# Patient Record
Sex: FEMALE | Race: WHITE | Employment: OTHER | URBAN - METROPOLITAN AREA
[De-identification: names, ages, dates, MRNs, and addresses within clinical notes are randomized per-mention and may not be internally consistent; named-entity substitution may affect disease eponyms.]

---

## 2020-01-01 ENCOUNTER — APPOINTMENT (OUTPATIENT)
Dept: GENERAL RADIOLOGY | Age: 70
DRG: 291 | End: 2020-01-01
Attending: INTERNAL MEDICINE
Payer: MEDICARE

## 2020-01-01 ENCOUNTER — PATIENT OUTREACH (OUTPATIENT)
Dept: CASE MANAGEMENT | Age: 70
End: 2020-01-01

## 2020-01-01 ENCOUNTER — APPOINTMENT (OUTPATIENT)
Dept: CT IMAGING | Age: 70
DRG: 291 | End: 2020-01-01
Attending: INTERNAL MEDICINE
Payer: MEDICARE

## 2020-01-01 ENCOUNTER — APPOINTMENT (OUTPATIENT)
Dept: GENERAL RADIOLOGY | Age: 70
DRG: 190 | End: 2020-01-01
Attending: STUDENT IN AN ORGANIZED HEALTH CARE EDUCATION/TRAINING PROGRAM
Payer: MEDICARE

## 2020-01-01 ENCOUNTER — HOSPITAL ENCOUNTER (INPATIENT)
Age: 70
LOS: 3 days | Discharge: HOME | DRG: 190 | End: 2020-05-07
Attending: STUDENT IN AN ORGANIZED HEALTH CARE EDUCATION/TRAINING PROGRAM | Admitting: INTERNAL MEDICINE
Payer: MEDICARE

## 2020-01-01 ENCOUNTER — APPOINTMENT (OUTPATIENT)
Dept: GENERAL RADIOLOGY | Age: 70
DRG: 291 | End: 2020-01-01
Attending: EMERGENCY MEDICINE
Payer: MEDICARE

## 2020-01-01 ENCOUNTER — APPOINTMENT (OUTPATIENT)
Dept: GENERAL RADIOLOGY | Age: 70
DRG: 189 | End: 2020-01-01
Attending: EMERGENCY MEDICINE
Payer: MEDICARE

## 2020-01-01 ENCOUNTER — HOSPITAL ENCOUNTER (INPATIENT)
Age: 70
LOS: 7 days | DRG: 291 | End: 2020-08-26
Attending: EMERGENCY MEDICINE | Admitting: INTERNAL MEDICINE
Payer: MEDICARE

## 2020-01-01 ENCOUNTER — APPOINTMENT (OUTPATIENT)
Dept: CT IMAGING | Age: 70
DRG: 189 | End: 2020-01-01
Attending: EMERGENCY MEDICINE
Payer: MEDICARE

## 2020-01-01 ENCOUNTER — HOSPITAL ENCOUNTER (OUTPATIENT)
Dept: CT IMAGING | Age: 70
Discharge: HOME | DRG: 291 | End: 2020-08-22
Attending: INTERNAL MEDICINE
Payer: MEDICARE

## 2020-01-01 ENCOUNTER — HOSPITAL ENCOUNTER (OUTPATIENT)
Dept: CT IMAGING | Age: 70
Discharge: HOME | End: 2020-06-19
Attending: NURSE PRACTITIONER

## 2020-01-01 ENCOUNTER — HOSPITAL ENCOUNTER (INPATIENT)
Age: 70
LOS: 2 days | Discharge: HOME | DRG: 189 | End: 2020-06-23
Attending: EMERGENCY MEDICINE | Admitting: INTERNAL MEDICINE
Payer: MEDICARE

## 2020-01-01 VITALS
OXYGEN SATURATION: 95 % | RESPIRATION RATE: 19 BRPM | DIASTOLIC BLOOD PRESSURE: 71 MMHG | BODY MASS INDEX: 47.83 KG/M2 | HEIGHT: 62 IN | WEIGHT: 259.92 LBS | TEMPERATURE: 98.2 F | HEART RATE: 85 BPM | SYSTOLIC BLOOD PRESSURE: 121 MMHG

## 2020-01-01 VITALS
OXYGEN SATURATION: 92 % | HEIGHT: 62 IN | RESPIRATION RATE: 22 BRPM | HEART RATE: 105 BPM | WEIGHT: 273 LBS | DIASTOLIC BLOOD PRESSURE: 81 MMHG | SYSTOLIC BLOOD PRESSURE: 107 MMHG | BODY MASS INDEX: 50.24 KG/M2 | TEMPERATURE: 97.9 F

## 2020-01-01 VITALS
HEIGHT: 62 IN | TEMPERATURE: 98 F | OXYGEN SATURATION: 71 % | DIASTOLIC BLOOD PRESSURE: 92 MMHG | SYSTOLIC BLOOD PRESSURE: 195 MMHG | BODY MASS INDEX: 49.13 KG/M2 | WEIGHT: 266.98 LBS

## 2020-01-01 DIAGNOSIS — I16.0 HYPERTENSIVE URGENCY: ICD-10-CM

## 2020-01-01 DIAGNOSIS — E66.2 OBESITY HYPOVENTILATION SYNDROME (HCC): Chronic | ICD-10-CM

## 2020-01-01 DIAGNOSIS — J96.21 ACUTE ON CHRONIC RESPIRATORY FAILURE WITH HYPOXIA (HCC): Primary | ICD-10-CM

## 2020-01-01 DIAGNOSIS — J47.9 BRONCHIECTASIS WITHOUT COMPLICATION (HCC): ICD-10-CM

## 2020-01-01 DIAGNOSIS — R09.02 HYPOXIA: ICD-10-CM

## 2020-01-01 DIAGNOSIS — R60.1 ANASARCA: ICD-10-CM

## 2020-01-01 DIAGNOSIS — R41.82 ALTERED MENTAL STATUS, UNSPECIFIED ALTERED MENTAL STATUS TYPE: ICD-10-CM

## 2020-01-01 DIAGNOSIS — J96.02 ACUTE RESPIRATORY FAILURE WITH HYPOXIA AND HYPERCARBIA (HCC): ICD-10-CM

## 2020-01-01 DIAGNOSIS — J90 PLEURAL EFFUSION: ICD-10-CM

## 2020-01-01 DIAGNOSIS — R06.00 DYSPNEA, UNSPECIFIED TYPE: ICD-10-CM

## 2020-01-01 DIAGNOSIS — F41.9 ANXIETY: ICD-10-CM

## 2020-01-01 DIAGNOSIS — E87.70 EDEMA DUE TO HYPERVOLEMIA: ICD-10-CM

## 2020-01-01 DIAGNOSIS — J47.9 BRONCHIECTASIS WITHOUT COMPLICATION (HCC): Chronic | ICD-10-CM

## 2020-01-01 DIAGNOSIS — R29.818 SUSPECTED SLEEP APNEA: Chronic | ICD-10-CM

## 2020-01-01 DIAGNOSIS — I50.31 ACUTE DIASTOLIC CHF (CONGESTIVE HEART FAILURE) (HCC): ICD-10-CM

## 2020-01-01 DIAGNOSIS — J47.1 BRONCHIECTASIS WITH ACUTE EXACERBATION (HCC): ICD-10-CM

## 2020-01-01 DIAGNOSIS — J81.0 ACUTE PULMONARY EDEMA (HCC): ICD-10-CM

## 2020-01-01 DIAGNOSIS — J96.21 ACUTE ON CHRONIC RESPIRATORY FAILURE WITH HYPOXIA AND HYPERCAPNIA (HCC): ICD-10-CM

## 2020-01-01 DIAGNOSIS — J96.01 ACUTE HYPOXEMIC RESPIRATORY FAILURE (HCC): ICD-10-CM

## 2020-01-01 DIAGNOSIS — I48.19 PERSISTENT ATRIAL FIBRILLATION (HCC): ICD-10-CM

## 2020-01-01 DIAGNOSIS — I50.9 CONGESTIVE HEART FAILURE, UNSPECIFIED HF CHRONICITY, UNSPECIFIED HEART FAILURE TYPE (HCC): ICD-10-CM

## 2020-01-01 DIAGNOSIS — R29.818 SUSPECTED SLEEP APNEA: ICD-10-CM

## 2020-01-01 DIAGNOSIS — R53.81 DEBILITY: ICD-10-CM

## 2020-01-01 DIAGNOSIS — J96.11 CHRONIC RESPIRATORY FAILURE WITH HYPOXIA (HCC): Chronic | ICD-10-CM

## 2020-01-01 DIAGNOSIS — J96.22 ACUTE ON CHRONIC RESPIRATORY FAILURE WITH HYPOXIA AND HYPERCAPNIA (HCC): ICD-10-CM

## 2020-01-01 DIAGNOSIS — I27.20 MODERATE TO SEVERE PULMONARY HYPERTENSION (HCC): ICD-10-CM

## 2020-01-01 DIAGNOSIS — I10 ESSENTIAL HYPERTENSION: Chronic | ICD-10-CM

## 2020-01-01 DIAGNOSIS — J96.01 ACUTE RESPIRATORY FAILURE WITH HYPOXIA AND HYPERCARBIA (HCC): ICD-10-CM

## 2020-01-01 DIAGNOSIS — I48.19 OTHER PERSISTENT ATRIAL FIBRILLATION (HCC): ICD-10-CM

## 2020-01-01 DIAGNOSIS — I48.91 ATRIAL FIBRILLATION, UNSPECIFIED TYPE (HCC): Chronic | ICD-10-CM

## 2020-01-01 DIAGNOSIS — I48.91 ATRIAL FIBRILLATION, UNSPECIFIED TYPE (HCC): Primary | ICD-10-CM

## 2020-01-01 LAB
ALBUMIN SERPL-MCNC: 2.2 G/DL (ref 3.2–4.6)
ALBUMIN SERPL-MCNC: 2.8 G/DL (ref 3.2–4.6)
ALBUMIN SERPL-MCNC: 2.9 G/DL (ref 3.2–4.6)
ALBUMIN SERPL-MCNC: 3 G/DL (ref 3.2–4.6)
ALBUMIN SERPL-MCNC: 3.2 G/DL (ref 3.2–4.6)
ALBUMIN/GLOB SERPL: 0.6 {RATIO} (ref 1.2–3.5)
ALBUMIN/GLOB SERPL: 0.8 {RATIO} (ref 1.2–3.5)
ALBUMIN/GLOB SERPL: 0.9 {RATIO} (ref 1.2–3.5)
ALP SERPL-CCNC: 63 U/L (ref 50–136)
ALP SERPL-CCNC: 64 U/L (ref 50–136)
ALP SERPL-CCNC: 70 U/L (ref 50–136)
ALP SERPL-CCNC: 72 U/L (ref 50–136)
ALP SERPL-CCNC: 79 U/L (ref 50–136)
ALT SERPL-CCNC: 12 U/L (ref 12–65)
ALT SERPL-CCNC: 13 U/L (ref 12–65)
ALT SERPL-CCNC: 17 U/L (ref 12–65)
ALT SERPL-CCNC: 20 U/L (ref 12–65)
ALT SERPL-CCNC: 27 U/L (ref 12–65)
ANA SER QL: POSITIVE
ANION GAP SERPL CALC-SCNC: 1 MMOL/L (ref 7–16)
ANION GAP SERPL CALC-SCNC: 3 MMOL/L (ref 7–16)
ANION GAP SERPL CALC-SCNC: 4 MMOL/L (ref 7–16)
ANION GAP SERPL CALC-SCNC: 4 MMOL/L (ref 7–16)
ANION GAP SERPL CALC-SCNC: 5 MMOL/L (ref 7–16)
ANION GAP SERPL CALC-SCNC: 6 MMOL/L (ref 7–16)
ANION GAP SERPL CALC-SCNC: 7 MMOL/L (ref 7–16)
ANION GAP SERPL CALC-SCNC: 8 MMOL/L (ref 7–16)
ANION GAP SERPL CALC-SCNC: 8 MMOL/L (ref 7–16)
APPEARANCE UR: CLEAR
APTT PPP: 39.6 SEC (ref 24.3–35.4)
ARTERIAL PATENCY WRIST A: YES
AST SERPL-CCNC: 16 U/L (ref 15–37)
AST SERPL-CCNC: 21 U/L (ref 15–37)
AST SERPL-CCNC: 23 U/L (ref 15–37)
AST SERPL-CCNC: 40 U/L (ref 15–37)
AST SERPL-CCNC: 50 U/L (ref 15–37)
ATRIAL RATE: 144 BPM
ATRIAL RATE: 250 BPM
ATRIAL RATE: 357 BPM
ATRIAL RATE: 84 BPM
BACTERIA SPEC CULT: ABNORMAL
BACTERIA SPEC CULT: ABNORMAL
BACTERIA SPEC CULT: NORMAL
BACTERIA URNS QL MICRO: 0 /HPF
BASE EXCESS BLD CALC-SCNC: 2 MMOL/L
BASE EXCESS BLD CALC-SCNC: 21 MMOL/L
BASE EXCESS BLDV CALC-SCNC: 14 MMOL/L
BASOPHILS # BLD: 0 K/UL (ref 0–0.2)
BASOPHILS NFR BLD: 0 % (ref 0–2)
BDY SITE: ABNORMAL
BILIRUB DIRECT SERPL-MCNC: 0.6 MG/DL
BILIRUB SERPL-MCNC: 1.2 MG/DL (ref 0.2–1.1)
BILIRUB SERPL-MCNC: 1.2 MG/DL (ref 0.2–1.1)
BILIRUB SERPL-MCNC: 1.4 MG/DL (ref 0.2–1.1)
BILIRUB SERPL-MCNC: 1.6 MG/DL (ref 0.2–1.1)
BILIRUB SERPL-MCNC: 2 MG/DL (ref 0.2–1.1)
BILIRUB UR QL: NEGATIVE
BNP SERPL-MCNC: 2307 PG/ML (ref 5–125)
BNP SERPL-MCNC: 5211 PG/ML (ref 5–125)
BUN SERPL-MCNC: 11 MG/DL (ref 8–23)
BUN SERPL-MCNC: 12 MG/DL (ref 8–23)
BUN SERPL-MCNC: 13 MG/DL (ref 8–23)
BUN SERPL-MCNC: 13 MG/DL (ref 8–23)
BUN SERPL-MCNC: 14 MG/DL (ref 8–23)
BUN SERPL-MCNC: 14 MG/DL (ref 8–23)
BUN SERPL-MCNC: 15 MG/DL (ref 8–23)
BUN SERPL-MCNC: 17 MG/DL (ref 8–23)
BUN SERPL-MCNC: 18 MG/DL (ref 8–23)
BUN SERPL-MCNC: 19 MG/DL (ref 8–23)
BUN SERPL-MCNC: 20 MG/DL (ref 8–23)
BUN SERPL-MCNC: 22 MG/DL (ref 8–23)
CALCIUM SERPL-MCNC: 7.9 MG/DL (ref 8.3–10.4)
CALCIUM SERPL-MCNC: 8 MG/DL (ref 8.3–10.4)
CALCIUM SERPL-MCNC: 8.1 MG/DL (ref 8.3–10.4)
CALCIUM SERPL-MCNC: 8.1 MG/DL (ref 8.3–10.4)
CALCIUM SERPL-MCNC: 8.2 MG/DL (ref 8.3–10.4)
CALCIUM SERPL-MCNC: 8.2 MG/DL (ref 8.3–10.4)
CALCIUM SERPL-MCNC: 8.3 MG/DL (ref 8.3–10.4)
CALCIUM SERPL-MCNC: 8.4 MG/DL (ref 8.3–10.4)
CALCIUM SERPL-MCNC: 8.5 MG/DL (ref 8.3–10.4)
CALCIUM SERPL-MCNC: 8.5 MG/DL (ref 8.3–10.4)
CALCIUM SERPL-MCNC: 8.7 MG/DL (ref 8.3–10.4)
CALCULATED R AXIS, ECG10: 23 DEGREES
CALCULATED R AXIS, ECG10: 28 DEGREES
CALCULATED R AXIS, ECG10: 46 DEGREES
CALCULATED R AXIS, ECG10: 50 DEGREES
CALCULATED T AXIS, ECG11: -24 DEGREES
CALCULATED T AXIS, ECG11: 24 DEGREES
CALCULATED T AXIS, ECG11: 46 DEGREES
CALCULATED T AXIS, ECG11: 63 DEGREES
CASTS URNS QL MICRO: ABNORMAL /LPF
CENTROMERE B AB SER-ACNC: <0.2 AI (ref 0–0.9)
CHLORIDE SERPL-SCNC: 100 MMOL/L (ref 98–107)
CHLORIDE SERPL-SCNC: 100 MMOL/L (ref 98–107)
CHLORIDE SERPL-SCNC: 102 MMOL/L (ref 98–107)
CHLORIDE SERPL-SCNC: 102 MMOL/L (ref 98–107)
CHLORIDE SERPL-SCNC: 104 MMOL/L (ref 98–107)
CHLORIDE SERPL-SCNC: 105 MMOL/L (ref 98–107)
CHLORIDE SERPL-SCNC: 108 MMOL/L (ref 98–107)
CHLORIDE SERPL-SCNC: 90 MMOL/L (ref 98–107)
CHLORIDE SERPL-SCNC: 95 MMOL/L (ref 98–107)
CHLORIDE SERPL-SCNC: 97 MMOL/L (ref 98–107)
CHLORIDE SERPL-SCNC: 97 MMOL/L (ref 98–107)
CHLORIDE SERPL-SCNC: 98 MMOL/L (ref 98–107)
CHLORIDE SERPL-SCNC: 99 MMOL/L (ref 98–107)
CHROMATIN AB SERPL-ACNC: <0.2 AI (ref 0–0.9)
CO2 BLD-SCNC: 31 MMOL/L
CO2 BLD-SCNC: 44 MMOL/L
CO2 BLD-SCNC: 46 MMOL/L
CO2 SERPL-SCNC: 31 MMOL/L (ref 21–32)
CO2 SERPL-SCNC: 32 MMOL/L (ref 21–32)
CO2 SERPL-SCNC: 33 MMOL/L (ref 21–32)
CO2 SERPL-SCNC: 34 MMOL/L (ref 21–32)
CO2 SERPL-SCNC: 34 MMOL/L (ref 21–32)
CO2 SERPL-SCNC: 35 MMOL/L (ref 21–32)
CO2 SERPL-SCNC: 35 MMOL/L (ref 21–32)
CO2 SERPL-SCNC: 38 MMOL/L (ref 21–32)
CO2 SERPL-SCNC: 39 MMOL/L (ref 21–32)
CO2 SERPL-SCNC: 40 MMOL/L (ref 21–32)
CO2 SERPL-SCNC: 40 MMOL/L (ref 21–32)
CO2 SERPL-SCNC: 41 MMOL/L (ref 21–32)
CO2 SERPL-SCNC: 42 MMOL/L (ref 21–32)
CO2 SERPL-SCNC: 44 MMOL/L (ref 21–32)
COLLECT TIME,HTIME: 1100
COLLECT TIME,HTIME: 1710
COLLECT TIME,HTIME: 826
COLOR UR: ABNORMAL
CREAT SERPL-MCNC: 0.71 MG/DL (ref 0.6–1)
CREAT SERPL-MCNC: 0.75 MG/DL (ref 0.6–1)
CREAT SERPL-MCNC: 0.78 MG/DL (ref 0.6–1)
CREAT SERPL-MCNC: 0.8 MG/DL (ref 0.6–1)
CREAT SERPL-MCNC: 0.84 MG/DL (ref 0.6–1)
CREAT SERPL-MCNC: 0.84 MG/DL (ref 0.6–1)
CREAT SERPL-MCNC: 0.85 MG/DL (ref 0.6–1)
CREAT SERPL-MCNC: 0.89 MG/DL (ref 0.6–1)
CREAT SERPL-MCNC: 0.89 MG/DL (ref 0.6–1)
CREAT SERPL-MCNC: 0.9 MG/DL (ref 0.6–1)
CREAT SERPL-MCNC: 0.9 MG/DL (ref 0.6–1)
CREAT SERPL-MCNC: 0.91 MG/DL (ref 0.6–1)
CREAT SERPL-MCNC: 0.91 MG/DL (ref 0.6–1)
CREAT SERPL-MCNC: 1.08 MG/DL (ref 0.6–1)
CREAT SERPL-MCNC: 1.12 MG/DL (ref 0.6–1)
CREAT SERPL-MCNC: 1.28 MG/DL (ref 0.6–1)
CRP SERPL-MCNC: 2.8 MG/DL (ref 0–0.9)
D DIMER PPP FEU-MCNC: 1.05 UG/ML(FEU)
D DIMER PPP FEU-MCNC: 1.75 UG/ML(FEU)
DIAGNOSIS, 93000: NORMAL
DIFFERENTIAL METHOD BLD: ABNORMAL
DSDNA AB SER-ACNC: <1 IU/ML (ref 0–9)
EMERGENT DISEASE PANEL, EDPR: NOT DETECTED
ENA JO1 AB SER-ACNC: <0.2 AI (ref 0–0.9)
ENA RNP AB SER-ACNC: <0.2 AI (ref 0–0.9)
ENA SCL70 AB SER-ACNC: <0.2 AI (ref 0–0.9)
ENA SM AB SER-ACNC: <0.2 AI (ref 0–0.9)
ENA SS-A AB SER-ACNC: <0.2 AI (ref 0–0.9)
ENA SS-B AB SER-ACNC: 1.4 AI (ref 0–0.9)
EOSINOPHIL # BLD: 0.1 K/UL (ref 0–0.8)
EOSINOPHIL # BLD: 0.1 K/UL (ref 0–0.8)
EOSINOPHIL # BLD: 0.2 K/UL (ref 0–0.8)
EOSINOPHIL NFR BLD: 1 % (ref 0.5–7.8)
EOSINOPHIL NFR BLD: 1 % (ref 0.5–7.8)
EOSINOPHIL NFR BLD: 2 % (ref 0.5–7.8)
EOSINOPHIL NFR BLD: 2 % (ref 0.5–7.8)
EOSINOPHIL NFR BLD: 3 % (ref 0.5–7.8)
EPI CELLS #/AREA URNS HPF: ABNORMAL /HPF
ERYTHROCYTE [DISTWIDTH] IN BLOOD BY AUTOMATED COUNT: 17.4 % (ref 11.9–14.6)
ERYTHROCYTE [DISTWIDTH] IN BLOOD BY AUTOMATED COUNT: 17.5 % (ref 11.9–14.6)
ERYTHROCYTE [DISTWIDTH] IN BLOOD BY AUTOMATED COUNT: 18 % (ref 11.9–14.6)
ERYTHROCYTE [DISTWIDTH] IN BLOOD BY AUTOMATED COUNT: 18.3 % (ref 11.9–14.6)
ERYTHROCYTE [DISTWIDTH] IN BLOOD BY AUTOMATED COUNT: 18.7 % (ref 11.9–14.6)
ERYTHROCYTE [DISTWIDTH] IN BLOOD BY AUTOMATED COUNT: 19 % (ref 11.9–14.6)
ERYTHROCYTE [DISTWIDTH] IN BLOOD BY AUTOMATED COUNT: 19.1 % (ref 11.9–14.6)
ERYTHROCYTE [DISTWIDTH] IN BLOOD BY AUTOMATED COUNT: 19.2 % (ref 11.9–14.6)
ERYTHROCYTE [DISTWIDTH] IN BLOOD BY AUTOMATED COUNT: 19.2 % (ref 11.9–14.6)
ERYTHROCYTE [DISTWIDTH] IN BLOOD BY AUTOMATED COUNT: 19.4 % (ref 11.9–14.6)
ERYTHROCYTE [DISTWIDTH] IN BLOOD BY AUTOMATED COUNT: 19.4 % (ref 11.9–14.6)
EXHALED MINUTE VOLUME, VE: 15 L/MIN
FERRITIN SERPL-MCNC: 77 NG/ML (ref 8–388)
FLOW RATE ISTAT,IFRATE: 6 L/MIN
GAS FLOW.O2 O2 DELIVERY SYS: ABNORMAL L/MIN
GAS FLOW.O2 SETTING OXYMISER: 12 BPM
GLOBULIN SER CALC-MCNC: 3.4 G/DL (ref 2.3–3.5)
GLOBULIN SER CALC-MCNC: 3.5 G/DL (ref 2.3–3.5)
GLOBULIN SER CALC-MCNC: 3.7 G/DL (ref 2.3–3.5)
GLUCOSE BLD STRIP.AUTO-MCNC: 110 MG/DL (ref 65–100)
GLUCOSE SERPL-MCNC: 100 MG/DL (ref 65–100)
GLUCOSE SERPL-MCNC: 104 MG/DL (ref 65–100)
GLUCOSE SERPL-MCNC: 108 MG/DL (ref 65–100)
GLUCOSE SERPL-MCNC: 115 MG/DL (ref 65–100)
GLUCOSE SERPL-MCNC: 123 MG/DL (ref 65–100)
GLUCOSE SERPL-MCNC: 135 MG/DL (ref 65–100)
GLUCOSE SERPL-MCNC: 78 MG/DL (ref 65–100)
GLUCOSE SERPL-MCNC: 81 MG/DL (ref 65–100)
GLUCOSE SERPL-MCNC: 90 MG/DL (ref 65–100)
GLUCOSE SERPL-MCNC: 93 MG/DL (ref 65–100)
GLUCOSE SERPL-MCNC: 93 MG/DL (ref 65–100)
GLUCOSE SERPL-MCNC: 94 MG/DL (ref 65–100)
GLUCOSE SERPL-MCNC: 98 MG/DL (ref 65–100)
GLUCOSE UR STRIP.AUTO-MCNC: NEGATIVE MG/DL
GRAM STN SPEC: ABNORMAL
GRAM STN SPEC: NORMAL
HCO3 BLD-SCNC: 29.2 MMOL/L (ref 22–26)
HCO3 BLD-SCNC: 44.9 MMOL/L (ref 22–26)
HCO3 BLDV-SCNC: 42 MMOL/L (ref 23–28)
HCT VFR BLD AUTO: 26.2 % (ref 35.8–46.3)
HCT VFR BLD AUTO: 26.4 % (ref 35.8–46.3)
HCT VFR BLD AUTO: 28.2 % (ref 35.8–46.3)
HCT VFR BLD AUTO: 29.4 % (ref 35.8–46.3)
HCT VFR BLD AUTO: 29.8 % (ref 35.8–46.3)
HCT VFR BLD AUTO: 31.7 % (ref 35.8–46.3)
HCT VFR BLD AUTO: 33.5 % (ref 35.8–46.3)
HCT VFR BLD AUTO: 35.7 % (ref 35.8–46.3)
HCT VFR BLD AUTO: 37.2 % (ref 35.8–46.3)
HCT VFR BLD AUTO: 37.5 % (ref 35.8–46.3)
HCT VFR BLD AUTO: 37.8 % (ref 35.8–46.3)
HCT VFR BLD AUTO: 37.8 % (ref 35.8–46.3)
HCT VFR BLD AUTO: 39.2 % (ref 35.8–46.3)
HCV AB S/CO SERPL IA: 0.1 S/CO RATIO (ref 0–0.9)
HCV AB SERPL QL IA: NORMAL
HGB BLD-MCNC: 10.6 G/DL (ref 11.7–15.4)
HGB BLD-MCNC: 10.9 G/DL (ref 11.7–15.4)
HGB BLD-MCNC: 10.9 G/DL (ref 11.7–15.4)
HGB BLD-MCNC: 11.2 G/DL (ref 11.7–15.4)
HGB BLD-MCNC: 11.5 G/DL (ref 11.7–15.4)
HGB BLD-MCNC: 11.8 G/DL (ref 11.7–15.4)
HGB BLD-MCNC: 7.2 G/DL (ref 11.7–15.4)
HGB BLD-MCNC: 7.5 G/DL (ref 11.7–15.4)
HGB BLD-MCNC: 8.3 G/DL (ref 11.7–15.4)
HGB BLD-MCNC: 8.3 G/DL (ref 11.7–15.4)
HGB BLD-MCNC: 8.5 G/DL (ref 11.7–15.4)
HGB BLD-MCNC: 8.6 G/DL (ref 11.7–15.4)
HGB BLD-MCNC: 9.2 G/DL (ref 11.7–15.4)
HGB BLD-MCNC: 9.4 G/DL (ref 11.7–15.4)
HGB UR QL STRIP: NEGATIVE
IMM GRANULOCYTES # BLD AUTO: 0 K/UL (ref 0–0.5)
IMM GRANULOCYTES NFR BLD AUTO: 0 % (ref 0–5)
IMM GRANULOCYTES NFR BLD AUTO: 1 % (ref 0–5)
IMM GRANULOCYTES NFR BLD AUTO: 1 % (ref 0–5)
INR PPP: 1.4
INSPIRATION.DURATION SETTING TIME VENT: 0.9 SEC
KETONES UR QL STRIP.AUTO: NEGATIVE MG/DL
LACTATE SERPL-SCNC: 0.7 MMOL/L (ref 0.4–2)
LACTATE SERPL-SCNC: 1.5 MMOL/L (ref 0.4–2)
LACTATE SERPL-SCNC: 2.3 MMOL/L (ref 0.4–2)
LDH SERPL L TO P-CCNC: 195 U/L (ref 110–210)
LEUKOCYTE ESTERASE UR QL STRIP.AUTO: NEGATIVE
LIPASE SERPL-CCNC: 163 U/L (ref 73–393)
LYMPHOCYTES # BLD: 0.4 K/UL (ref 0.5–4.6)
LYMPHOCYTES # BLD: 0.4 K/UL (ref 0.5–4.6)
LYMPHOCYTES # BLD: 0.5 K/UL (ref 0.5–4.6)
LYMPHOCYTES # BLD: 0.6 K/UL (ref 0.5–4.6)
LYMPHOCYTES # BLD: 0.6 K/UL (ref 0.5–4.6)
LYMPHOCYTES # BLD: 0.7 K/UL (ref 0.5–4.6)
LYMPHOCYTES # BLD: 0.9 K/UL (ref 0.5–4.6)
LYMPHOCYTES # BLD: 0.9 K/UL (ref 0.5–4.6)
LYMPHOCYTES # BLD: 1.4 K/UL (ref 0.5–4.6)
LYMPHOCYTES NFR BLD: 10 % (ref 13–44)
LYMPHOCYTES NFR BLD: 11 % (ref 13–44)
LYMPHOCYTES NFR BLD: 12 % (ref 13–44)
LYMPHOCYTES NFR BLD: 16 % (ref 13–44)
LYMPHOCYTES NFR BLD: 5 % (ref 13–44)
LYMPHOCYTES NFR BLD: 6 % (ref 13–44)
LYMPHOCYTES NFR BLD: 7 % (ref 13–44)
LYMPHOCYTES NFR BLD: 8 % (ref 13–44)
LYMPHOCYTES NFR BLD: 9 % (ref 13–44)
MAGNESIUM SERPL-MCNC: 2.1 MG/DL (ref 1.8–2.4)
MAGNESIUM SERPL-MCNC: 2.1 MG/DL (ref 1.8–2.4)
MAGNESIUM SERPL-MCNC: 2.2 MG/DL (ref 1.8–2.4)
MAGNESIUM SERPL-MCNC: 2.3 MG/DL (ref 1.8–2.4)
MAGNESIUM SERPL-MCNC: 2.3 MG/DL (ref 1.8–2.4)
MAGNESIUM SERPL-MCNC: 2.4 MG/DL (ref 1.8–2.4)
MCH RBC QN AUTO: 23.5 PG (ref 26.1–32.9)
MCH RBC QN AUTO: 23.6 PG (ref 26.1–32.9)
MCH RBC QN AUTO: 23.7 PG (ref 26.1–32.9)
MCH RBC QN AUTO: 23.9 PG (ref 26.1–32.9)
MCH RBC QN AUTO: 24 PG (ref 26.1–32.9)
MCH RBC QN AUTO: 24.1 PG (ref 26.1–32.9)
MCH RBC QN AUTO: 24.3 PG (ref 26.1–32.9)
MCH RBC QN AUTO: 24.3 PG (ref 26.1–32.9)
MCH RBC QN AUTO: 24.4 PG (ref 26.1–32.9)
MCH RBC QN AUTO: 24.5 PG (ref 26.1–32.9)
MCH RBC QN AUTO: 24.8 PG (ref 26.1–32.9)
MCH RBC QN AUTO: 25.8 PG (ref 26.1–32.9)
MCH RBC QN AUTO: 26 PG (ref 26.1–32.9)
MCHC RBC AUTO-ENTMCNC: 27.3 G/DL (ref 31.4–35)
MCHC RBC AUTO-ENTMCNC: 28.1 G/DL (ref 31.4–35)
MCHC RBC AUTO-ENTMCNC: 28.2 G/DL (ref 31.4–35)
MCHC RBC AUTO-ENTMCNC: 28.6 G/DL (ref 31.4–35)
MCHC RBC AUTO-ENTMCNC: 28.8 G/DL (ref 31.4–35)
MCHC RBC AUTO-ENTMCNC: 28.9 G/DL (ref 31.4–35)
MCHC RBC AUTO-ENTMCNC: 29 G/DL (ref 31.4–35)
MCHC RBC AUTO-ENTMCNC: 29.3 G/DL (ref 31.4–35)
MCHC RBC AUTO-ENTMCNC: 29.4 G/DL (ref 31.4–35)
MCHC RBC AUTO-ENTMCNC: 29.6 G/DL (ref 31.4–35)
MCHC RBC AUTO-ENTMCNC: 29.7 G/DL (ref 31.4–35)
MCHC RBC AUTO-ENTMCNC: 30.1 G/DL (ref 31.4–35)
MCHC RBC AUTO-ENTMCNC: 30.7 G/DL (ref 31.4–35)
MCV RBC AUTO: 82 FL (ref 79.6–97.8)
MCV RBC AUTO: 82.4 FL (ref 79.6–97.8)
MCV RBC AUTO: 82.5 FL (ref 79.6–97.8)
MCV RBC AUTO: 83.2 FL (ref 79.6–97.8)
MCV RBC AUTO: 83.2 FL (ref 79.6–97.8)
MCV RBC AUTO: 83.4 FL (ref 79.6–97.8)
MCV RBC AUTO: 83.6 FL (ref 79.6–97.8)
MCV RBC AUTO: 84 FL (ref 79.6–97.8)
MCV RBC AUTO: 84.3 FL (ref 79.6–97.8)
MCV RBC AUTO: 85.1 FL (ref 79.6–97.8)
MCV RBC AUTO: 85.2 FL (ref 79.6–97.8)
MCV RBC AUTO: 86 FL (ref 79.6–97.8)
MCV RBC AUTO: 86.3 FL (ref 79.6–97.8)
MONOCYTES # BLD: 0.6 K/UL (ref 0.1–1.3)
MONOCYTES # BLD: 0.7 K/UL (ref 0.1–1.3)
MONOCYTES # BLD: 0.8 K/UL (ref 0.1–1.3)
MONOCYTES # BLD: 0.8 K/UL (ref 0.1–1.3)
MONOCYTES NFR BLD: 10 % (ref 4–12)
MONOCYTES NFR BLD: 11 % (ref 4–12)
MONOCYTES NFR BLD: 7 % (ref 4–12)
MONOCYTES NFR BLD: 7 % (ref 4–12)
MONOCYTES NFR BLD: 8 % (ref 4–12)
MONOCYTES NFR BLD: 8 % (ref 4–12)
MONOCYTES NFR BLD: 9 % (ref 4–12)
NEUTS SEG # BLD: 5 K/UL (ref 1.7–8.2)
NEUTS SEG # BLD: 5.1 K/UL (ref 1.7–8.2)
NEUTS SEG # BLD: 5.6 K/UL (ref 1.7–8.2)
NEUTS SEG # BLD: 5.8 K/UL (ref 1.7–8.2)
NEUTS SEG # BLD: 6.3 K/UL (ref 1.7–8.2)
NEUTS SEG # BLD: 6.4 K/UL (ref 1.7–8.2)
NEUTS SEG # BLD: 6.7 K/UL (ref 1.7–8.2)
NEUTS SEG # BLD: 6.8 K/UL (ref 1.7–8.2)
NEUTS SEG # BLD: 7 K/UL (ref 1.7–8.2)
NEUTS SEG NFR BLD: 74 % (ref 43–78)
NEUTS SEG NFR BLD: 75 % (ref 43–78)
NEUTS SEG NFR BLD: 76 % (ref 43–78)
NEUTS SEG NFR BLD: 76 % (ref 43–78)
NEUTS SEG NFR BLD: 79 % (ref 43–78)
NEUTS SEG NFR BLD: 81 % (ref 43–78)
NEUTS SEG NFR BLD: 81 % (ref 43–78)
NEUTS SEG NFR BLD: 82 % (ref 43–78)
NEUTS SEG NFR BLD: 84 % (ref 43–78)
NITRITE UR QL STRIP.AUTO: NEGATIVE
NRBC # BLD: 0 K/UL (ref 0–0.2)
O2/TOTAL GAS SETTING VFR VENT: 60 %
O2/TOTAL GAS SETTING VFR VENT: 85 %
PCO2 BLD: 45.3 MMHG (ref 35–45)
PCO2 BLD: 54.2 MMHG (ref 35–45)
PCO2 BLDV: 74.9 MMHG (ref 41–51)
PEEP RESPIRATORY: 10 CMH2O
PEEP RESPIRATORY: 14 CMH2O
PH BLD: 7.34 [PH] (ref 7.35–7.45)
PH BLD: 7.61 [PH] (ref 7.35–7.45)
PH BLDV: 7.36 [PH] (ref 7.32–7.42)
PH UR STRIP: 5.5 [PH] (ref 5–9)
PHOSPHATE SERPL-MCNC: 3.8 MG/DL (ref 2.3–3.7)
PIP ISTAT,IPIP: 26
PLATELET # BLD AUTO: 176 K/UL (ref 150–450)
PLATELET # BLD AUTO: 183 K/UL (ref 150–450)
PLATELET # BLD AUTO: 184 K/UL (ref 150–450)
PLATELET # BLD AUTO: 186 K/UL (ref 150–450)
PLATELET # BLD AUTO: 188 K/UL (ref 150–450)
PLATELET # BLD AUTO: 191 K/UL (ref 150–450)
PLATELET # BLD AUTO: 192 K/UL (ref 150–450)
PLATELET # BLD AUTO: 200 K/UL (ref 150–450)
PLATELET # BLD AUTO: 201 K/UL (ref 150–450)
PLATELET # BLD AUTO: 207 K/UL (ref 150–450)
PLATELET # BLD AUTO: 218 K/UL (ref 150–450)
PLATELET # BLD AUTO: 228 K/UL (ref 150–450)
PLATELET # BLD AUTO: 232 K/UL (ref 150–450)
PMV BLD AUTO: 10.1 FL (ref 9.4–12.3)
PMV BLD AUTO: 10.1 FL (ref 9.4–12.3)
PMV BLD AUTO: 10.3 FL (ref 9.4–12.3)
PMV BLD AUTO: 10.4 FL (ref 9.4–12.3)
PMV BLD AUTO: 10.4 FL (ref 9.4–12.3)
PMV BLD AUTO: 10.5 FL (ref 9.4–12.3)
PMV BLD AUTO: 10.5 FL (ref 9.4–12.3)
PMV BLD AUTO: 10.7 FL (ref 9.4–12.3)
PMV BLD AUTO: 10.8 FL (ref 9.4–12.3)
PMV BLD AUTO: 10.9 FL (ref 9.4–12.3)
PMV BLD AUTO: 11 FL (ref 9.4–12.3)
PMV BLD AUTO: 9.8 FL (ref 9.4–12.3)
PMV BLD AUTO: 9.9 FL (ref 9.4–12.3)
PO2 BLD: 70 MMHG (ref 75–100)
PO2 BLD: 74 MMHG (ref 75–100)
PO2 BLDV: 21 MMHG
POTASSIUM SERPL-SCNC: 2.9 MMOL/L (ref 3.5–5.1)
POTASSIUM SERPL-SCNC: 3.4 MMOL/L (ref 3.5–5.1)
POTASSIUM SERPL-SCNC: 3.7 MMOL/L (ref 3.5–5.1)
POTASSIUM SERPL-SCNC: 3.7 MMOL/L (ref 3.5–5.1)
POTASSIUM SERPL-SCNC: 3.8 MMOL/L (ref 3.5–5.1)
POTASSIUM SERPL-SCNC: 3.8 MMOL/L (ref 3.5–5.1)
POTASSIUM SERPL-SCNC: 3.9 MMOL/L (ref 3.5–5.1)
POTASSIUM SERPL-SCNC: 3.9 MMOL/L (ref 3.5–5.1)
POTASSIUM SERPL-SCNC: 4.1 MMOL/L (ref 3.5–5.1)
POTASSIUM SERPL-SCNC: 4.1 MMOL/L (ref 3.5–5.1)
POTASSIUM SERPL-SCNC: 4.2 MMOL/L (ref 3.5–5.1)
POTASSIUM SERPL-SCNC: 4.3 MMOL/L (ref 3.5–5.1)
POTASSIUM SERPL-SCNC: 4.3 MMOL/L (ref 3.5–5.1)
PROCALCITONIN SERPL-MCNC: 0.05 NG/ML
PROCALCITONIN SERPL-MCNC: 0.06 NG/ML
PROCALCITONIN SERPL-MCNC: 0.06 NG/ML
PROT SERPL-MCNC: 5.6 G/DL (ref 6.3–8.2)
PROT SERPL-MCNC: 6.3 G/DL (ref 6.3–8.2)
PROT SERPL-MCNC: 6.4 G/DL (ref 6.3–8.2)
PROT SERPL-MCNC: 6.5 G/DL (ref 6.3–8.2)
PROT SERPL-MCNC: 6.7 G/DL (ref 6.3–8.2)
PROT UR STRIP-MCNC: 30 MG/DL
PROTHROMBIN TIME: 17.9 SEC (ref 12–14.7)
Q-T INTERVAL, ECG07: 286 MS
Q-T INTERVAL, ECG07: 326 MS
Q-T INTERVAL, ECG07: 334 MS
Q-T INTERVAL, ECG07: 342 MS
QRS DURATION, ECG06: 72 MS
QRS DURATION, ECG06: 74 MS
QRS DURATION, ECG06: 76 MS
QRS DURATION, ECG06: 78 MS
QTC CALCULATION (BEZET), ECG08: 402 MS
QTC CALCULATION (BEZET), ECG08: 406 MS
QTC CALCULATION (BEZET), ECG08: 425 MS
QTC CALCULATION (BEZET), ECG08: 449 MS
RBC # BLD AUTO: 3.07 M/UL (ref 4.05–5.2)
RBC # BLD AUTO: 3.18 M/UL (ref 4.05–5.2)
RBC # BLD AUTO: 3.42 M/UL (ref 4.05–5.2)
RBC # BLD AUTO: 3.5 M/UL (ref 4.05–5.2)
RBC # BLD AUTO: 3.58 M/UL (ref 4.05–5.2)
RBC # BLD AUTO: 3.81 M/UL (ref 4.05–5.2)
RBC # BLD AUTO: 3.93 M/UL (ref 4.05–5.2)
RBC # BLD AUTO: 4.27 M/UL (ref 4.05–5.2)
RBC # BLD AUTO: 4.44 M/UL (ref 4.05–5.2)
RBC # BLD AUTO: 4.45 M/UL (ref 4.05–5.2)
RBC # BLD AUTO: 4.46 M/UL (ref 4.05–5.2)
RBC # BLD AUTO: 4.54 M/UL (ref 4.05–5.2)
RBC # BLD AUTO: 4.61 M/UL (ref 4.05–5.2)
RBC #/AREA URNS HPF: ABNORMAL /HPF
SAO2 % BLD: 93 % (ref 95–98)
SAO2 % BLD: 96 % (ref 95–98)
SAO2 % BLDV: 29 % (ref 65–88)
SEE BELOW, 164869: ABNORMAL
SERVICE CMNT-IMP: ABNORMAL
SERVICE CMNT-IMP: NORMAL
SODIUM SERPL-SCNC: 139 MMOL/L (ref 136–145)
SODIUM SERPL-SCNC: 140 MMOL/L (ref 136–145)
SODIUM SERPL-SCNC: 141 MMOL/L (ref 136–145)
SODIUM SERPL-SCNC: 141 MMOL/L (ref 136–145)
SODIUM SERPL-SCNC: 142 MMOL/L (ref 136–145)
SODIUM SERPL-SCNC: 143 MMOL/L (ref 136–145)
SODIUM SERPL-SCNC: 144 MMOL/L (ref 136–145)
SODIUM SERPL-SCNC: 146 MMOL/L (ref 136–145)
SP GR UR REFRACTOMETRY: 1.04 (ref 1–1.02)
SPECIMEN TYPE: ABNORMAL
TRIGL SERPL-MCNC: 62 MG/DL (ref 35–150)
TROPONIN I SERPL-MCNC: <0.02 NG/ML (ref 0.02–0.05)
TROPONIN I SERPL-MCNC: <0.02 NG/ML (ref 0.02–0.05)
TROPONIN-HIGH SENSITIVITY: 6.7 PG/ML (ref 0–14)
UROBILINOGEN UR QL STRIP.AUTO: 1 EU/DL (ref 0.2–1)
VENTRICULAR RATE, ECG03: 114 BPM
VENTRICULAR RATE, ECG03: 119 BPM
VENTRICULAR RATE, ECG03: 89 BPM
VENTRICULAR RATE, ECG03: 93 BPM
VT SETTING VENT: 480 ML
WBC # BLD AUTO: 10.4 K/UL (ref 4.3–11.1)
WBC # BLD AUTO: 6.6 K/UL (ref 4.3–11.1)
WBC # BLD AUTO: 6.7 K/UL (ref 4.3–11.1)
WBC # BLD AUTO: 7.2 K/UL (ref 4.3–11.1)
WBC # BLD AUTO: 7.5 K/UL (ref 4.3–11.1)
WBC # BLD AUTO: 7.8 K/UL (ref 4.3–11.1)
WBC # BLD AUTO: 7.8 K/UL (ref 4.3–11.1)
WBC # BLD AUTO: 8.1 K/UL (ref 4.3–11.1)
WBC # BLD AUTO: 8.3 K/UL (ref 4.3–11.1)
WBC # BLD AUTO: 8.5 K/UL (ref 4.3–11.1)
WBC # BLD AUTO: 8.7 K/UL (ref 4.3–11.1)
WBC # BLD AUTO: 9.1 K/UL (ref 4.3–11.1)
WBC # BLD AUTO: 9.4 K/UL (ref 4.3–11.1)
WBC URNS QL MICRO: ABNORMAL /HPF

## 2020-01-01 PROCEDURE — 84145 PROCALCITONIN (PCT): CPT

## 2020-01-01 PROCEDURE — 74011250637 HC RX REV CODE- 250/637: Performed by: INTERNAL MEDICINE

## 2020-01-01 PROCEDURE — 74011250636 HC RX REV CODE- 250/636: Performed by: INTERNAL MEDICINE

## 2020-01-01 PROCEDURE — 65270000029 HC RM PRIVATE

## 2020-01-01 PROCEDURE — 36600 WITHDRAWAL OF ARTERIAL BLOOD: CPT

## 2020-01-01 PROCEDURE — 83735 ASSAY OF MAGNESIUM: CPT

## 2020-01-01 PROCEDURE — 51798 US URINE CAPACITY MEASURE: CPT

## 2020-01-01 PROCEDURE — 74011000250 HC RX REV CODE- 250: Performed by: NURSE PRACTITIONER

## 2020-01-01 PROCEDURE — 93005 ELECTROCARDIOGRAM TRACING: CPT | Performed by: INTERNAL MEDICINE

## 2020-01-01 PROCEDURE — 94640 AIRWAY INHALATION TREATMENT: CPT

## 2020-01-01 PROCEDURE — 83615 LACTATE (LD) (LDH) ENZYME: CPT

## 2020-01-01 PROCEDURE — 77030021668 HC NEB PREFIL KT VYRM -A

## 2020-01-01 PROCEDURE — C1751 CATH, INF, PER/CENT/MIDLINE: HCPCS

## 2020-01-01 PROCEDURE — 74011250637 HC RX REV CODE- 250/637: Performed by: HOSPITALIST

## 2020-01-01 PROCEDURE — 74011250637 HC RX REV CODE- 250/637: Performed by: FAMILY MEDICINE

## 2020-01-01 PROCEDURE — 87641 MR-STAPH DNA AMP PROBE: CPT

## 2020-01-01 PROCEDURE — P9047 ALBUMIN (HUMAN), 25%, 50ML: HCPCS | Performed by: FAMILY MEDICINE

## 2020-01-01 PROCEDURE — 74011000636 HC RX REV CODE- 636: Performed by: INTERNAL MEDICINE

## 2020-01-01 PROCEDURE — 74011000250 HC RX REV CODE- 250: Performed by: INTERNAL MEDICINE

## 2020-01-01 PROCEDURE — 02HV33Z INSERTION OF INFUSION DEVICE INTO SUPERIOR VENA CAVA, PERCUTANEOUS APPROACH: ICD-10-PCS | Performed by: INTERNAL MEDICINE

## 2020-01-01 PROCEDURE — 85027 COMPLETE CBC AUTOMATED: CPT

## 2020-01-01 PROCEDURE — 74011250636 HC RX REV CODE- 250/636: Performed by: STUDENT IN AN ORGANIZED HEALTH CARE EDUCATION/TRAINING PROGRAM

## 2020-01-01 PROCEDURE — 74011636320 HC RX REV CODE- 636/320: Performed by: INTERNAL MEDICINE

## 2020-01-01 PROCEDURE — 86038 ANTINUCLEAR ANTIBODIES: CPT

## 2020-01-01 PROCEDURE — 94762 N-INVAS EAR/PLS OXIMTRY CONT: CPT

## 2020-01-01 PROCEDURE — 97530 THERAPEUTIC ACTIVITIES: CPT

## 2020-01-01 PROCEDURE — 94760 N-INVAS EAR/PLS OXIMETRY 1: CPT

## 2020-01-01 PROCEDURE — 74011000258 HC RX REV CODE- 258: Performed by: INTERNAL MEDICINE

## 2020-01-01 PROCEDURE — 77010033711 HC HIGH FLOW OXYGEN

## 2020-01-01 PROCEDURE — 36415 COLL VENOUS BLD VENIPUNCTURE: CPT

## 2020-01-01 PROCEDURE — 74011250637 HC RX REV CODE- 250/637: Performed by: NURSE PRACTITIONER

## 2020-01-01 PROCEDURE — 86140 C-REACTIVE PROTEIN: CPT

## 2020-01-01 PROCEDURE — 65610000006 HC RM INTENSIVE CARE

## 2020-01-01 PROCEDURE — 93005 ELECTROCARDIOGRAM TRACING: CPT | Performed by: EMERGENCY MEDICINE

## 2020-01-01 PROCEDURE — 97166 OT EVAL MOD COMPLEX 45 MIN: CPT

## 2020-01-01 PROCEDURE — 80048 BASIC METABOLIC PNL TOTAL CA: CPT

## 2020-01-01 PROCEDURE — 77010033678 HC OXYGEN DAILY

## 2020-01-01 PROCEDURE — 71045 X-RAY EXAM CHEST 1 VIEW: CPT

## 2020-01-01 PROCEDURE — 86225 DNA ANTIBODY NATIVE: CPT

## 2020-01-01 PROCEDURE — 99232 SBSQ HOSP IP/OBS MODERATE 35: CPT | Performed by: INTERNAL MEDICINE

## 2020-01-01 PROCEDURE — 87205 SMEAR GRAM STAIN: CPT

## 2020-01-01 PROCEDURE — 65660000000 HC RM CCU STEPDOWN

## 2020-01-01 PROCEDURE — 99222 1ST HOSP IP/OBS MODERATE 55: CPT | Performed by: INTERNAL MEDICINE

## 2020-01-01 PROCEDURE — 74011250636 HC RX REV CODE- 250/636: Performed by: EMERGENCY MEDICINE

## 2020-01-01 PROCEDURE — 96365 THER/PROPH/DIAG IV INF INIT: CPT

## 2020-01-01 PROCEDURE — 83880 ASSAY OF NATRIURETIC PEPTIDE: CPT

## 2020-01-01 PROCEDURE — 96376 TX/PRO/DX INJ SAME DRUG ADON: CPT

## 2020-01-01 PROCEDURE — 80053 COMPREHEN METABOLIC PANEL: CPT

## 2020-01-01 PROCEDURE — 97161 PT EVAL LOW COMPLEX 20 MIN: CPT

## 2020-01-01 PROCEDURE — C8929 TTE W OR WO FOL WCON,DOPPLER: HCPCS

## 2020-01-01 PROCEDURE — 83605 ASSAY OF LACTIC ACID: CPT

## 2020-01-01 PROCEDURE — 84484 ASSAY OF TROPONIN QUANT: CPT

## 2020-01-01 PROCEDURE — 94761 N-INVAS EAR/PLS OXIMETRY MLT: CPT

## 2020-01-01 PROCEDURE — 74011250636 HC RX REV CODE- 250/636: Performed by: FAMILY MEDICINE

## 2020-01-01 PROCEDURE — 96374 THER/PROPH/DIAG INJ IV PUSH: CPT

## 2020-01-01 PROCEDURE — 71260 CT THORAX DX C+: CPT

## 2020-01-01 PROCEDURE — 99285 EMERGENCY DEPT VISIT HI MDM: CPT

## 2020-01-01 PROCEDURE — 36592 COLLECT BLOOD FROM PICC: CPT

## 2020-01-01 PROCEDURE — 99223 1ST HOSP IP/OBS HIGH 75: CPT | Performed by: INTERNAL MEDICINE

## 2020-01-01 PROCEDURE — 81001 URINALYSIS AUTO W/SCOPE: CPT

## 2020-01-01 PROCEDURE — 96375 TX/PRO/DX INJ NEW DRUG ADDON: CPT

## 2020-01-01 PROCEDURE — 85025 COMPLETE CBC W/AUTO DIFF WBC: CPT

## 2020-01-01 PROCEDURE — 94660 CPAP INITIATION&MGMT: CPT

## 2020-01-01 PROCEDURE — 36573 INSJ PICC RS&I 5 YR+: CPT | Performed by: INTERNAL MEDICINE

## 2020-01-01 PROCEDURE — 99218 HC RM OBSERVATION: CPT

## 2020-01-01 PROCEDURE — 70450 CT HEAD/BRAIN W/O DYE: CPT

## 2020-01-01 PROCEDURE — 74011250636 HC RX REV CODE- 250/636: Performed by: NURSE PRACTITIONER

## 2020-01-01 PROCEDURE — 77030018846 HC SOL IRR STRL H20 ICUM -A

## 2020-01-01 PROCEDURE — 82962 GLUCOSE BLOOD TEST: CPT

## 2020-01-01 PROCEDURE — 93005 ELECTROCARDIOGRAM TRACING: CPT | Performed by: STUDENT IN AN ORGANIZED HEALTH CARE EDUCATION/TRAINING PROGRAM

## 2020-01-01 PROCEDURE — 77030040830 HC CATH URETH FOL MDII -A

## 2020-01-01 PROCEDURE — 82803 BLOOD GASES ANY COMBINATION: CPT

## 2020-01-01 PROCEDURE — 97116 GAIT TRAINING THERAPY: CPT

## 2020-01-01 PROCEDURE — 84478 ASSAY OF TRIGLYCERIDES: CPT

## 2020-01-01 PROCEDURE — 5A09357 ASSISTANCE WITH RESPIRATORY VENTILATION, LESS THAN 24 CONSECUTIVE HOURS, CONTINUOUS POSITIVE AIRWAY PRESSURE: ICD-10-PCS | Performed by: INTERNAL MEDICINE

## 2020-01-01 PROCEDURE — 85018 HEMOGLOBIN: CPT

## 2020-01-01 PROCEDURE — 74011000258 HC RX REV CODE- 258: Performed by: NURSE PRACTITIONER

## 2020-01-01 PROCEDURE — 99291 CRITICAL CARE FIRST HOUR: CPT | Performed by: INTERNAL MEDICINE

## 2020-01-01 PROCEDURE — 97162 PT EVAL MOD COMPLEX 30 MIN: CPT

## 2020-01-01 PROCEDURE — 87077 CULTURE AEROBIC IDENTIFY: CPT

## 2020-01-01 PROCEDURE — 99231 SBSQ HOSP IP/OBS SF/LOW 25: CPT | Performed by: INTERNAL MEDICINE

## 2020-01-01 PROCEDURE — 80076 HEPATIC FUNCTION PANEL: CPT

## 2020-01-01 PROCEDURE — 74011000250 HC RX REV CODE- 250: Performed by: FAMILY MEDICINE

## 2020-01-01 PROCEDURE — 83690 ASSAY OF LIPASE: CPT

## 2020-01-01 PROCEDURE — 97535 SELF CARE MNGMENT TRAINING: CPT

## 2020-01-01 PROCEDURE — 82728 ASSAY OF FERRITIN: CPT

## 2020-01-01 PROCEDURE — 84100 ASSAY OF PHOSPHORUS: CPT

## 2020-01-01 PROCEDURE — 74011000250 HC RX REV CODE- 250: Performed by: EMERGENCY MEDICINE

## 2020-01-01 PROCEDURE — 99233 SBSQ HOSP IP/OBS HIGH 50: CPT | Performed by: INTERNAL MEDICINE

## 2020-01-01 PROCEDURE — 87040 BLOOD CULTURE FOR BACTERIA: CPT

## 2020-01-01 PROCEDURE — 97165 OT EVAL LOW COMPLEX 30 MIN: CPT

## 2020-01-01 PROCEDURE — 77030038269 HC DRN EXT URIN PURWCK BARD -A

## 2020-01-01 PROCEDURE — 74177 CT ABD & PELVIS W/CONTRAST: CPT

## 2020-01-01 PROCEDURE — 85379 FIBRIN DEGRADATION QUANT: CPT

## 2020-01-01 PROCEDURE — 85610 PROTHROMBIN TIME: CPT

## 2020-01-01 PROCEDURE — 86803 HEPATITIS C AB TEST: CPT

## 2020-01-01 PROCEDURE — 85730 THROMBOPLASTIN TIME PARTIAL: CPT

## 2020-01-01 PROCEDURE — 99223 1ST HOSP IP/OBS HIGH 75: CPT | Performed by: NURSE PRACTITIONER

## 2020-01-01 PROCEDURE — 76450000000

## 2020-01-01 PROCEDURE — 71046 X-RAY EXAM CHEST 2 VIEWS: CPT

## 2020-01-01 PROCEDURE — 97110 THERAPEUTIC EXERCISES: CPT

## 2020-01-01 RX ORDER — LEVOFLOXACIN 750 MG/1
750 TABLET ORAL EVERY 24 HOURS
Qty: 4 TAB | Refills: 0 | Status: SHIPPED | OUTPATIENT
Start: 2020-01-01 | End: 2020-01-01

## 2020-01-01 RX ORDER — LORAZEPAM 2 MG/ML
1 INJECTION INTRAMUSCULAR
Status: DISCONTINUED | OUTPATIENT
Start: 2020-01-01 | End: 2020-01-01

## 2020-01-01 RX ORDER — IPRATROPIUM BROMIDE AND ALBUTEROL SULFATE 2.5; .5 MG/3ML; MG/3ML
3 SOLUTION RESPIRATORY (INHALATION)
Status: DISCONTINUED | OUTPATIENT
Start: 2020-01-01 | End: 2020-01-01 | Stop reason: HOSPADM

## 2020-01-01 RX ORDER — METOLAZONE 5 MG/1
5 TABLET ORAL DAILY
Status: DISCONTINUED | OUTPATIENT
Start: 2020-01-01 | End: 2020-08-27 | Stop reason: HOSPADM

## 2020-01-01 RX ORDER — DABIGATRAN ETEXILATE 150 MG/1
150 CAPSULE ORAL EVERY 12 HOURS
Status: DISCONTINUED | OUTPATIENT
Start: 2020-01-01 | End: 2020-08-27 | Stop reason: HOSPADM

## 2020-01-01 RX ORDER — IPRATROPIUM BROMIDE AND ALBUTEROL SULFATE 2.5; .5 MG/3ML; MG/3ML
3 SOLUTION RESPIRATORY (INHALATION)
Status: DISCONTINUED | OUTPATIENT
Start: 2020-01-01 | End: 2020-01-01

## 2020-01-01 RX ORDER — ENOXAPARIN SODIUM 100 MG/ML
40 INJECTION SUBCUTANEOUS EVERY 24 HOURS
Status: DISCONTINUED | OUTPATIENT
Start: 2020-01-01 | End: 2020-01-01 | Stop reason: HOSPADM

## 2020-01-01 RX ORDER — FUROSEMIDE 10 MG/ML
SOLUTION ORAL DAILY
COMMUNITY
End: 2020-01-01

## 2020-01-01 RX ORDER — SODIUM CHLORIDE 0.9 % (FLUSH) 0.9 %
5-40 SYRINGE (ML) INJECTION AS NEEDED
Status: DISCONTINUED | OUTPATIENT
Start: 2020-01-01 | End: 2020-01-01 | Stop reason: HOSPADM

## 2020-01-01 RX ORDER — AMLODIPINE BESYLATE 10 MG/1
10 TABLET ORAL DAILY
Status: DISCONTINUED | OUTPATIENT
Start: 2020-01-01 | End: 2020-01-01

## 2020-01-01 RX ORDER — SODIUM CHLORIDE 0.9 % (FLUSH) 0.9 %
5-40 SYRINGE (ML) INJECTION EVERY 8 HOURS
Status: DISCONTINUED | OUTPATIENT
Start: 2020-01-01 | End: 2020-01-01 | Stop reason: HOSPADM

## 2020-01-01 RX ORDER — METOPROLOL TARTRATE 50 MG/1
50 TABLET ORAL EVERY 12 HOURS
Status: DISCONTINUED | OUTPATIENT
Start: 2020-01-01 | End: 2020-01-01

## 2020-01-01 RX ORDER — METOPROLOL TARTRATE 100 MG/1
100 TABLET ORAL EVERY 12 HOURS
Status: DISCONTINUED | OUTPATIENT
Start: 2020-01-01 | End: 2020-01-01 | Stop reason: HOSPADM

## 2020-01-01 RX ORDER — DIPHENHYDRAMINE HCL 25 MG
25 CAPSULE ORAL
Status: DISCONTINUED | OUTPATIENT
Start: 2020-01-01 | End: 2020-01-01 | Stop reason: HOSPADM

## 2020-01-01 RX ORDER — ALBUMIN HUMAN 250 G/1000ML
25 SOLUTION INTRAVENOUS EVERY 6 HOURS
Status: COMPLETED | OUTPATIENT
Start: 2020-01-01 | End: 2020-01-01

## 2020-01-01 RX ORDER — METOPROLOL TARTRATE 25 MG/1
50 TABLET, FILM COATED ORAL EVERY 6 HOURS
Status: DISCONTINUED | OUTPATIENT
Start: 2020-01-01 | End: 2020-01-01

## 2020-01-01 RX ORDER — SODIUM CHLORIDE 0.9 % (FLUSH) 0.9 %
10 SYRINGE (ML) INJECTION EVERY 8 HOURS
Status: DISCONTINUED | OUTPATIENT
Start: 2020-01-01 | End: 2020-08-27 | Stop reason: HOSPADM

## 2020-01-01 RX ORDER — HYDRALAZINE HYDROCHLORIDE 20 MG/ML
10 INJECTION INTRAMUSCULAR; INTRAVENOUS
Status: DISCONTINUED | OUTPATIENT
Start: 2020-01-01 | End: 2020-01-01 | Stop reason: HOSPADM

## 2020-01-01 RX ORDER — ALBUTEROL SULFATE 0.83 MG/ML
2.5 SOLUTION RESPIRATORY (INHALATION)
Status: DISCONTINUED | OUTPATIENT
Start: 2020-01-01 | End: 2020-08-27 | Stop reason: HOSPADM

## 2020-01-01 RX ORDER — GUAIFENESIN 100 MG/5ML
81 LIQUID (ML) ORAL DAILY
Status: DISCONTINUED | OUTPATIENT
Start: 2020-01-01 | End: 2020-01-01 | Stop reason: HOSPADM

## 2020-01-01 RX ORDER — METOPROLOL TARTRATE 25 MG/1
25 TABLET, FILM COATED ORAL EVERY 12 HOURS
Status: DISCONTINUED | OUTPATIENT
Start: 2020-01-01 | End: 2020-01-01

## 2020-01-01 RX ORDER — ACETAMINOPHEN 325 MG/1
650 TABLET ORAL
Qty: 20 TAB | Refills: 0 | Status: SHIPPED | OUTPATIENT
Start: 2020-01-01

## 2020-01-01 RX ORDER — ALBUTEROL SULFATE 2.5 MG/.5ML
5 SOLUTION RESPIRATORY (INHALATION)
Status: COMPLETED | OUTPATIENT
Start: 2020-01-01 | End: 2020-01-01

## 2020-01-01 RX ORDER — LOSARTAN POTASSIUM 50 MG/1
50 TABLET ORAL DAILY
Status: DISCONTINUED | OUTPATIENT
Start: 2020-01-01 | End: 2020-01-01

## 2020-01-01 RX ORDER — SODIUM CHLORIDE FOR INHALATION 3 %
4 VIAL, NEBULIZER (ML) INHALATION EVERY 12 HOURS
Status: DISCONTINUED | OUTPATIENT
Start: 2020-01-01 | End: 2020-01-01 | Stop reason: HOSPADM

## 2020-01-01 RX ORDER — ACETAMINOPHEN 325 MG/1
650 TABLET ORAL
Status: DISCONTINUED | OUTPATIENT
Start: 2020-01-01 | End: 2020-01-01 | Stop reason: HOSPADM

## 2020-01-01 RX ORDER — LOSARTAN POTASSIUM 50 MG/1
50 TABLET ORAL DAILY
Status: DISCONTINUED | OUTPATIENT
Start: 2020-01-01 | End: 2020-01-01 | Stop reason: HOSPADM

## 2020-01-01 RX ORDER — GUAIFENESIN 100 MG/5ML
81 LIQUID (ML) ORAL DAILY
COMMUNITY

## 2020-01-01 RX ORDER — METOPROLOL TARTRATE 25 MG/1
25 TABLET, FILM COATED ORAL EVERY 6 HOURS
Status: DISCONTINUED | OUTPATIENT
Start: 2020-01-01 | End: 2020-01-01

## 2020-01-01 RX ORDER — MORPHINE SULFATE 2 MG/ML
2 INJECTION, SOLUTION INTRAMUSCULAR; INTRAVENOUS
Status: DISCONTINUED | OUTPATIENT
Start: 2020-01-01 | End: 2020-08-27 | Stop reason: HOSPADM

## 2020-01-01 RX ORDER — ACETAMINOPHEN 325 MG/1
650 TABLET ORAL
Status: DISCONTINUED | OUTPATIENT
Start: 2020-01-01 | End: 2020-08-27 | Stop reason: HOSPADM

## 2020-01-01 RX ORDER — DILTIAZEM HYDROCHLORIDE 5 MG/ML
10 INJECTION INTRAVENOUS ONCE
Status: COMPLETED | OUTPATIENT
Start: 2020-01-01 | End: 2020-01-01

## 2020-01-01 RX ORDER — TRAZODONE HYDROCHLORIDE 50 MG/1
50 TABLET ORAL
Status: DISCONTINUED | OUTPATIENT
Start: 2020-01-01 | End: 2020-01-01 | Stop reason: HOSPADM

## 2020-01-01 RX ORDER — ONDANSETRON 2 MG/ML
4 INJECTION INTRAMUSCULAR; INTRAVENOUS
Status: DISCONTINUED | OUTPATIENT
Start: 2020-01-01 | End: 2020-01-01 | Stop reason: HOSPADM

## 2020-01-01 RX ORDER — ATENOLOL 25 MG/1
25 TABLET ORAL DAILY
COMMUNITY
End: 2020-01-01

## 2020-01-01 RX ORDER — IPRATROPIUM BROMIDE AND ALBUTEROL SULFATE 2.5; .5 MG/3ML; MG/3ML
3 SOLUTION RESPIRATORY (INHALATION)
Status: COMPLETED | OUTPATIENT
Start: 2020-01-01 | End: 2020-01-01

## 2020-01-01 RX ORDER — CODEINE PHOSPHATE AND GUAIFENESIN 10; 100 MG/5ML; MG/5ML
5 SOLUTION ORAL
Status: DISCONTINUED | OUTPATIENT
Start: 2020-01-01 | End: 2020-01-01 | Stop reason: HOSPADM

## 2020-01-01 RX ORDER — ATENOLOL 25 MG/1
25 TABLET ORAL DAILY
Status: DISCONTINUED | OUTPATIENT
Start: 2020-01-01 | End: 2020-01-01 | Stop reason: HOSPADM

## 2020-01-01 RX ORDER — SODIUM CHLORIDE 0.9 % (FLUSH) 0.9 %
5-40 SYRINGE (ML) INJECTION AS NEEDED
Status: DISCONTINUED | OUTPATIENT
Start: 2020-01-01 | End: 2020-08-27 | Stop reason: HOSPADM

## 2020-01-01 RX ORDER — NYSTATIN 100000 [USP'U]/G
POWDER TOPICAL 2 TIMES DAILY
Status: DISCONTINUED | OUTPATIENT
Start: 2020-01-01 | End: 2020-08-27 | Stop reason: HOSPADM

## 2020-01-01 RX ORDER — SODIUM CHLORIDE 0.9 % (FLUSH) 0.9 %
10 SYRINGE (ML) INJECTION
Status: ACTIVE | OUTPATIENT
Start: 2020-01-01 | End: 2020-01-01

## 2020-01-01 RX ORDER — ALBUTEROL SULFATE 90 UG/1
1 AEROSOL, METERED RESPIRATORY (INHALATION)
Qty: 1 INHALER | Refills: 1 | Status: SHIPPED | OUTPATIENT
Start: 2020-01-01

## 2020-01-01 RX ORDER — METOPROLOL TARTRATE 25 MG/1
12.5 TABLET, FILM COATED ORAL EVERY 12 HOURS
Status: DISCONTINUED | OUTPATIENT
Start: 2020-01-01 | End: 2020-01-01

## 2020-01-01 RX ORDER — DILTIAZEM HYDROCHLORIDE 120 MG/1
120 CAPSULE, COATED, EXTENDED RELEASE ORAL DAILY
Status: DISCONTINUED | OUTPATIENT
Start: 2020-01-01 | End: 2020-01-01

## 2020-01-01 RX ORDER — FUROSEMIDE 20 MG/1
10 TABLET ORAL DAILY
Status: DISCONTINUED | OUTPATIENT
Start: 2020-01-01 | End: 2020-01-01

## 2020-01-01 RX ORDER — POTASSIUM CHLORIDE 20 MEQ/1
20 TABLET, EXTENDED RELEASE ORAL
Status: COMPLETED | OUTPATIENT
Start: 2020-01-01 | End: 2020-01-01

## 2020-01-01 RX ORDER — LOSARTAN POTASSIUM 25 MG/1
25 TABLET ORAL DAILY
COMMUNITY
End: 2020-01-01

## 2020-01-01 RX ORDER — ERGOCALCIFEROL 1.25 MG/1
50000 CAPSULE ORAL
COMMUNITY

## 2020-01-01 RX ORDER — ONDANSETRON 4 MG/1
4 TABLET, ORALLY DISINTEGRATING ORAL
Status: DISCONTINUED | OUTPATIENT
Start: 2020-01-01 | End: 2020-01-01 | Stop reason: HOSPADM

## 2020-01-01 RX ORDER — SODIUM CHLORIDE 0.9 % (FLUSH) 0.9 %
10 SYRINGE (ML) INJECTION
Status: COMPLETED | OUTPATIENT
Start: 2020-01-01 | End: 2020-01-01

## 2020-01-01 RX ORDER — FUROSEMIDE 10 MG/ML
20 INJECTION INTRAMUSCULAR; INTRAVENOUS DAILY
Status: DISCONTINUED | OUTPATIENT
Start: 2020-01-01 | End: 2020-01-01 | Stop reason: HOSPADM

## 2020-01-01 RX ORDER — POTASSIUM CHLORIDE 20 MEQ/1
40 TABLET, EXTENDED RELEASE ORAL
Status: COMPLETED | OUTPATIENT
Start: 2020-01-01 | End: 2020-01-01

## 2020-01-01 RX ORDER — DILTIAZEM HYDROCHLORIDE 180 MG/1
180 CAPSULE, COATED, EXTENDED RELEASE ORAL DAILY
Status: DISCONTINUED | OUTPATIENT
Start: 2020-01-01 | End: 2020-01-01

## 2020-01-01 RX ORDER — AMLODIPINE BESYLATE 10 MG/1
TABLET ORAL DAILY
COMMUNITY
End: 2020-01-01

## 2020-01-01 RX ORDER — MORPHINE SULFATE 2 MG/ML
2 INJECTION, SOLUTION INTRAMUSCULAR; INTRAVENOUS
Status: DISCONTINUED | OUTPATIENT
Start: 2020-01-01 | End: 2020-01-01

## 2020-01-01 RX ORDER — LORATADINE 10 MG/1
10 TABLET ORAL
Status: DISCONTINUED | OUTPATIENT
Start: 2020-01-01 | End: 2020-01-01 | Stop reason: HOSPADM

## 2020-01-01 RX ORDER — METOPROLOL TARTRATE 50 MG/1
100 TABLET ORAL EVERY 12 HOURS
Status: DISCONTINUED | OUTPATIENT
Start: 2020-01-01 | End: 2020-01-01

## 2020-01-01 RX ORDER — AZITHROMYCIN 250 MG/1
250 TABLET, FILM COATED ORAL DAILY
Qty: 3 TAB | Refills: 0 | Status: SHIPPED | OUTPATIENT
Start: 2020-01-01 | End: 2020-01-01

## 2020-01-01 RX ORDER — FUROSEMIDE 10 MG/ML
40 INJECTION INTRAMUSCULAR; INTRAVENOUS 2 TIMES DAILY
Status: DISCONTINUED | OUTPATIENT
Start: 2020-01-01 | End: 2020-01-01

## 2020-01-01 RX ORDER — POTASSIUM CHLORIDE 14.9 MG/ML
20 INJECTION INTRAVENOUS
Status: COMPLETED | OUTPATIENT
Start: 2020-01-01 | End: 2020-01-01

## 2020-01-01 RX ORDER — DILTIAZEM HYDROCHLORIDE 180 MG/1
180 CAPSULE, COATED, EXTENDED RELEASE ORAL EVERY 12 HOURS
Status: DISCONTINUED | OUTPATIENT
Start: 2020-01-01 | End: 2020-08-27 | Stop reason: HOSPADM

## 2020-01-01 RX ORDER — ACETAMINOPHEN 325 MG/1
650 TABLET ORAL
Status: DISCONTINUED | OUTPATIENT
Start: 2020-01-01 | End: 2020-01-01 | Stop reason: SDUPTHER

## 2020-01-01 RX ORDER — MORPHINE SULFATE 10 MG/ML
4 INJECTION, SOLUTION INTRAMUSCULAR; INTRAVENOUS
Status: DISCONTINUED | OUTPATIENT
Start: 2020-01-01 | End: 2020-08-27 | Stop reason: HOSPADM

## 2020-01-01 RX ORDER — DILTIAZEM HYDROCHLORIDE 30 MG/1
30 TABLET, FILM COATED ORAL EVERY 6 HOURS
Status: DISCONTINUED | OUTPATIENT
Start: 2020-01-01 | End: 2020-01-01

## 2020-01-01 RX ORDER — LOSARTAN POTASSIUM 25 MG/1
25 TABLET ORAL DAILY
Status: DISCONTINUED | OUTPATIENT
Start: 2020-01-01 | End: 2020-01-01

## 2020-01-01 RX ORDER — FUROSEMIDE 40 MG/1
40 TABLET ORAL DAILY
Status: DISCONTINUED | OUTPATIENT
Start: 2020-01-01 | End: 2020-01-01

## 2020-01-01 RX ORDER — HYDRALAZINE HYDROCHLORIDE 20 MG/ML
10 INJECTION INTRAMUSCULAR; INTRAVENOUS
Status: DISCONTINUED | OUTPATIENT
Start: 2020-01-01 | End: 2020-01-01

## 2020-01-01 RX ORDER — VANCOMYCIN HYDROCHLORIDE
1250 EVERY 12 HOURS
Status: DISCONTINUED | OUTPATIENT
Start: 2020-01-01 | End: 2020-01-01

## 2020-01-01 RX ORDER — PROMETHAZINE HYDROCHLORIDE 25 MG/1
12.5 TABLET ORAL
Status: DISCONTINUED | OUTPATIENT
Start: 2020-01-01 | End: 2020-08-27 | Stop reason: HOSPADM

## 2020-01-01 RX ORDER — POLYETHYLENE GLYCOL 3350 17 G/17G
17 POWDER, FOR SOLUTION ORAL DAILY PRN
Status: DISCONTINUED | OUTPATIENT
Start: 2020-01-01 | End: 2020-08-27 | Stop reason: HOSPADM

## 2020-01-01 RX ORDER — LORAZEPAM 2 MG/ML
1 INJECTION INTRAMUSCULAR
Status: DISCONTINUED | OUTPATIENT
Start: 2020-01-01 | End: 2020-08-27 | Stop reason: HOSPADM

## 2020-01-01 RX ORDER — AMOXICILLIN 250 MG
1 CAPSULE ORAL
Status: DISCONTINUED | OUTPATIENT
Start: 2020-01-01 | End: 2020-01-01 | Stop reason: HOSPADM

## 2020-01-01 RX ORDER — POTASSIUM CHLORIDE 20 MEQ/1
40 TABLET, EXTENDED RELEASE ORAL 2 TIMES DAILY
Status: DISCONTINUED | OUTPATIENT
Start: 2020-01-01 | End: 2020-01-01

## 2020-01-01 RX ORDER — SPIRONOLACTONE 25 MG/1
12.5 TABLET ORAL DAILY
Status: DISCONTINUED | OUTPATIENT
Start: 2020-01-01 | End: 2020-01-01

## 2020-01-01 RX ORDER — GUAIFENESIN 100 MG/5ML
81 LIQUID (ML) ORAL DAILY
Status: DISCONTINUED | OUTPATIENT
Start: 2020-01-01 | End: 2020-08-27 | Stop reason: HOSPADM

## 2020-01-01 RX ORDER — POTASSIUM CHLORIDE 20 MEQ/1
40 TABLET, EXTENDED RELEASE ORAL DAILY
Status: DISCONTINUED | OUTPATIENT
Start: 2020-01-01 | End: 2020-08-27 | Stop reason: HOSPADM

## 2020-01-01 RX ORDER — DIGOXIN 125 MCG
0.25 TABLET ORAL 2 TIMES DAILY
Status: COMPLETED | OUTPATIENT
Start: 2020-01-01 | End: 2020-01-01

## 2020-01-01 RX ORDER — METOPROLOL TARTRATE 100 MG/1
100 TABLET ORAL EVERY 12 HOURS
Qty: 60 TAB | Refills: 1 | Status: SHIPPED | OUTPATIENT
Start: 2020-01-01

## 2020-01-01 RX ORDER — METOPROLOL TARTRATE 25 MG/1
50 TABLET, FILM COATED ORAL ONCE
Status: COMPLETED | OUTPATIENT
Start: 2020-01-01 | End: 2020-01-01

## 2020-01-01 RX ORDER — ENOXAPARIN SODIUM 100 MG/ML
40 INJECTION SUBCUTANEOUS EVERY 24 HOURS
Status: DISCONTINUED | OUTPATIENT
Start: 2020-01-01 | End: 2020-01-01

## 2020-01-01 RX ORDER — FUROSEMIDE 10 MG/ML
40 INJECTION INTRAMUSCULAR; INTRAVENOUS
Status: COMPLETED | OUTPATIENT
Start: 2020-01-01 | End: 2020-01-01

## 2020-01-01 RX ORDER — METOPROLOL TARTRATE 25 MG/1
100 TABLET, FILM COATED ORAL EVERY 12 HOURS
Status: DISCONTINUED | OUTPATIENT
Start: 2020-01-01 | End: 2020-08-27 | Stop reason: HOSPADM

## 2020-01-01 RX ORDER — GUAIFENESIN/DEXTROMETHORPHAN 100-10MG/5
10 SYRUP ORAL 3 TIMES DAILY
Status: DISCONTINUED | OUTPATIENT
Start: 2020-01-01 | End: 2020-01-01 | Stop reason: HOSPADM

## 2020-01-01 RX ORDER — SODIUM CHLORIDE 0.9 % (FLUSH) 0.9 %
5-10 SYRINGE (ML) INJECTION
Status: COMPLETED | OUTPATIENT
Start: 2020-01-01 | End: 2020-01-01

## 2020-01-01 RX ORDER — LORAZEPAM 1 MG/1
1 TABLET ORAL ONCE
Status: COMPLETED | OUTPATIENT
Start: 2020-01-01 | End: 2020-01-01

## 2020-01-01 RX ORDER — GUAIFENESIN/DEXTROMETHORPHAN 100-10MG/5
5 SYRUP ORAL
Status: DISCONTINUED | OUTPATIENT
Start: 2020-01-01 | End: 2020-01-01 | Stop reason: HOSPADM

## 2020-01-01 RX ORDER — DIGOXIN 125 MCG
0.12 TABLET ORAL DAILY
Status: DISCONTINUED | OUTPATIENT
Start: 2020-01-01 | End: 2020-08-27 | Stop reason: HOSPADM

## 2020-01-01 RX ORDER — LORATADINE 10 MG/1
10 TABLET ORAL
COMMUNITY

## 2020-01-01 RX ORDER — GUAIFENESIN/DEXTROMETHORPHAN 100-10MG/5
5 SYRUP ORAL
Qty: 1 BOTTLE | Refills: 1 | Status: SHIPPED | OUTPATIENT
Start: 2020-01-01 | End: 2020-01-01

## 2020-01-01 RX ORDER — ONDANSETRON 2 MG/ML
4 INJECTION INTRAMUSCULAR; INTRAVENOUS
Status: DISCONTINUED | OUTPATIENT
Start: 2020-01-01 | End: 2020-08-27 | Stop reason: HOSPADM

## 2020-01-01 RX ORDER — FUROSEMIDE 10 MG/ML
40 INJECTION INTRAMUSCULAR; INTRAVENOUS ONCE
Status: COMPLETED | OUTPATIENT
Start: 2020-01-01 | End: 2020-01-01

## 2020-01-01 RX ORDER — LABETALOL HYDROCHLORIDE 5 MG/ML
20 INJECTION, SOLUTION INTRAVENOUS
Status: COMPLETED | OUTPATIENT
Start: 2020-01-01 | End: 2020-01-01

## 2020-01-01 RX ORDER — ACETAMINOPHEN 650 MG/1
650 SUPPOSITORY RECTAL
Status: DISCONTINUED | OUTPATIENT
Start: 2020-01-01 | End: 2020-01-01 | Stop reason: HOSPADM

## 2020-01-01 RX ORDER — DILTIAZEM HYDROCHLORIDE 5 MG/ML
INJECTION INTRAVENOUS
Status: DISCONTINUED
Start: 2020-01-01 | End: 2020-01-01 | Stop reason: WASHOUT

## 2020-01-01 RX ORDER — ATENOLOL 50 MG/1
50 TABLET ORAL DAILY
Status: DISCONTINUED | OUTPATIENT
Start: 2020-01-01 | End: 2020-01-01

## 2020-01-01 RX ORDER — SODIUM CHLORIDE FOR INHALATION 3 %
4 VIAL, NEBULIZER (ML) INHALATION
Status: DISCONTINUED | OUTPATIENT
Start: 2020-01-01 | End: 2020-01-01 | Stop reason: HOSPADM

## 2020-01-01 RX ORDER — SODIUM CHLORIDE 0.9 % (FLUSH) 0.9 %
5-40 SYRINGE (ML) INJECTION EVERY 8 HOURS
Status: DISCONTINUED | OUTPATIENT
Start: 2020-01-01 | End: 2020-08-27 | Stop reason: HOSPADM

## 2020-01-01 RX ORDER — ALBUTEROL SULFATE 0.83 MG/ML
2.5 SOLUTION RESPIRATORY (INHALATION)
Status: DISCONTINUED | OUTPATIENT
Start: 2020-01-01 | End: 2020-01-01 | Stop reason: HOSPADM

## 2020-01-01 RX ORDER — LEVOFLOXACIN 5 MG/ML
750 INJECTION, SOLUTION INTRAVENOUS EVERY 24 HOURS
Status: DISCONTINUED | OUTPATIENT
Start: 2020-01-01 | End: 2020-01-01 | Stop reason: DRUGHIGH

## 2020-01-01 RX ORDER — LOSARTAN POTASSIUM 50 MG/1
50 TABLET ORAL DAILY
Qty: 30 TAB | Refills: 1 | Status: SHIPPED | OUTPATIENT
Start: 2020-01-01

## 2020-01-01 RX ORDER — DOXYCYCLINE 100 MG/1
100 CAPSULE ORAL EVERY 12 HOURS
Status: DISCONTINUED | OUTPATIENT
Start: 2020-01-01 | End: 2020-08-27 | Stop reason: HOSPADM

## 2020-01-01 RX ORDER — LABETALOL HYDROCHLORIDE 5 MG/ML
10 INJECTION, SOLUTION INTRAVENOUS
Status: COMPLETED | OUTPATIENT
Start: 2020-01-01 | End: 2020-01-01

## 2020-01-01 RX ORDER — ACETAMINOPHEN 650 MG/1
650 SUPPOSITORY RECTAL
Status: DISCONTINUED | OUTPATIENT
Start: 2020-01-01 | End: 2020-08-27 | Stop reason: HOSPADM

## 2020-01-01 RX ADMIN — LOSARTAN POTASSIUM 50 MG: 50 TABLET ORAL at 08:30

## 2020-01-01 RX ADMIN — DILTIAZEM HYDROCHLORIDE 30 MG: 30 TABLET, FILM COATED ORAL at 23:42

## 2020-01-01 RX ADMIN — METOPROLOL TARTRATE 25 MG: 25 TABLET, FILM COATED ORAL at 08:34

## 2020-01-01 RX ADMIN — GUAIFENESIN AND DEXTROMETHORPHAN 10 ML: 100; 10 SYRUP ORAL at 08:30

## 2020-01-01 RX ADMIN — AZITHROMYCIN 500 MG: 500 INJECTION, POWDER, LYOPHILIZED, FOR SOLUTION INTRAVENOUS at 02:52

## 2020-01-01 RX ADMIN — LABETALOL HYDROCHLORIDE 10 MG: 5 INJECTION INTRAVENOUS at 01:10

## 2020-01-01 RX ADMIN — Medication 10 ML: at 21:25

## 2020-01-01 RX ADMIN — DIGOXIN 0.12 MG: 125 TABLET ORAL at 10:03

## 2020-01-01 RX ADMIN — DILTIAZEM HYDROCHLORIDE 30 MG: 30 TABLET, FILM COATED ORAL at 11:57

## 2020-01-01 RX ADMIN — MORPHINE SULFATE 2 MG: 2 INJECTION, SOLUTION INTRAMUSCULAR; INTRAVENOUS at 13:37

## 2020-01-01 RX ADMIN — Medication 10 ML: at 05:49

## 2020-01-01 RX ADMIN — VANCOMYCIN HYDROCHLORIDE 1250 MG: 10 INJECTION, POWDER, LYOPHILIZED, FOR SOLUTION INTRAVENOUS at 02:49

## 2020-01-01 RX ADMIN — Medication 10 ML: at 14:47

## 2020-01-01 RX ADMIN — POTASSIUM CHLORIDE 20 MEQ: 200 INJECTION, SOLUTION INTRAVENOUS at 20:42

## 2020-01-01 RX ADMIN — ALBUTEROL SULFATE 2.5 MG: 2.5 SOLUTION RESPIRATORY (INHALATION) at 07:58

## 2020-01-01 RX ADMIN — LORATADINE 10 MG: 10 TABLET ORAL at 22:50

## 2020-01-01 RX ADMIN — GUAIFENESIN AND DEXTROMETHORPHAN 10 ML: 100; 10 SYRUP ORAL at 08:02

## 2020-01-01 RX ADMIN — METOPROLOL TARTRATE 50 MG: 25 TABLET, FILM COATED ORAL at 12:01

## 2020-01-01 RX ADMIN — Medication 10 ML: at 13:20

## 2020-01-01 RX ADMIN — DILTIAZEM HYDROCHLORIDE 30 MG: 30 TABLET, FILM COATED ORAL at 06:11

## 2020-01-01 RX ADMIN — ACETAMINOPHEN 650 MG: 325 TABLET, FILM COATED ORAL at 03:43

## 2020-01-01 RX ADMIN — METOPROLOL TARTRATE 50 MG: 25 TABLET, FILM COATED ORAL at 05:43

## 2020-01-01 RX ADMIN — NYSTATIN: 100000 POWDER TOPICAL at 10:03

## 2020-01-01 RX ADMIN — AZITHROMYCIN DIHYDRATE 500 MG: 500 INJECTION, POWDER, LYOPHILIZED, FOR SOLUTION INTRAVENOUS at 06:12

## 2020-01-01 RX ADMIN — Medication 10 ML: at 06:03

## 2020-01-01 RX ADMIN — LEVOFLOXACIN 750 MG: 5 INJECTION, SOLUTION INTRAVENOUS at 03:36

## 2020-01-01 RX ADMIN — Medication 10 ML: at 16:09

## 2020-01-01 RX ADMIN — ASPIRIN 81 MG: 81 TABLET, CHEWABLE ORAL at 10:28

## 2020-01-01 RX ADMIN — Medication 10 ML: at 13:50

## 2020-01-01 RX ADMIN — Medication 10 ML: at 20:36

## 2020-01-01 RX ADMIN — ATENOLOL 25 MG: 25 TABLET ORAL at 08:30

## 2020-01-01 RX ADMIN — FUROSEMIDE 40 MG: 10 INJECTION, SOLUTION INTRAMUSCULAR; INTRAVENOUS at 09:15

## 2020-01-01 RX ADMIN — IPRATROPIUM BROMIDE AND ALBUTEROL SULFATE 3 ML: .5; 3 SOLUTION RESPIRATORY (INHALATION) at 20:00

## 2020-01-01 RX ADMIN — IPRATROPIUM BROMIDE AND ALBUTEROL SULFATE 3 ML: .5; 3 SOLUTION RESPIRATORY (INHALATION) at 11:58

## 2020-01-01 RX ADMIN — IOPAMIDOL 100 ML: 755 INJECTION, SOLUTION INTRAVENOUS at 19:22

## 2020-01-01 RX ADMIN — DIGOXIN 0.12 MG: 125 TABLET ORAL at 08:34

## 2020-01-01 RX ADMIN — SPIRONOLACTONE 12.5 MG: 25 TABLET ORAL at 08:43

## 2020-01-01 RX ADMIN — ASPIRIN 81 MG: 81 TABLET, CHEWABLE ORAL at 08:47

## 2020-01-01 RX ADMIN — NYSTATIN: 100000 POWDER TOPICAL at 09:06

## 2020-01-01 RX ADMIN — DILTIAZEM HYDROCHLORIDE 10 MG: 5 INJECTION INTRAVENOUS at 10:40

## 2020-01-01 RX ADMIN — POTASSIUM CHLORIDE 40 MEQ: 20 TABLET, EXTENDED RELEASE ORAL at 09:15

## 2020-01-01 RX ADMIN — FUROSEMIDE 40 MG: 10 INJECTION, SOLUTION INTRAMUSCULAR; INTRAVENOUS at 12:59

## 2020-01-01 RX ADMIN — LORAZEPAM 1 MG: 1 TABLET ORAL at 21:46

## 2020-01-01 RX ADMIN — SODIUM CHLORIDE SOLN NEBU 3% 4 ML: 3 NEBU SOLN at 07:58

## 2020-01-01 RX ADMIN — METOPROLOL TARTRATE 25 MG: 25 TABLET, FILM COATED ORAL at 21:24

## 2020-01-01 RX ADMIN — DABIGATRAN ETEXILATE MESYLATE 150 MG: 150 CAPSULE ORAL at 22:31

## 2020-01-01 RX ADMIN — LORAZEPAM 1 MG: 2 INJECTION INTRAMUSCULAR; INTRAVENOUS at 12:48

## 2020-01-01 RX ADMIN — METOPROLOL TARTRATE 50 MG: 50 TABLET, FILM COATED ORAL at 00:05

## 2020-01-01 RX ADMIN — RIVAROXABAN 20 MG: 20 TABLET, FILM COATED ORAL at 08:28

## 2020-01-01 RX ADMIN — Medication 10 ML: at 05:19

## 2020-01-01 RX ADMIN — GUAIFENESIN AND DEXTROMETHORPHAN 10 ML: 100; 10 SYRUP ORAL at 16:06

## 2020-01-01 RX ADMIN — FUROSEMIDE 40 MG: 10 INJECTION, SOLUTION INTRAMUSCULAR; INTRAVENOUS at 17:27

## 2020-01-01 RX ADMIN — Medication 10 ML: at 15:40

## 2020-01-01 RX ADMIN — DABIGATRAN ETEXILATE MESYLATE 150 MG: 150 CAPSULE ORAL at 09:05

## 2020-01-01 RX ADMIN — DABIGATRAN ETEXILATE MESYLATE 150 MG: 150 CAPSULE ORAL at 08:47

## 2020-01-01 RX ADMIN — POTASSIUM CHLORIDE 20 MEQ: 200 INJECTION, SOLUTION INTRAVENOUS at 18:37

## 2020-01-01 RX ADMIN — DIPHENHYDRAMINE HYDROCHLORIDE 25 MG: 25 CAPSULE ORAL at 01:21

## 2020-01-01 RX ADMIN — LOSARTAN POTASSIUM 50 MG: 50 TABLET ORAL at 08:44

## 2020-01-01 RX ADMIN — DABIGATRAN ETEXILATE MESYLATE 150 MG: 150 CAPSULE ORAL at 22:10

## 2020-01-01 RX ADMIN — LOSARTAN POTASSIUM 50 MG: 50 TABLET ORAL at 08:20

## 2020-01-01 RX ADMIN — ATENOLOL 50 MG: 50 TABLET ORAL at 08:29

## 2020-01-01 RX ADMIN — ALBUTEROL SULFATE 2.5 MG: 2.5 SOLUTION RESPIRATORY (INHALATION) at 02:52

## 2020-01-01 RX ADMIN — Medication 10 ML: at 21:54

## 2020-01-01 RX ADMIN — POTASSIUM CHLORIDE 40 MEQ: 20 TABLET, EXTENDED RELEASE ORAL at 08:47

## 2020-01-01 RX ADMIN — NYSTATIN: 100000 POWDER TOPICAL at 18:04

## 2020-01-01 RX ADMIN — RIVAROXABAN 20 MG: 20 TABLET, FILM COATED ORAL at 08:30

## 2020-01-01 RX ADMIN — DILTIAZEM HYDROCHLORIDE 180 MG: 180 CAPSULE, COATED, EXTENDED RELEASE ORAL at 20:08

## 2020-01-01 RX ADMIN — SODIUM CHLORIDE SOLN NEBU 3% 4 ML: 3 NEBU SOLN at 20:55

## 2020-01-01 RX ADMIN — METOPROLOL TARTRATE 25 MG: 25 TABLET, FILM COATED ORAL at 09:06

## 2020-01-01 RX ADMIN — DILTIAZEM HYDROCHLORIDE 30 MG: 30 TABLET, FILM COATED ORAL at 06:55

## 2020-01-01 RX ADMIN — LEVOFLOXACIN 750 MG: 500 TABLET, FILM COATED ORAL at 05:07

## 2020-01-01 RX ADMIN — Medication 10 ML: at 14:03

## 2020-01-01 RX ADMIN — FUROSEMIDE 40 MG: 10 INJECTION, SOLUTION INTRAMUSCULAR; INTRAVENOUS at 06:12

## 2020-01-01 RX ADMIN — DABIGATRAN ETEXILATE MESYLATE 150 MG: 150 CAPSULE ORAL at 20:34

## 2020-01-01 RX ADMIN — DIATRIZOATE MEGLUMINE AND DIATRIZOATE SODIUM 15 ML: 600; 100 SOLUTION ORAL; RECTAL at 08:34

## 2020-01-01 RX ADMIN — MORPHINE SULFATE 2 MG: 2 INJECTION, SOLUTION INTRAMUSCULAR; INTRAVENOUS at 14:21

## 2020-01-01 RX ADMIN — FUROSEMIDE 20 MG: 10 INJECTION, SOLUTION INTRAMUSCULAR; INTRAVENOUS at 08:45

## 2020-01-01 RX ADMIN — Medication 10 ML: at 13:30

## 2020-01-01 RX ADMIN — SODIUM CHLORIDE SOLN NEBU 3% 4 ML: 3 NEBU SOLN at 07:10

## 2020-01-01 RX ADMIN — DOXYCYCLINE HYCLATE 100 MG: 100 CAPSULE ORAL at 12:01

## 2020-01-01 RX ADMIN — ASPIRIN 81 MG: 81 TABLET, CHEWABLE ORAL at 09:06

## 2020-01-01 RX ADMIN — AZITHROMYCIN DIHYDRATE 500 MG: 500 INJECTION, POWDER, LYOPHILIZED, FOR SOLUTION INTRAVENOUS at 06:11

## 2020-01-01 RX ADMIN — GUAIFENESIN AND DEXTROMETHORPHAN 10 ML: 100; 10 SYRUP ORAL at 08:28

## 2020-01-01 RX ADMIN — FUROSEMIDE 20 MG: 10 INJECTION, SOLUTION INTRAMUSCULAR; INTRAVENOUS at 08:29

## 2020-01-01 RX ADMIN — DOXYCYCLINE HYCLATE 100 MG: 100 CAPSULE ORAL at 10:02

## 2020-01-01 RX ADMIN — ENOXAPARIN SODIUM 40 MG: 40 INJECTION SUBCUTANEOUS at 08:19

## 2020-01-01 RX ADMIN — POTASSIUM CHLORIDE 40 MEQ: 20 TABLET, EXTENDED RELEASE ORAL at 08:43

## 2020-01-01 RX ADMIN — SODIUM CHLORIDE SOLN NEBU 3% 4 ML: 3 NEBU SOLN at 15:00

## 2020-01-01 RX ADMIN — Medication 10 ML: at 13:32

## 2020-01-01 RX ADMIN — DOXYCYCLINE HYCLATE 100 MG: 100 CAPSULE ORAL at 20:08

## 2020-01-01 RX ADMIN — DABIGATRAN ETEXILATE MESYLATE 150 MG: 150 CAPSULE ORAL at 09:25

## 2020-01-01 RX ADMIN — DABIGATRAN ETEXILATE MESYLATE 150 MG: 150 CAPSULE ORAL at 20:08

## 2020-01-01 RX ADMIN — POTASSIUM CHLORIDE 20 MEQ: 14.9 INJECTION, SOLUTION INTRAVENOUS at 05:43

## 2020-01-01 RX ADMIN — DIGOXIN 0.12 MG: 125 TABLET ORAL at 09:25

## 2020-01-01 RX ADMIN — Medication 10 ML: at 06:30

## 2020-01-01 RX ADMIN — DILTIAZEM HYDROCHLORIDE 30 MG: 30 TABLET, FILM COATED ORAL at 23:12

## 2020-01-01 RX ADMIN — IPRATROPIUM BROMIDE AND ALBUTEROL SULFATE 3 ML: .5; 3 SOLUTION RESPIRATORY (INHALATION) at 01:50

## 2020-01-01 RX ADMIN — ASPIRIN 81 MG: 81 TABLET, CHEWABLE ORAL at 08:43

## 2020-01-01 RX ADMIN — LEVOFLOXACIN 750 MG: 5 INJECTION, SOLUTION INTRAVENOUS at 04:37

## 2020-01-01 RX ADMIN — VANCOMYCIN HYDROCHLORIDE 1250 MG: 10 INJECTION, POWDER, LYOPHILIZED, FOR SOLUTION INTRAVENOUS at 13:40

## 2020-01-01 RX ADMIN — LOSARTAN POTASSIUM 50 MG: 50 TABLET, FILM COATED ORAL at 09:15

## 2020-01-01 RX ADMIN — LOSARTAN POTASSIUM 50 MG: 50 TABLET, FILM COATED ORAL at 08:30

## 2020-01-01 RX ADMIN — ATENOLOL 25 MG: 25 TABLET ORAL at 08:44

## 2020-01-01 RX ADMIN — FUROSEMIDE 40 MG: 10 INJECTION, SOLUTION INTRAMUSCULAR; INTRAVENOUS at 22:11

## 2020-01-01 RX ADMIN — Medication 10 ML: at 05:07

## 2020-01-01 RX ADMIN — ENOXAPARIN SODIUM 40 MG: 40 INJECTION SUBCUTANEOUS at 08:44

## 2020-01-01 RX ADMIN — DILTIAZEM HYDROCHLORIDE 30 MG: 30 TABLET, FILM COATED ORAL at 11:44

## 2020-01-01 RX ADMIN — RIVAROXABAN 20 MG: 20 TABLET, FILM COATED ORAL at 08:02

## 2020-01-01 RX ADMIN — IPRATROPIUM BROMIDE AND ALBUTEROL SULFATE 3 ML: .5; 3 SOLUTION RESPIRATORY (INHALATION) at 16:03

## 2020-01-01 RX ADMIN — Medication 10 ML: at 18:51

## 2020-01-01 RX ADMIN — DABIGATRAN ETEXILATE MESYLATE 150 MG: 150 CAPSULE ORAL at 10:02

## 2020-01-01 RX ADMIN — FUROSEMIDE 40 MG: 10 INJECTION, SOLUTION INTRAMUSCULAR; INTRAVENOUS at 09:06

## 2020-01-01 RX ADMIN — IPRATROPIUM BROMIDE AND ALBUTEROL SULFATE 3 ML: .5; 3 SOLUTION RESPIRATORY (INHALATION) at 07:09

## 2020-01-01 RX ADMIN — DILTIAZEM HYDROCHLORIDE 180 MG: 180 CAPSULE, COATED, EXTENDED RELEASE ORAL at 09:25

## 2020-01-01 RX ADMIN — FUROSEMIDE 40 MG: 10 INJECTION, SOLUTION INTRAMUSCULAR; INTRAVENOUS at 08:43

## 2020-01-01 RX ADMIN — Medication 10 ML: at 13:00

## 2020-01-01 RX ADMIN — ATENOLOL 25 MG: 25 TABLET ORAL at 08:20

## 2020-01-01 RX ADMIN — FUROSEMIDE 10 MG/HR: 10 INJECTION, SOLUTION INTRAMUSCULAR; INTRAVENOUS at 13:32

## 2020-01-01 RX ADMIN — Medication 10 ML: at 23:42

## 2020-01-01 RX ADMIN — POTASSIUM CHLORIDE 20 MEQ: 20 TABLET, EXTENDED RELEASE ORAL at 08:02

## 2020-01-01 RX ADMIN — SODIUM CHLORIDE 15 MG/HR: 900 INJECTION, SOLUTION INTRAVENOUS at 05:25

## 2020-01-01 RX ADMIN — DILTIAZEM HYDROCHLORIDE 30 MG: 30 TABLET, FILM COATED ORAL at 17:00

## 2020-01-01 RX ADMIN — SODIUM CHLORIDE 10 MG/HR: 900 INJECTION, SOLUTION INTRAVENOUS at 10:49

## 2020-01-01 RX ADMIN — AMLODIPINE BESYLATE 10 MG: 10 TABLET ORAL at 08:44

## 2020-01-01 RX ADMIN — ALBUMIN (HUMAN) 25 G: 0.25 INJECTION, SOLUTION INTRAVENOUS at 17:01

## 2020-01-01 RX ADMIN — LABETALOL HYDROCHLORIDE 20 MG: 5 INJECTION INTRAVENOUS at 02:20

## 2020-01-01 RX ADMIN — Medication 10 ML: at 22:00

## 2020-01-01 RX ADMIN — Medication 10 ML: at 05:01

## 2020-01-01 RX ADMIN — SODIUM CHLORIDE SOLN NEBU 3% 4 ML: 3 NEBU SOLN at 19:45

## 2020-01-01 RX ADMIN — DABIGATRAN ETEXILATE MESYLATE 150 MG: 150 CAPSULE ORAL at 08:42

## 2020-01-01 RX ADMIN — ASPIRIN 81 MG: 81 TABLET, CHEWABLE ORAL at 09:15

## 2020-01-01 RX ADMIN — Medication 10 ML: at 05:24

## 2020-01-01 RX ADMIN — POTASSIUM CHLORIDE 20 MEQ: 14.9 INJECTION, SOLUTION INTRAVENOUS at 10:04

## 2020-01-01 RX ADMIN — LORATADINE 10 MG: 10 TABLET ORAL at 22:49

## 2020-01-01 RX ADMIN — ALBUTEROL SULFATE 2.5 MG: 2.5 SOLUTION RESPIRATORY (INHALATION) at 19:26

## 2020-01-01 RX ADMIN — ENOXAPARIN SODIUM 40 MG: 40 INJECTION SUBCUTANEOUS at 08:30

## 2020-01-01 RX ADMIN — Medication 5 ML: at 05:45

## 2020-01-01 RX ADMIN — DABIGATRAN ETEXILATE MESYLATE 150 MG: 150 CAPSULE ORAL at 09:15

## 2020-01-01 RX ADMIN — NYSTATIN: 100000 POWDER TOPICAL at 17:30

## 2020-01-01 RX ADMIN — NYSTATIN: 100000 POWDER TOPICAL at 09:26

## 2020-01-01 RX ADMIN — SODIUM CHLORIDE 15 MG/HR: 900 INJECTION, SOLUTION INTRAVENOUS at 15:13

## 2020-01-01 RX ADMIN — TRAZODONE HYDROCHLORIDE 50 MG: 50 TABLET ORAL at 22:50

## 2020-01-01 RX ADMIN — ACETAZOLAMIDE SODIUM 250 MG: 500 INJECTION, POWDER, LYOPHILIZED, FOR SOLUTION INTRAVENOUS at 12:55

## 2020-01-01 RX ADMIN — DILTIAZEM HYDROCHLORIDE 10 MG: 5 INJECTION INTRAVENOUS at 12:56

## 2020-01-01 RX ADMIN — FUROSEMIDE 40 MG: 10 INJECTION, SOLUTION INTRAMUSCULAR; INTRAVENOUS at 17:52

## 2020-01-01 RX ADMIN — FUROSEMIDE 20 MG: 10 INJECTION, SOLUTION INTRAMUSCULAR; INTRAVENOUS at 08:20

## 2020-01-01 RX ADMIN — ASPIRIN 81 MG: 81 TABLET, CHEWABLE ORAL at 09:25

## 2020-01-01 RX ADMIN — Medication 10 ML: at 05:30

## 2020-01-01 RX ADMIN — NYSTATIN: 100000 POWDER TOPICAL at 09:15

## 2020-01-01 RX ADMIN — ASPIRIN 81 MG 81 MG: 81 TABLET ORAL at 08:30

## 2020-01-01 RX ADMIN — Medication 10 ML: at 20:09

## 2020-01-01 RX ADMIN — IOPAMIDOL 100 ML: 755 INJECTION, SOLUTION INTRAVENOUS at 18:51

## 2020-01-01 RX ADMIN — ALBUTEROL SULFATE 2.5 MG: 2.5 SOLUTION RESPIRATORY (INHALATION) at 01:40

## 2020-01-01 RX ADMIN — METOPROLOL TARTRATE 50 MG: 50 TABLET, FILM COATED ORAL at 18:13

## 2020-01-01 RX ADMIN — METOPROLOL TARTRATE 50 MG: 25 TABLET, FILM COATED ORAL at 22:11

## 2020-01-01 RX ADMIN — NYSTATIN: 100000 POWDER TOPICAL at 08:49

## 2020-01-01 RX ADMIN — FUROSEMIDE 40 MG: 10 INJECTION, SOLUTION INTRAMUSCULAR; INTRAVENOUS at 08:34

## 2020-01-01 RX ADMIN — ATENOLOL 50 MG: 50 TABLET ORAL at 08:02

## 2020-01-01 RX ADMIN — METOPROLOL TARTRATE 100 MG: 50 TABLET, FILM COATED ORAL at 09:15

## 2020-01-01 RX ADMIN — POTASSIUM CHLORIDE 40 MEQ: 20 TABLET, EXTENDED RELEASE ORAL at 08:44

## 2020-01-01 RX ADMIN — LOSARTAN POTASSIUM 50 MG: 50 TABLET, FILM COATED ORAL at 08:02

## 2020-01-01 RX ADMIN — ASPIRIN 81 MG 81 MG: 81 TABLET ORAL at 08:29

## 2020-01-01 RX ADMIN — GUAIFENESIN AND DEXTROMETHORPHAN 10 ML: 100; 10 SYRUP ORAL at 17:33

## 2020-01-01 RX ADMIN — LOSARTAN POTASSIUM 50 MG: 50 TABLET, FILM COATED ORAL at 08:58

## 2020-01-01 RX ADMIN — SODIUM CHLORIDE SOLN NEBU 3% 4 ML: 3 NEBU SOLN at 19:58

## 2020-01-01 RX ADMIN — METOPROLOL TARTRATE 100 MG: 50 TABLET, FILM COATED ORAL at 08:29

## 2020-01-01 RX ADMIN — Medication 10 ML: at 16:05

## 2020-01-01 RX ADMIN — METOPROLOL TARTRATE 50 MG: 25 TABLET, FILM COATED ORAL at 17:01

## 2020-01-01 RX ADMIN — METOLAZONE 5 MG: 5 TABLET ORAL at 10:02

## 2020-01-01 RX ADMIN — METOPROLOL TARTRATE 25 MG: 25 TABLET, FILM COATED ORAL at 20:34

## 2020-01-01 RX ADMIN — DILTIAZEM HYDROCHLORIDE 180 MG: 180 CAPSULE, COATED, EXTENDED RELEASE ORAL at 10:02

## 2020-01-01 RX ADMIN — NYSTATIN: 100000 POWDER TOPICAL at 08:48

## 2020-01-01 RX ADMIN — FUROSEMIDE 40 MG: 10 INJECTION, SOLUTION INTRAMUSCULAR; INTRAVENOUS at 18:27

## 2020-01-01 RX ADMIN — IPRATROPIUM BROMIDE AND ALBUTEROL SULFATE 3 ML: .5; 3 SOLUTION RESPIRATORY (INHALATION) at 20:55

## 2020-01-01 RX ADMIN — NYSTATIN: 100000 POWDER TOPICAL at 18:00

## 2020-01-01 RX ADMIN — ALBUTEROL SULFATE 5 MG: 2.5 SOLUTION RESPIRATORY (INHALATION) at 16:43

## 2020-01-01 RX ADMIN — POTASSIUM CHLORIDE 40 MEQ: 20 TABLET, EXTENDED RELEASE ORAL at 17:14

## 2020-01-01 RX ADMIN — FUROSEMIDE 10 MG: 20 TABLET ORAL at 08:58

## 2020-01-01 RX ADMIN — IPRATROPIUM BROMIDE AND ALBUTEROL SULFATE 3 ML: .5; 3 SOLUTION RESPIRATORY (INHALATION) at 15:59

## 2020-01-01 RX ADMIN — POTASSIUM CHLORIDE 40 MEQ: 20 TABLET, EXTENDED RELEASE ORAL at 18:03

## 2020-01-01 RX ADMIN — AZITHROMYCIN DIHYDRATE 500 MG: 500 INJECTION, POWDER, LYOPHILIZED, FOR SOLUTION INTRAVENOUS at 05:00

## 2020-01-01 RX ADMIN — DILTIAZEM HYDROCHLORIDE 30 MG: 30 TABLET, FILM COATED ORAL at 17:20

## 2020-01-01 RX ADMIN — DABIGATRAN ETEXILATE MESYLATE 150 MG: 150 CAPSULE ORAL at 21:24

## 2020-01-01 RX ADMIN — Medication 10 ML: at 22:12

## 2020-01-01 RX ADMIN — SODIUM CHLORIDE 15 MG/HR: 900 INJECTION, SOLUTION INTRAVENOUS at 22:44

## 2020-01-01 RX ADMIN — FUROSEMIDE 10 MG/HR: 10 INJECTION, SOLUTION INTRAMUSCULAR; INTRAVENOUS at 16:07

## 2020-01-01 RX ADMIN — IPRATROPIUM BROMIDE AND ALBUTEROL SULFATE 3 ML: .5; 3 SOLUTION RESPIRATORY (INHALATION) at 07:12

## 2020-01-01 RX ADMIN — FUROSEMIDE 40 MG: 10 INJECTION, SOLUTION INTRAMUSCULAR; INTRAVENOUS at 18:03

## 2020-01-01 RX ADMIN — DIGOXIN 0.25 MG: 125 TABLET ORAL at 09:58

## 2020-01-01 RX ADMIN — NYSTATIN: 100000 POWDER TOPICAL at 09:00

## 2020-01-01 RX ADMIN — Medication 10 ML: at 21:40

## 2020-01-01 RX ADMIN — GUAIFENESIN AND DEXTROMETHORPHAN 10 ML: 100; 10 SYRUP ORAL at 15:53

## 2020-01-01 RX ADMIN — DABIGATRAN ETEXILATE MESYLATE 150 MG: 150 CAPSULE ORAL at 08:34

## 2020-01-01 RX ADMIN — ATENOLOL 50 MG: 50 TABLET ORAL at 05:28

## 2020-01-01 RX ADMIN — DILTIAZEM HYDROCHLORIDE 180 MG: 180 CAPSULE, COATED, EXTENDED RELEASE ORAL at 10:13

## 2020-01-01 RX ADMIN — POTASSIUM CHLORIDE 40 MEQ: 20 TABLET, EXTENDED RELEASE ORAL at 10:03

## 2020-01-01 RX ADMIN — Medication 10 ML: at 17:33

## 2020-01-01 RX ADMIN — IPRATROPIUM BROMIDE AND ALBUTEROL SULFATE 3 ML: .5; 3 SOLUTION RESPIRATORY (INHALATION) at 16:43

## 2020-01-01 RX ADMIN — SODIUM CHLORIDE SOLN NEBU 3% 4 ML: 3 NEBU SOLN at 07:12

## 2020-01-01 RX ADMIN — SODIUM CHLORIDE SOLN NEBU 3% 4 ML: 3 NEBU SOLN at 07:45

## 2020-01-01 RX ADMIN — POTASSIUM CHLORIDE 40 MEQ: 20 TABLET, EXTENDED RELEASE ORAL at 09:05

## 2020-01-01 RX ADMIN — LOSARTAN POTASSIUM 25 MG: 25 TABLET, FILM COATED ORAL at 08:43

## 2020-01-01 RX ADMIN — Medication 10 ML: at 05:56

## 2020-01-01 RX ADMIN — ALBUTEROL SULFATE 2.5 MG: 2.5 SOLUTION RESPIRATORY (INHALATION) at 15:28

## 2020-01-01 RX ADMIN — METOPROLOL TARTRATE 100 MG: 50 TABLET, FILM COATED ORAL at 22:10

## 2020-01-01 RX ADMIN — LOSARTAN POTASSIUM 50 MG: 50 TABLET, FILM COATED ORAL at 08:29

## 2020-01-01 RX ADMIN — Medication 5 ML: at 22:33

## 2020-01-01 RX ADMIN — FUROSEMIDE 10 MG/HR: 10 INJECTION, SOLUTION INTRAMUSCULAR; INTRAVENOUS at 21:39

## 2020-01-01 RX ADMIN — Medication 20 ML: at 16:09

## 2020-01-01 RX ADMIN — DILTIAZEM HYDROCHLORIDE 120 MG: 120 CAPSULE, COATED, EXTENDED RELEASE ORAL at 09:15

## 2020-01-01 RX ADMIN — POTASSIUM CHLORIDE 40 MEQ: 20 TABLET, EXTENDED RELEASE ORAL at 17:27

## 2020-01-01 RX ADMIN — METHYLPREDNISOLONE SODIUM SUCCINATE 125 MG: 125 INJECTION, POWDER, FOR SOLUTION INTRAMUSCULAR; INTRAVENOUS at 02:51

## 2020-01-01 RX ADMIN — ALBUTEROL SULFATE 2.5 MG: 2.5 SOLUTION RESPIRATORY (INHALATION) at 07:50

## 2020-01-01 RX ADMIN — METOPROLOL TARTRATE 25 MG: 25 TABLET, FILM COATED ORAL at 22:31

## 2020-01-01 RX ADMIN — Medication 10 ML: at 18:38

## 2020-01-01 RX ADMIN — PERFLUTREN 1 ML: 6.52 INJECTION, SUSPENSION INTRAVENOUS at 13:00

## 2020-01-01 RX ADMIN — ASPIRIN 81 MG 81 MG: 81 TABLET ORAL at 08:02

## 2020-01-01 RX ADMIN — ALBUMIN (HUMAN) 25 G: 0.25 INJECTION, SOLUTION INTRAVENOUS at 11:16

## 2020-01-01 RX ADMIN — ALBUTEROL SULFATE 2.5 MG: 2.5 SOLUTION RESPIRATORY (INHALATION) at 19:58

## 2020-01-01 RX ADMIN — ASPIRIN 81 MG 81 MG: 81 TABLET ORAL at 08:44

## 2020-01-01 RX ADMIN — Medication 10 ML: at 12:07

## 2020-01-01 RX ADMIN — SODIUM CHLORIDE SOLN NEBU 3% 4 ML: 3 NEBU SOLN at 20:00

## 2020-01-01 RX ADMIN — Medication 10 ML: at 21:41

## 2020-01-01 RX ADMIN — ALBUMIN (HUMAN) 25 G: 0.25 INJECTION, SOLUTION INTRAVENOUS at 05:43

## 2020-01-01 RX ADMIN — ALBUTEROL SULFATE 2.5 MG: 2.5 SOLUTION RESPIRATORY (INHALATION) at 15:00

## 2020-01-01 RX ADMIN — FUROSEMIDE 40 MG: 10 INJECTION, SOLUTION INTRAMUSCULAR; INTRAVENOUS at 17:14

## 2020-01-01 RX ADMIN — DILTIAZEM HYDROCHLORIDE 120 MG: 120 CAPSULE, COATED, EXTENDED RELEASE ORAL at 08:43

## 2020-01-01 RX ADMIN — DILTIAZEM HYDROCHLORIDE 120 MG: 120 CAPSULE, COATED, EXTENDED RELEASE ORAL at 11:42

## 2020-01-01 RX ADMIN — Medication 10 ML: at 06:11

## 2020-01-01 RX ADMIN — DILTIAZEM HYDROCHLORIDE 120 MG: 120 CAPSULE, COATED, EXTENDED RELEASE ORAL at 09:05

## 2020-01-01 RX ADMIN — SODIUM CHLORIDE 1000 ML: 9 INJECTION, SOLUTION INTRAVENOUS at 01:11

## 2020-01-01 RX ADMIN — SODIUM CHLORIDE SOLN NEBU 3% 4 ML: 3 NEBU SOLN at 07:50

## 2020-01-01 RX ADMIN — POTASSIUM CHLORIDE 40 MEQ: 20 TABLET, EXTENDED RELEASE ORAL at 12:10

## 2020-01-01 RX ADMIN — NYSTATIN: 100000 POWDER TOPICAL at 22:11

## 2020-01-01 RX ADMIN — IPRATROPIUM BROMIDE AND ALBUTEROL SULFATE 3 ML: .5; 3 SOLUTION RESPIRATORY (INHALATION) at 19:45

## 2020-01-01 RX ADMIN — IPRATROPIUM BROMIDE AND ALBUTEROL SULFATE 3 ML: .5; 3 SOLUTION RESPIRATORY (INHALATION) at 13:01

## 2020-01-01 RX ADMIN — FUROSEMIDE 40 MG: 40 TABLET ORAL at 08:28

## 2020-01-01 RX ADMIN — FUROSEMIDE 10 MG/HR: 10 INJECTION, SOLUTION INTRAMUSCULAR; INTRAVENOUS at 01:46

## 2020-01-01 RX ADMIN — POTASSIUM CHLORIDE 40 MEQ: 20 TABLET, EXTENDED RELEASE ORAL at 15:36

## 2020-01-01 RX ADMIN — FUROSEMIDE 40 MG: 10 INJECTION, SOLUTION INTRAMUSCULAR; INTRAVENOUS at 11:41

## 2020-01-01 RX ADMIN — FUROSEMIDE 10 MG/HR: 10 INJECTION, SOLUTION INTRAMUSCULAR; INTRAVENOUS at 07:20

## 2020-01-01 RX ADMIN — DIGOXIN 0.25 MG: 125 TABLET ORAL at 17:29

## 2020-01-01 RX ADMIN — METOPROLOL TARTRATE 25 MG: 25 TABLET, FILM COATED ORAL at 08:43

## 2020-01-01 RX ADMIN — ALBUMIN (HUMAN) 25 G: 0.25 INJECTION, SOLUTION INTRAVENOUS at 23:54

## 2020-01-01 RX ADMIN — Medication 10 ML: at 22:13

## 2020-01-01 RX ADMIN — POTASSIUM CHLORIDE 40 MEQ: 20 TABLET, EXTENDED RELEASE ORAL at 08:34

## 2020-01-01 RX ADMIN — FUROSEMIDE 40 MG: 40 TABLET ORAL at 08:02

## 2020-01-01 RX ADMIN — Medication 10 ML: at 19:22

## 2020-01-01 RX ADMIN — ASPIRIN 81 MG: 81 TABLET, CHEWABLE ORAL at 08:34

## 2020-01-01 RX ADMIN — NYSTATIN: 100000 POWDER TOPICAL at 20:08

## 2020-01-01 RX ADMIN — ASPIRIN 81 MG 81 MG: 81 TABLET ORAL at 08:58

## 2020-01-01 RX ADMIN — IPRATROPIUM BROMIDE AND ALBUTEROL SULFATE 3 ML: .5; 3 SOLUTION RESPIRATORY (INHALATION) at 07:45

## 2020-01-01 RX ADMIN — POTASSIUM CHLORIDE 40 MEQ: 20 TABLET, EXTENDED RELEASE ORAL at 10:26

## 2020-01-01 RX ADMIN — NYSTATIN: 100000 POWDER TOPICAL at 17:25

## 2020-01-01 RX ADMIN — AMLODIPINE BESYLATE 10 MG: 10 TABLET ORAL at 08:20

## 2020-01-01 RX ADMIN — SODIUM CHLORIDE 100 ML: 900 INJECTION, SOLUTION INTRAVENOUS at 18:51

## 2020-01-01 RX ADMIN — ASPIRIN 81 MG 81 MG: 81 TABLET ORAL at 08:20

## 2020-01-01 RX ADMIN — Medication 10 ML: at 05:15

## 2020-01-01 RX ADMIN — METOPROLOL TARTRATE 25 MG: 25 TABLET, FILM COATED ORAL at 02:49

## 2020-01-01 RX ADMIN — DABIGATRAN ETEXILATE MESYLATE 150 MG: 150 CAPSULE ORAL at 22:48

## 2020-01-01 RX ADMIN — SODIUM CHLORIDE 100 ML: 900 INJECTION, SOLUTION INTRAVENOUS at 19:22

## 2020-05-04 PROBLEM — J18.9 PNEUMONIA: Status: ACTIVE | Noted: 2020-01-01

## 2020-05-04 NOTE — ED PROVIDER NOTES
45-year-old female patient with history of hypertension and bronchiectasis presents to the emergency department with reports of worsening shortness of breath and noted hypoxia. Patient states symptoms started approximately 4 weeks ago and have been progressive in nature since onset. There is worsening shortness of breath with ambulation. She denies any significant pain pressure or tightness in her chest however. She states symptoms are consistent with previous bouts of bronchiectasis. She does not require oxygen at home. She received an albuterol treatment in route and is feeling better at present. Patient was apparently seen in local internal medicine office, EMS providers were called secondary to patient's reported symptoms. EMS reports initial stable saturations however when patient was moved to the stretcher, she became very winded and short of breath registering a oxygen saturation of 75%. Patient feels much better at this time and states her breathing is at baseline. Not require oxygen at home. She does not use albuterol on a regular basis. She denies any known sick contacts or recent travel. Past Medical History:  
Diagnosis Date  Bronchiectasis ([facility]  Congestive heart failure (CHF) ([facility]  Pneumonia Past Surgical History:  
Procedure Laterality Date  HX BACK SURGERY    
 HX HYSTERECTOMY History reviewed. No pertinent family history. Social History Socioeconomic History  Marital status:  Spouse name: Not on file  Number of children: Not on file  Years of education: Not on file  Highest education level: Not on file Occupational History  Not on file Social Needs  Financial resource strain: Not on file  Food insecurity Worry: Not on file Inability: Not on file  Transportation needs Medical: Not on file Non-medical: Not on file Tobacco Use  Smoking status: Never Smoker  Smokeless tobacco: Never Used Substance and Sexual Activity  Alcohol use: Never Frequency: Never  Drug use: Never  Sexual activity: Not on file Lifestyle  Physical activity Days per week: Not on file Minutes per session: Not on file  Stress: Not on file Relationships  Social connections Talks on phone: Not on file Gets together: Not on file Attends Holiness service: Not on file Active member of club or organization: Not on file Attends meetings of clubs or organizations: Not on file Relationship status: Not on file  Intimate partner violence Fear of current or ex partner: Not on file Emotionally abused: Not on file Physically abused: Not on file Forced sexual activity: Not on file Other Topics Concern  Not on file Social History Narrative  Not on file ALLERGIES: Pseudoephedrine Review of Systems Constitutional: Negative for chills, diaphoresis and fever. HENT: Negative for congestion, sneezing and sore throat. Eyes: Negative for visual disturbance. Respiratory: Positive for cough and shortness of breath. Negative for chest tightness and wheezing. Cardiovascular: Negative for chest pain and leg swelling. Gastrointestinal: Negative for abdominal pain, blood in stool, diarrhea, nausea and vomiting. Endocrine: Negative for polyuria. Genitourinary: Negative for difficulty urinating, dysuria, flank pain, hematuria and urgency. Musculoskeletal: Negative for back pain, myalgias, neck pain and neck stiffness. Skin: Negative for color change and rash. Neurological: Negative for dizziness, syncope, speech difficulty, weakness, light-headedness, numbness and headaches. Psychiatric/Behavioral: Negative for behavioral problems. All other systems reviewed and are negative. Vitals:  
 05/04/20 1659 BP: 135/90 Pulse: 91  
Resp: 18 Temp: 97 °F (36.1 °C) SpO2: 98% Weight: 117.9 kg (260 lb) Height: 5' 2\" (1.575 m) Physical Exam 
Vitals signs and nursing note reviewed. Constitutional:   
   General: She is not in acute distress. Appearance: She is well-developed. She is not diaphoretic. Comments: Elderly though generally well-appearing female patient, alert and oriented to person place and time. No acute distress, speaks in clear, fluid sentences. HENT:  
   Head: Normocephalic and atraumatic. Right Ear: External ear normal.  
   Left Ear: External ear normal.  
   Nose: Nose normal.  
Eyes:  
   Pupils: Pupils are equal, round, and reactive to light. Neck: Musculoskeletal: Normal range of motion. Cardiovascular:  
   Rate and Rhythm: Normal rate and regular rhythm. Heart sounds: Normal heart sounds. No murmur. No friction rub. No gallop. Pulmonary:  
   Effort: Pulmonary effort is normal. No respiratory distress. Breath sounds: Normal breath sounds. No stridor. No decreased breath sounds, wheezing, rhonchi or rales. Comments: Diminished, faint crackles noted bilaterally. No respiratory distress or difficulty noted. Chest:  
   Chest wall: No tenderness. Abdominal:  
   General: There is no distension. Palpations: Abdomen is soft. There is no mass. Tenderness: There is no abdominal tenderness. There is no guarding or rebound. Hernia: No hernia is present. Musculoskeletal: Normal range of motion. General: No tenderness or deformity. Comments: Lateral lower extremity edema noted. 2+ pitting extends from the feet bilaterally to the knees. Skin: 
   General: Skin is warm and dry. Neurological:  
   Mental Status: She is alert and oriented to person, place, and time. Cranial Nerves: No cranial nerve deficit. MDM Number of Diagnoses or Management Options Diagnosis management comments: Attempted room air trial, patient became very short of breath and hypoxic with oxygen saturations of 77%.   Placed back on nasal cannula O2 with normalization of work of breathing and oxygen saturation. Amount and/or Complexity of Data Reviewed Clinical lab tests: ordered and reviewed Tests in the radiology section of CPT®: ordered and reviewed Tests in the medicine section of CPT®: ordered and reviewed Independent visualization of images, tracings, or specimens: yes Risk of Complications, Morbidity, and/or Mortality Presenting problems: moderate Diagnostic procedures: low Management options: moderate Patient Progress Patient progress: stable Procedures

## 2020-05-04 NOTE — ED TRIAGE NOTES
Pt arrives from [facility] [place] by EMS wearing a mask. Complains of congestion and cough x one week. Hx of pneumonia. EMS states they were told questionable chest xray today for pneumonia or CHF. Given 2.5mg Alubterol at office. EMS states 75% on RA for them. On 4L by NC 95%. No hx of needing oxygen. No meds by EMS. 20g R hand by EMS. . 152/96

## 2020-05-04 NOTE — ED NOTES
Attempted pt off oxgyen. Pt unable to tolerate. Dropped down to 77% with visible increased work of breathing.  Placed by on 3L by NC.

## 2020-05-05 PROBLEM — J47.9 BRONCHIECTASIS (HCC): Status: ACTIVE | Noted: 2020-01-01

## 2020-05-05 PROBLEM — Z20.822 SUSPECTED COVID-19 VIRUS INFECTION: Status: ACTIVE | Noted: 2020-01-01

## 2020-05-05 PROBLEM — I10 HYPERTENSION: Status: ACTIVE | Noted: 2020-01-01

## 2020-05-05 NOTE — PROGRESS NOTES
Assessment complete via flow sheet. Pt A&Ox4. Respirations even and unlabored. S1 S2 auscultated. Bowel sounds active, abdomen soft. Denies other needs. Bed in lowest position, side rails up x3, call bell in reach. Instructed to call for assistance. Pt verbalized understanding. Plan of care reviewed with patient.

## 2020-05-05 NOTE — ED NOTES
TRANSFER - OUT REPORT: 
 
Verbal report given to Sandra Jayy 71 on [employer]  being transferred to [address] [place] [address] for routine progression of care Report consisted of patients Situation, Background, Assessment and  
Recommendations(SBAR). Information from the following report(s) SBAR, ED Summary, Intake/Output and MAR was reviewed with the receiving nurse. Lines:  
Peripheral IV 05/04/20 Right Hand (Active) Site Assessment Clean, dry, & intact 5/4/2020  6:16 PM  
  
 
Opportunity for questions and clarification was provided.    
 
Patient transported with: 
 Tech  
o2 2lpm NC

## 2020-05-05 NOTE — PROGRESS NOTES
TRANSFER - IN REPORT: 
 
Verbal report received from Fausto Griffiths RN(name) on [employer]  being received from ER(unit) for routine progression of care Report consisted of patients Situation, Background, Assessment and  
Recommendations(SBAR). Information from the following report(s) SBAR was reviewed with the receiving nurse. Opportunity for questions and clarification was provided. Assessment completed upon patients arrival to unit and care assumed.

## 2020-05-05 NOTE — PROGRESS NOTES
05/05/20 0300 Dual Skin Pressure Injury Assessment Dual Skin Pressure Injury Assessment [facility] Second Care Provider (Based on [address] [place] [address]) Leilani Dumont RN

## 2020-05-05 NOTE — PROGRESS NOTES
Progress Note Patient: Tamar Horton MRN: [id number]  SSN: [id number] YOB: 1950  Age: 78 y.o. Sex: female Admit Date: 5/4/2020 LOS: 1 day Subjective:  
F/U acute respiratory failure 77 yo hx of bronchiectasis, HTN, who presented to the ED for cc shortness of breathe. D dimer 1.05. CRP 2.8. COVID test pending. Levofloxacin also started in case of bronchiectasis component causing shortness of breathe.  
 
K 2.9. Mg 2.2. Chest x ray - No consolidation. Cardiomegaly, pulmonary vascular congestion. Current Facility-Administered Medications Medication Dose Route Frequency  amLODIPine (NORVASC) tablet 10 mg  10 mg Oral DAILY  aspirin chewable tablet 81 mg  81 mg Oral DAILY  atenoloL (TENORMIN) tablet 25 mg  25 mg Oral DAILY  losartan (COZAAR) tablet 50 mg  50 mg Oral DAILY  hydrALAZINE (APRESOLINE) 20 mg/mL injection 10 mg  10 mg IntraVENous Q6H PRN  
 furosemide (LASIX) injection 20 mg  20 mg IntraVENous DAILY  sodium chloride (NS) flush 5-40 mL  5-40 mL IntraVENous Q8H  
 sodium chloride (NS) flush 5-40 mL  5-40 mL IntraVENous PRN  
 senna-docusate (PERICOLACE) 8.6-50 mg per tablet 1 Tab  1 Tab Oral BID PRN  
 acetaminophen (TYLENOL) tablet 650 mg  650 mg Oral Q6H PRN Or  
 acetaminophen (TYLENOL) suppository 650 mg  650 mg Rectal Q6H PRN  
 levoFLOXacin (LEVAQUIN) 750 mg in D5W IVPB  750 mg IntraVENous Q24H  
 enoxaparin (LOVENOX) injection 40 mg  40 mg SubCUTAneous Q24H  
 ondansetron (ZOFRAN ODT) tablet 4 mg  4 mg Oral Q6H PRN Or  
 ondansetron (ZOFRAN) injection 4 mg  4 mg IntraVENous Q6H PRN  
 guaiFENesin-dextromethorphan (ROBITUSSIN DM) 100-10 mg/5 mL syrup 5 mL  5 mL Oral Q6H PRN  
 albuterol (PROVENTIL VENTOLIN) nebulizer solution 2.5 mg  2.5 mg Nebulization Q6H RT  
 sodium chloride 3% hypertonic nebulizer soln  4 mL Nebulization Q12H Objective:  
 
Vitals:  
 05/05/20 0200 05/05/20 0258 05/05/20 0752 05/05/20 1150 BP: 187/83 136/78 136/78 123/90 Pulse: 92 87 91 88 Resp:  18 18 18 Temp:  98.4 °F (36.9 °C) 98 °F (36.7 °C) 98.3 °F (36.8 °C) SpO2: 94% 92% 94% 97% Weight:      
Height:      
  
  
Intake and Output: 
Current Shift: No intake/output data recorded. Last three shifts: No intake/output data recorded. Physical Exam:  
General:  Alert, cooperative, no distress, appears stated age. Talkative. Eyes:  Conjunctivae/corneas clear. Ears:  Normal TMs and external ear canals both ears. Nose: Nares normal. Septum midline. Mouth/Throat: Lips, mucosa, and tongue normal.   
Neck:  no JVD. Back:   deferred Lungs:   Clear to auscultation bilaterally. Heart:  Regular rate and rhythm, S1, S2 normal  
Abdomen:   Soft, non-tender. Bowel sounds normal. Obese. Extremities: Extremities normal, atraumatic, no cyanosis or edema. Pulses: 2+ and symmetric all extremities. Skin: Skin color, texture, turgor normal. No rashes or lesions Lymph nodes: Cervical, supraclavicular, and axillary nodes normal.  
Neurologic: CNII-XII intact. Normal strength, sensation and reflexes throughout. Lab/Data Review: 
 
Recent Results (from the past 24 hour(s)) CBC WITH AUTOMATED DIFF Collection Time: 05/04/20  5:04 PM  
Result Value Ref Range WBC 8.5 4.3 - 11.1 K/uL  
 RBC 4.45 4.05 - 5.2 M/uL  
 HGB 11.5 (L) 11.7 - 15.4 g/dL HCT 37.5 35.8 - 46.3 % MCV 84.3 79.6 - 97.8 FL  
 MCH 25.8 (L) 26.1 - 32.9 PG  
 MCHC 30.7 (L) 31.4 - 35.0 g/dL  
 RDW 18.3 (H) 11.9 - 14.6 % PLATELET 902 480 - 608 K/uL MPV 11.0 9.4 - 12.3 FL ABSOLUTE NRBC 0.00 0.0 - 0.2 K/uL  
 DF AUTOMATED NEUTROPHILS 79 (H) 43 - 78 % LYMPHOCYTES 11 (L) 13 - 44 % MONOCYTES 8 4.0 - 12.0 % EOSINOPHILS 1 0.5 - 7.8 % BASOPHILS 0 0.0 - 2.0 % IMMATURE GRANULOCYTES 1 0.0 - 5.0 %  
 ABS. NEUTROPHILS 6.7 1.7 - 8.2 K/UL  
 ABS. LYMPHOCYTES 0.9 0.5 - 4.6 K/UL  
 ABS. MONOCYTES 0.7 0.1 - 1.3 K/UL ABS. EOSINOPHILS 0.1 0.0 - 0.8 K/UL  
 ABS. BASOPHILS 0.0 0.0 - 0.2 K/UL  
 ABS. IMM. GRANS. 0.0 0.0 - 0.5 K/UL METABOLIC PANEL, COMPREHENSIVE Collection Time: 05/04/20  5:04 PM  
Result Value Ref Range Sodium 140 136 - 145 mmol/L Potassium 4.1 3.5 - 5.1 mmol/L Chloride 104 98 - 107 mmol/L  
 CO2 31 21 - 32 mmol/L Anion gap 5 (L) 7 - 16 mmol/L Glucose 90 65 - 100 mg/dL BUN 13 8 - 23 MG/DL Creatinine 0.84 0.6 - 1.0 MG/DL  
 GFR est AA >60 >60 ml/min/1.73m2 GFR est non-AA >60 >60 ml/min/1.73m2 Calcium 8.4 8.3 - 10.4 MG/DL Bilirubin, total 1.2 (H) 0.2 - 1.1 MG/DL  
 ALT (SGPT) 20 12 - 65 U/L  
 AST (SGOT) 50 (H) 15 - 37 U/L Alk. phosphatase 70 50 - 136 U/L Protein, total 6.7 6.3 - 8.2 g/dL Albumin 3.2 3.2 - 4.6 g/dL Globulin 3.5 2.3 - 3.5 g/dL A-G Ratio 0.9 (L) 1.2 - 3.5 NT-PRO BNP Collection Time: 05/04/20  5:04 PM  
Result Value Ref Range NT pro-BNP 2,307 (H) 5 - 125 PG/ML  
TROPONIN I Collection Time: 05/04/20  5:04 PM  
Result Value Ref Range Troponin-I, Qt. <0.02 (L) 0.02 - 0.05 NG/ML  
EKG, 12 LEAD, INITIAL Collection Time: 05/04/20  6:17 PM  
Result Value Ref Range Ventricular Rate 89 BPM  
 Atrial Rate 357 BPM  
 QRS Duration 78 ms Q-T Interval 334 ms QTC Calculation (Bezet) 406 ms Calculated R Axis 23 degrees Calculated T Axis 46 degrees Diagnosis Atrial fibrillation with premature ventricular or aberrantly conducted  
complexes Cannot rule out Anterior infarct , age undetermined Abnormal ECG No previous ECGs available Confirmed by Cristina Santos ([id number]) on 5/4/2020 29:57:71 PM 
  
METABOLIC PANEL, BASIC Collection Time: 05/05/20  6:24 AM  
Result Value Ref Range Sodium 142 136 - 145 mmol/L Potassium 2.9 (LL) 3.5 - 5.1 mmol/L Chloride 104 98 - 107 mmol/L  
 CO2 35 (H) 21 - 32 mmol/L Anion gap 3 (L) 7 - 16 mmol/L Glucose 108 (H) 65 - 100 mg/dL  BUN 12 8 - 23 MG/DL  
 Creatinine 0.75 0.6 - 1.0 MG/DL  
 GFR est AA >60 >60 ml/min/1.73m2 GFR est non-AA >60 >60 ml/min/1.73m2 Calcium 8.3 8.3 - 10.4 MG/DL PROCALCITONIN Collection Time: 05/05/20  6:24 AM  
Result Value Ref Range Procalcitonin 0.05 ng/mL C REACTIVE PROTEIN, QT Collection Time: 05/05/20  6:24 AM  
Result Value Ref Range C-Reactive protein 2.8 (H) 0.0 - 0.9 mg/dL CBC W/O DIFF Collection Time: 05/05/20  6:24 AM  
Result Value Ref Range WBC 8.7 4.3 - 11.1 K/uL  
 RBC 4.54 4.05 - 5.2 M/uL  
 HGB 11.8 11.7 - 15.4 g/dL HCT 39.2 35.8 - 46.3 % MCV 86.3 79.6 - 97.8 FL  
 MCH 26.0 (L) 26.1 - 32.9 PG  
 MCHC 30.1 (L) 31.4 - 35.0 g/dL  
 RDW 18.0 (H) 11.9 - 14.6 % PLATELET 832 636 - 143 K/uL MPV 9.8 9.4 - 12.3 FL ABSOLUTE NRBC 0.00 0.0 - 0.2 K/uL LD Collection Time: 05/05/20  6:24 AM  
Result Value Ref Range  110 - 210 U/L FERRITIN Collection Time: 05/05/20  6:24 AM  
Result Value Ref Range Ferritin 77 8 - 388 NG/ML  
D DIMER Collection Time: 05/05/20  6:24 AM  
Result Value Ref Range D DIMER 1.05 (HH) <0.56 ug/ml(FEU) TROPONIN I Collection Time: 05/05/20  6:24 AM  
Result Value Ref Range Troponin-I, Qt. <0.02 (L) 0.02 - 0.05 NG/ML  
LACTIC ACID Collection Time: 05/05/20  6:24 AM  
Result Value Ref Range Lactic acid 0.7 0.4 - 2.0 MMOL/L  
MAGNESIUM Collection Time: 05/05/20  6:24 AM  
Result Value Ref Range Magnesium 2.2 1.8 - 2.4 mg/dL GLUCOSE, POC Collection Time: 05/05/20  7:48 AM  
Result Value Ref Range Glucose (POC) 110 (H) 65 - 100 mg/dL Assessment/ Plan:  
 
Principal Problem: Suspected Covid-19 Virus Infection (5/5/2020) Active Problems: 
  Bronchiectasis ([address] (5/5/2020) Hypertension (5/5/2020) Acute respiratory failure - Unsure if secondary to COVID infection or bronchiectasis. Order hypertonic saline and albuterol. Levofloxacin. Order respiratory culture. HTN- amlodipine 10mg daily, atenolol 25mg daily. Lasix 20mg daily. Losartan 50mg daily. DVT prophylaxis - Lovenox Signed By: Tyra Mcconnell DO May 5, 2020

## 2020-05-05 NOTE — PROGRESS NOTES
MSN, CM:  Spoke with patient's  via phone this AM about discharge planning. Patient lives with  Vi Dumont" in own house with 18 steps for entrance. [place] states they live upstairs and they enter home via back steps. He also states that downstairs is the [employer] for their detention. [place] states he is a smoker and smokes in the house which he has done for years. He also states the house is kept dirty r/t patient is not a . They have one medium size dog that stays inside the home. Patient is independent with all ADL's and requires no equipment for ambulation.  confirms PCP is Dr. Maribel Bryant and she drives herself to all appointments. Patient is currently on 4L NC but does not require O2 at home. ACP completed. PT and OT consulted for evaluation and recommendations. Case Management will continue to follow for discharge needs. Care Management Interventions PCP Verified by CM: Yes(Dr. LIANG) Mode of Transport at Discharge: Other (see comment)(family to transport) Transition of Care Consult (CM Consult): Discharge Planning Physical Therapy Consult: Yes Occupational Therapy Consult: Yes Current Support Network: Own Home, Lives with Spouse Confirm Follow Up Transport: Self Freedom of Choice List was Provided with Basic Dialogue that Supports the Patient's Individualized Plan of Care/Goals, Treatment Preferences and Shares the Quality Data Associated with the Providers?: Yes Discharge Location Discharge Placement: Home

## 2020-05-05 NOTE — H&P
Hospitalist Note Admit Date:  2020  5:01 PM  
Name:  Mariel Brown Age:  78 y.o. 
:  1950 MRN:  [id number] PCP:  Janette, MD Gali 
Treatment Team: Attending Provider: Lalitha Patel DO; Primary Nurse: Carole Puente RN 
 
HPI/Subjective:  
 
Mariel Brown is a 78 y.o. female with a history of CHF and bronchiectasis presenting with shortness of breath. Patient complains of shortness of breath for 4 weeks which was previously worsening but is now stable/somewhat improved. She complains of coughing with production of clear white sputum. She denies any hemoptysis or purulent expectoration. She denies chest pain, abdominal pain, headache, lightheadedness, dizziness, nausea, vomiting, diarrhea, dysuria, myalgias or arthralgias. She has not had any fevers, chills, or diaphoresis. Patient has been staying at home with no recent travel. She does endorse worsening edema and uses Lasix at home. 10 systems reviewed and negative except as noted in HPI. Past Medical History:  
Diagnosis Date  Bronchiectasis ([address]  Congestive heart failure (CHF) [address] [address]  Pneumonia Past Surgical History:  
Procedure Laterality Date  HX BACK SURGERY    
 HX HYSTERECTOMY Allergies Allergen Reactions  Pseudoephedrine Other (comments) \"gave me high blood pressure\" Social History Tobacco Use  Smoking status: Never Smoker  Smokeless tobacco: Never Used Substance Use Topics  Alcohol use: Never Frequency: Never History reviewed. No pertinent family history. There is no immunization history on file for this patient. PTA Medications: 
None Objective:  
 
Patient Vitals for the past 24 hrs: 
 Temp Pulse Resp BP SpO2  
20  86   95 % 20  87  162/85 93 % 20 1950  94  154/77   
20 1949 97.9 °F (36.6 °C) 93  154/77 91 % 20 1729  85 18  92 % 20 1728  85 24  91 % 05/04/20 1726  85 (!) 32  (!) 78 % 05/04/20 1717  94 29  92 % 05/04/20 1711  84 27  98 % 05/04/20 1659 97 °F (36.1 °C) 91 18 135/90 98 % Oxygen Therapy O2 Sat (%): 95 % (05/04/20 2017) Pulse via Oximetry: 86 beats per minute (05/04/20 2017) O2 Device: Nasal cannula (05/04/20 1949) O2 Flow Rate (L/min): 3 l/min (05/04/20 1949) Estimated body mass index is 47.55 kg/m² as calculated from the following: 
  Height as of this encounter: 5' 2\" (1.575 m). Weight as of this encounter: 117.9 kg (260 lb). No intake or output data in the 24 hours ending 05/04/20 2038 *Note that automatically entered I/Os may not be accurate; dependent on patient compliance with collection and accurate  by assistants. Physical Exam: 
General:    No acute distress. Awake. Alert. Eyes:   Normal sclerae, no icterus or injection. PERRL. Extraocular movements intact. HENT:  Normocephalic, atraumatic. Moist mucous membranes. No cervical LAD. CV:   RRR. Normal S1/S2. No m/r/g. 1+ edema Lungs:  CTAB. No wheezing, rhonchi, or rales. No increased work of breathing. Abdomen: Soft, nontender, nondistended. Extremities: Warm and dry. No cyanosis or edema. Neurologic: CN II-XII grossly intact. Sensation grossly intact. Able to move all extremities. Skin:     No rashes or jaundice. Normal coloration Psych:  Normal mood and affect. I reviewed the labs, imaging, EKGs, telemetry, and other studies done this admission. Data Review:  
Recent Results (from the past 24 hour(s)) CBC WITH AUTOMATED DIFF Collection Time: 05/04/20  5:04 PM  
Result Value Ref Range WBC 8.5 4.3 - 11.1 K/uL  
 RBC 4.45 4.05 - 5.2 M/uL  
 HGB 11.5 (L) 11.7 - 15.4 g/dL HCT 37.5 35.8 - 46.3 % MCV 84.3 79.6 - 97.8 FL  
 MCH 25.8 (L) 26.1 - 32.9 PG  
 MCHC 30.7 (L) 31.4 - 35.0 g/dL  
 RDW 18.3 (H) 11.9 - 14.6 % PLATELET 129 518 - 708 K/uL MPV 11.0 9.4 - 12.3 FL ABSOLUTE NRBC 0.00 0.0 - 0.2 K/uL DF AUTOMATED NEUTROPHILS 79 (H) 43 - 78 % LYMPHOCYTES 11 (L) 13 - 44 % MONOCYTES 8 4.0 - 12.0 % EOSINOPHILS 1 0.5 - 7.8 % BASOPHILS 0 0.0 - 2.0 % IMMATURE GRANULOCYTES 1 0.0 - 5.0 %  
 ABS. NEUTROPHILS 6.7 1.7 - 8.2 K/UL  
 ABS. LYMPHOCYTES 0.9 0.5 - 4.6 K/UL  
 ABS. MONOCYTES 0.7 0.1 - 1.3 K/UL  
 ABS. EOSINOPHILS 0.1 0.0 - 0.8 K/UL  
 ABS. BASOPHILS 0.0 0.0 - 0.2 K/UL  
 ABS. IMM. GRANS. 0.0 0.0 - 0.5 K/UL METABOLIC PANEL, COMPREHENSIVE Collection Time: 05/04/20  5:04 PM  
Result Value Ref Range Sodium 140 136 - 145 mmol/L Potassium 4.1 3.5 - 5.1 mmol/L Chloride 104 98 - 107 mmol/L  
 CO2 31 21 - 32 mmol/L Anion gap 5 (L) 7 - 16 mmol/L Glucose 90 65 - 100 mg/dL BUN 13 8 - 23 MG/DL Creatinine 0.84 0.6 - 1.0 MG/DL  
 GFR est AA >60 >60 ml/min/1.73m2 GFR est non-AA >60 >60 ml/min/1.73m2 Calcium 8.4 8.3 - 10.4 MG/DL Bilirubin, total 1.2 (H) 0.2 - 1.1 MG/DL  
 ALT (SGPT) 20 12 - 65 U/L  
 AST (SGOT) 50 (H) 15 - 37 U/L Alk. phosphatase 70 50 - 136 U/L Protein, total 6.7 6.3 - 8.2 g/dL Albumin 3.2 3.2 - 4.6 g/dL Globulin 3.5 2.3 - 3.5 g/dL A-G Ratio 0.9 (L) 1.2 - 3.5 NT-PRO BNP Collection Time: 05/04/20  5:04 PM  
Result Value Ref Range NT pro-BNP 2,307 (H) 5 - 125 PG/ML  
TROPONIN I Collection Time: 05/04/20  5:04 PM  
Result Value Ref Range Troponin-I, Qt. <0.02 (L) 0.02 - 0.05 NG/ML  
EKG, 12 LEAD, INITIAL Collection Time: 05/04/20  6:17 PM  
Result Value Ref Range Ventricular Rate 89 BPM  
 Atrial Rate 357 BPM  
 QRS Duration 78 ms Q-T Interval 334 ms QTC Calculation (Bezet) 406 ms Calculated R Axis 23 degrees Calculated T Axis 46 degrees Diagnosis    
  !! AGE AND GENDER SPECIFIC ECG ANALYSIS !! Atrial fibrillation with premature ventricular or aberrantly conducted  
complexes Cannot rule out Anterior infarct , age undetermined Abnormal ECG No previous ECGs available All Micro Results None No current facility-administered medications for this encounter. No current outpatient medications on file. Other Studies: No results found for this visit on 05/04/20. Xr Chest [facility] Result Date: 5/4/2020 Chest portable CLINICAL INDICATION: Acute severe hypoxia, respiratory distress, oxygen requirement per cannula. History of bronchiectasis, hypertension, and overall progressive dyspnea for about 4 weeks. COMPARISON: None TECHNIQUE: single AP portable view chest at 5:20 PM upright FINDINGS: The heart is enlarged. There is bilateral perihilar vascular congestion and probable minimal interstitial edema. No evidence of a pneumothorax, consolidation, or a significant pleural effusion. Right hemidiaphragm is mildly elevated. Lung volumes are overall well inflated. Note of old healed left posterior eighth rib fracture. Several external monitoring wires overlie the chest. Oxygen tubing is partially seen. IMPRESSION: 1. No consolidation. 2. Cardiomegaly, pulmonary vascular congestion. Assessment and Plan:  
 
 
Ne Paz is a 78 y.o. female presenting with viral respiratory illness, rule out COVID-19 Plan: 
Viral respiratory illness History of bronchiectasis Empiric antibiotics with Levaquin Send SARS-COV-2 testing to rule out COVID-19 Check LDH, CRP, ferritin, troponin, and d-dimer Repeat lactic acid Follow up cultures Give tylenol PRN for fever Give Zofran PRN for nausea Monitor on telemetry Continue supplemental oxygen as needed Chronic congestive heart failure, stable Continue atenolol Continue Lasix Continue amlodipine Continue losartan Continue aspirin DVT ppx ordered Code status:  Full Estimated LOS:  Greater than 2 midnights Risk:  high Signed: 
Abraham Stallings MD

## 2020-05-05 NOTE — PROGRESS NOTES
Called lab to obtain patients lab's at [phone]. Lab stated they were coming up, but has not yet come up to draw labs

## 2020-05-05 NOTE — PROGRESS NOTES
Problem: Mobility Impaired (Adult and Pediatric) Goal: *Acute Goals and Plan of Care Description: Acute PT Goals: (1.)Reji Boswell will move from supine to sit and sit to supine , scoot up and down and roll side to side with MODIFIED INDEPENDENCE within 7 treatment day(s). (2.)Reji Boswell will transfer from bed to chair and chair to bed with MODIFIED INDEPENDENCE using the least restrictive device within 7 treatment day(s). (3.)Reji Boswell will ambulate with MODIFIED INDEPENDENCE for 300 feet with the least restrictive device within 7 treatment day(s). (4.)Reji Boswell will perform standing static and dynamic balance activities x 20 minutes with MODIFIED INDEPENDENCE to improve safety and activity tolerance within 7 treatment day(s). (5.)Reji Boswell will ascend and descend 10 stairs using one hand rail(s) with MODIFIED INDEPENDENCE to improve functional mobility and safety within 7 treatment day(s). (6.)Reji Boswell will perform bilateral lower extremity exercises x 25 min for HEP with INDEPENDENCE to improve strength, endurance, and functional mobility within 7 treatment day(s). PHYSICAL THERAPY: Initial Assessment, Daily Note, and PM 5/5/2020 INPATIENT: PT Visit Days : 1 [place] / [place] / Product Type: Medicare /   
  
NAME/AGE/GENDER: Dangelo Villeda is a 78 y.o. female PRIMARY DIAGNOSIS: Pneumonia [J18.9] Suspected Covid-19 Virus Infection Suspected Covid-19 Virus Infection ICD-10: Treatment Diagnosis: 
 Generalized Muscle Weakness (M62.81) Difficulty in walking, Not elsewhere classified (R26.2) Other abnormalities of gait and mobility (R26.89) Precaution/Allergies: 
Pseudoephedrine ASSESSMENT:  
 
Ms. Shefali Jensen is a 78year old F who presents to hospital with pneumonia. Lab results pending. Prior to hospital admission pt lives with her  in a two story home with 18 step(s) to enter from basement.  Pt endorses no falls in past 6 months. Prior to admission Ms. Boswell uses no DME for mobility. She does not wear oxygen at baseline. Upon entering, pt resting in bed, on 3L O2 via nasal cannula, agreeable to PT evaluation. she reports no pain at rest. BLE assessment indicates sensation to light touch intact, AROM WFL, and strength diminished, but functional. Pt performed supine > sit with SBA/CGA, sitting EOB with good sitting balance control. Pt fatigued, cues for proper technique and activity pacing. O2 90% on 3L O2. Sit > stand with CGA using no assistive device. Pt took steps to bedside chair, CGA. O2 dropped to 88%, PT provided cues for breathing technique, O2 diane back up to >90% on 3L O2. PT provided instruction in seated BLE exercises to promote strength, mobility, and activity tolerance. Pt with good participation. At end of session pt sitting up in chair with all needs within reach, alarm activated for safety, RN notified. Pt presents as functioning below her baseline, with deficits in mobility including transfers, gait, balance, and activity tolerance. Pt will benefit from skilled therapy services to address stated deficits to promote return to highest level of function, independence, and safety. Will continue to follow. This section established at most recent assessment PROBLEM LIST (Impairments causing functional limitations): 
Decreased Strength Decreased ADL/Functional Activities Decreased Transfer Abilities Decreased Ambulation Ability/Technique Decreased Activity Tolerance Increased Fatigue Increased Shortness of Breath INTERVENTIONS PLANNED: (Benefits and precautions of physical therapy have been discussed with the patient.) Balance Exercise Bed Mobility Family Education Gait Training Home Exercise Program (HEP) Neuromuscular Re-education/Strengthening Range of Motion (ROM) Therapeutic Activites Therapeutic Exercise/Strengthening Transfer Training TREATMENT PLAN: Frequency/Duration: 5 times a week for duration of hospital stay Rehabilitation Potential For Stated Goals: Good REHAB RECOMMENDATIONS (at time of discharge pending progress):   
Placement: It is my opinion, based on this patient's performance to date, that Ms. Boswell may benefit from participating in 1-2 additional therapy sessions in order to continue to assess for rehab potential and then make recommendation for disposition at discharge. (HHPT vs STR pending progress with therapy/medical status)> Equipment:  
None at this time HISTORY:  
History of Present Injury/Illness (Reason for Referral): 
Per H&P: \" Rios Valderrama is a 78 y.o. female with a history of CHF and bronchiectasis presenting with shortness of breath. Patient complains of shortness of breath for 4 weeks which was previously worsening but is now stable/somewhat improved. She complains of coughing with production of clear white sputum. She denies any hemoptysis or purulent expectoration. She denies chest pain, abdominal pain, headache, lightheadedness, dizziness, nausea, vomiting, diarrhea, dysuria, myalgias or arthralgias. She has not had any fevers, chills, or diaphoresis. Patient has been staying at home with no recent travel. She does endorse worsening edema and uses Lasix at home. \" 
Past Medical History/Comorbidities: Ms. Ese Lance  has a past medical history of Bronchiectasis ([address] Congestive heart failure ([address]F) ([facility] [address] and Pneumonia. Ms. Ese Lance  has a past surgical history that includes hx hysterectomy and hx back surgery. Social History/Living Environment:  
Home Environment: Private residence # Steps to Enter: 18(from basement entrance - which pt uses regularly) Rails to Enter: Yes One/Two Story Residence: Two story Lift Chair Available: No 
Living Alone: No 
Support Systems: Spouse/Significant Other/Partner Patient Expects to be Discharged to[de-identified] Unknown Prior Level of Function/Work/Activity: Independent, lives in 2 story home with 18 steps. Lives with . No falls. No O2. Number of Personal Factors/Comorbidities that affect the Plan of Care: 1-2: MODERATE COMPLEXITY EXAMINATION:  
Most Recent Physical Functioning:  
Gross Assessment: 
AROM: Within functional limits Strength: Within functional limits Sensation: Intact Posture: 
  
Balance: 
Sitting: Intact Standing: Impaired Standing - Static: Fair Standing - Dynamic : Fair Bed Mobility: 
Rolling: Stand-by assistance Supine to Sit: Contact guard assistance Scooting: Contact guard assistance Wheelchair Mobility: 
  
Transfers: 
Sit to Stand: Contact guard assistance Stand to Sit: Contact guard assistance Bed to Chair: Contact guard assistance Interventions: Safety awareness training; Tactile cues; Verbal cues Gait: 
  
Base of Support: Center of gravity altered; Widened Speed/Cinda: Shuffled; Slow Step Length: Left shortened;Right shortened Gait Abnormalities: Decreased step clearance; Path deviations;Trunk sway increased; Shuffling gait Distance (ft): 8 Feet (ft) Assistive Device: (none) Ambulation - Level of Assistance: Contact guard assistance Body Structures Involved: 
Heart Lungs Bones Joints Muscles Body Functions Affected: 
Cardio Respiratory Neuromusculoskeletal 
Movement Related Activities and Participation Affected: 
General Tasks and Demands Mobility Self Care Number of elements that affect the Plan of Care: 4+: HIGH COMPLEXITY CLINICAL PRESENTATION:  
Presentation: Stable and uncomplicated: LOW COMPLEXITY CLINICAL DECISION MAKIN [facility] [address] [id number] AM-PAC 6 Clicks Basic Mobility Inpatient Short Form How much difficulty does the patient currently have. .. Unable A Lot A Little None 1. Turning over in bed (including adjusting bedclothes, sheets and blankets)? [] 1   [] 2   [x] 3   [] 4  
2.   Sitting down on and standing up from a chair with arms ( e.g., wheelchair, bedside commode, etc.)   [] 1   [] 2   [x] 3   [] 4  
3. Moving from lying on back to sitting on the side of the bed? [] 1   [] 2   [x] 3   [] 4 How much help from another person does the patient currently need. .. Total A Lot A Little None 4. Moving to and from a bed to a chair (including a wheelchair)? [] 1   [] 2   [x] 3   [] 4  
5. Need to walk in hospital room? [] 1   [] 2   [x] 3   [] 4  
6. Climbing 3-5 steps with a railing? [] 1   [] 2   [x] 3   [] 4  
© 2007, Trustees of [address] [place] [address] [address] [id number], under license to [employer]. All rights reserved Score:  Initial: 18 Most Recent: X (Date: -- ) Interpretation of Tool:  Represents activities that are increasingly more difficult (i.e. Bed mobility, Transfers, Gait). Medical Necessity:    
Patient is expected to demonstrate progress in  
strength, range of motion, balance, coordination, functional technique, and activity tolerance  
 to  
increase independence with all mobility. . 
Reason for Services/Other Comments: 
Patient continues to require skilled intervention due to  
medical complications and mobility deficits which impact her level of function, safety, and independence as indicated above. .  
Use of outcome tool(s) and clinical judgement create a POC that gives a: Clear prediction of patient's progress: LOW COMPLEXITY  
  
 
TREATMENT:  
(In addition to Assessment/Re-Assessment sessions the following treatments were rendered) Pre-treatment Symptoms/Complaints:  None Pain: Initial:  
Pain Intensity 1: 0  Post Session:  0/10 Therapeutic Activity: (   8 Minutes): Therapeutic activities including Bed transfers, Chair transfers, Ambulation on level ground, and seated BLE exercises to improve mobility, strength, balance, coordination, and activity tolerance . Required minimal   to promote static and dynamic balance in standing and promote coordination of bilateral, lower extremity(s). Date: 
5/5/20 Date: Date: Activity/Exercise Parameters Parameters Parameters Seated AP 1 x 15 BLE Seated LAQ 1 x 10 BLE Seated Marches 1 x 10 BLE    
     
     
     
     
\ 
Braces/Orthotics/Lines/Etc:  
O2 Device: Nasal cannula Treatment/Session Assessment:   
Response to Treatment:  see above. Limited activity tolerance. Interdisciplinary Collaboration:  
Physical Therapist 
Registered Nurse After treatment position/precautions:  
Up in chair Bed/Chair-wheels locked Bed in low position Call light within reach RN notified Compliance with Program/Exercises: Will assess as treatment progresses Recommendations/Intent for next treatment session: \"Next visit will focus on advancements to more challenging activities and reduction in assistance provided\". Total Treatment Duration: PT Patient Time In/Time Out Time In: 0750 Time Out: 1436 Rani Gongora PT

## 2020-05-05 NOTE — PROGRESS NOTES
Problem: Falls - Risk of 
Goal: *Absence of Falls Description: Document Earma Ajay Fall Risk and appropriate interventions in the flowsheet. Outcome: Progressing Towards Goal 
Note: Fall Risk Interventions: 
  
 
  
 
Medication Interventions: Teach patient to arise slowly Problem: Patient Education: Go to Patient Education Activity Goal: Patient/Family Education Outcome: Progressing Towards Goal 
  
Problem: Patient Education: Go to Patient Education Activity Goal: Patient/Family Education Outcome: Progressing Towards Goal 
  
Problem: Pneumonia: Day 1 Goal: Off Pathway (Use only if patient is Off Pathway) Outcome: Progressing Towards Goal 
Goal: Activity/Safety Outcome: Progressing Towards Goal 
Goal: Consults, if ordered Outcome: Progressing Towards Goal 
Goal: Diagnostic Test/Procedures Outcome: Progressing Towards Goal 
Goal: Nutrition/Diet Outcome: Progressing Towards Goal 
Goal: Medications Outcome: Progressing Towards Goal 
Goal: Respiratory Outcome: Progressing Towards Goal 
Goal: Treatments/Interventions/Procedures Outcome: Progressing Towards Goal 
Goal: Psychosocial 
Outcome: Progressing Towards Goal 
Goal: *Oxygen saturation within defined limits Outcome: Progressing Towards Goal 
Goal: *Influenza vaccine administered (October-March) Outcome: Progressing Towards Goal 
Goal: *Pneumoccocal vaccine administered Outcome: Progressing Towards Goal 
Goal: *Hemodynamically stable Outcome: Progressing Towards Goal 
Goal: *Demonstrates progressive activity Outcome: Progressing Towards Goal 
Goal: *Tolerating diet Outcome: Progressing Towards Goal 
  
Problem: Pneumonia: Day 2 Goal: Off Pathway (Use only if patient is Off Pathway) Outcome: Progressing Towards Goal 
Goal: Activity/Safety Outcome: Progressing Towards Goal 
Goal: Consults, if ordered Outcome: Progressing Towards Goal 
Goal: Diagnostic Test/Procedures Outcome: Progressing Towards Goal 
Goal: Nutrition/Diet Outcome: Progressing Towards Goal 
Goal: Discharge Planning Outcome: Progressing Towards Goal 
Goal: Medications Outcome: Progressing Towards Goal 
Goal: Respiratory Outcome: Progressing Towards Goal 
Goal: Treatments/Interventions/Procedures Outcome: Progressing Towards Goal 
Goal: Psychosocial 
Outcome: Progressing Towards Goal 
Goal: *Oxygen saturation within defined limits Outcome: Progressing Towards Goal 
Goal: *Hemodynamically stable Outcome: Progressing Towards Goal 
Goal: *Demonstrates progressive activity Outcome: Progressing Towards Goal 
Goal: *Tolerating diet Outcome: Progressing Towards Goal 
Goal: *Optimal pain control at patient's stated goal 
Outcome: Progressing Towards Goal 
  
Problem: Pneumonia: Day 3 Goal: Off Pathway (Use only if patient is Off Pathway) Outcome: Progressing Towards Goal 
Goal: Activity/Safety Outcome: Progressing Towards Goal 
Goal: Consults, if ordered Outcome: Progressing Towards Goal 
Goal: Diagnostic Test/Procedures Outcome: Progressing Towards Goal 
Goal: Nutrition/Diet Outcome: Progressing Towards Goal 
Goal: Discharge Planning Outcome: Progressing Towards Goal 
Goal: Medications Outcome: Progressing Towards Goal 
Goal: Respiratory Outcome: Progressing Towards Goal 
Goal: Treatments/Interventions/Procedures Outcome: Progressing Towards Goal 
Goal: Psychosocial 
Outcome: Progressing Towards Goal 
Goal: *Oxygen saturation within defined limits Outcome: Progressing Towards Goal 
Goal: *Hemodynamically stable Outcome: Progressing Towards Goal 
Goal: *Demonstrates progressive activity Outcome: Progressing Towards Goal 
Goal: *Tolerating diet Outcome: Progressing Towards Goal 
Goal: *Describes available resources and support systems Outcome: Progressing Towards Goal 
Goal: *Optimal pain control at patient's stated goal 
Outcome: Progressing Towards Goal 
  
Problem: Pneumonia: Day 4 Goal: Off Pathway (Use only if patient is Off Pathway) Outcome: Progressing Towards Goal 
Goal: Activity/Safety Outcome: Progressing Towards Goal 
Goal: Nutrition/Diet Outcome: Progressing Towards Goal 
Goal: Discharge Planning Outcome: Progressing Towards Goal 
Goal: Medications Outcome: Progressing Towards Goal 
Goal: Respiratory Outcome: Progressing Towards Goal 
Goal: Treatments/Interventions/Procedures Outcome: Progressing Towards Goal 
Goal: Psychosocial 
Outcome: Progressing Towards Goal 
  
Problem: Pneumonia: Discharge Outcomes Goal: *Demonstrates progressive activity Outcome: Progressing Towards Goal 
Goal: *Describes follow-up/return visits to physicians Outcome: Progressing Towards Goal 
Goal: *Tolerating diet Outcome: Progressing Towards Goal 
Goal: *Verbalizes name, dosage, time, side effects, and number of days to continue medications Outcome: Progressing Towards Goal 
Goal: *Influenza immunization Outcome: Progressing Towards Goal 
Goal: *Pneumococcal immunization Outcome: Progressing Towards Goal 
Goal: *Respiratory status at baseline Outcome: Progressing Towards Goal 
Goal: *Vital signs within defined limits Outcome: Progressing Towards Goal 
Goal: *Describes available resources and support systems Outcome: Progressing Towards Goal 
Goal: *Optimal pain control at patient's stated goal 
Outcome: Progressing Towards Goal

## 2020-05-05 NOTE — PROGRESS NOTES
Patient is resting quietly and states she is much improved after sitting up in the chair. I updated her  as to the patient's condition. He would like to speak with a doctor tomorrow if possible. I instructed him to call mid morning and we will get the doctor to give him a call. Patient is tolerating her diet and complains of no nausea or vomiting. Sputum culture still needs to be collected.

## 2020-05-06 NOTE — PROGRESS NOTES
Chart screened by SHERRY Pt had a PT consultation is HH vs STRH will require several sessions before recommendation. Case management will continue to monitor. No needs identified at this time. Please consult  if any new issues arise

## 2020-05-06 NOTE — PROGRESS NOTES
Problem: Falls - Risk of 
Goal: *Absence of Falls Description: Document Aretha Bahena Fall Risk and appropriate interventions in the flowsheet. Outcome: Progressing Towards Goal 
Note: Fall Risk Interventions: 
  
 
  
 
Medication Interventions: Teach patient to arise slowly

## 2020-05-06 NOTE — PROGRESS NOTES
Shift assessment complete. A&OX4. Lungs are diminished in the bases bilaterally. Respirations present. Even and unlabored. No s/s of distress. No c/o pain at this time. Call light within reach. Encouraged patient to call for assistance. Patient verbalizes understanding. See Doc Flowsheet for assessment details. Patient resting in bed. Will continue to monitor.

## 2020-05-06 NOTE — PROGRESS NOTES
Problem: Falls - Risk of 
Goal: *Absence of Falls Description: Document Jazmín Teresa Fall Risk and appropriate interventions in the flowsheet. Outcome: Progressing Towards Goal 
Note: Fall Risk Interventions: 
  
 
  
 
Medication Interventions: Teach patient to arise slowly

## 2020-05-06 NOTE — PROGRESS NOTES
's visit attempted via telephone call. Patient did not answer the phone.  follow-up is planned a patient is available.   
 
Srinivasa Robles MDIV, [facility]

## 2020-05-06 NOTE — PROGRESS NOTES
Progress Note Patient: Neeta Draper MRN: [id number]  SSN: [id number] YOB: 1950  Age: 78 y.o. Sex: female Admit Date: 5/4/2020 LOS: 2 days Subjective: She was found in no distress, able to speak in full sentences. She complained of chronic pedal edema, and thinks it could be related to norvasc. Current Facility-Administered Medications Medication Dose Route Frequency  [START ON 5/7/2020] levoFLOXacin (LEVAQUIN) tablet 750 mg  750 mg Oral Q24H  
 amLODIPine (NORVASC) tablet 10 mg  10 mg Oral DAILY  aspirin chewable tablet 81 mg  81 mg Oral DAILY  atenoloL (TENORMIN) tablet 25 mg  25 mg Oral DAILY  losartan (COZAAR) tablet 50 mg  50 mg Oral DAILY  hydrALAZINE (APRESOLINE) 20 mg/mL injection 10 mg  10 mg IntraVENous Q6H PRN  
 furosemide (LASIX) injection 20 mg  20 mg IntraVENous DAILY  sodium chloride (NS) flush 5-40 mL  5-40 mL IntraVENous Q8H  
 sodium chloride (NS) flush 5-40 mL  5-40 mL IntraVENous PRN  
 senna-docusate (PERICOLACE) 8.6-50 mg per tablet 1 Tab  1 Tab Oral BID PRN  
 acetaminophen (TYLENOL) tablet 650 mg  650 mg Oral Q6H PRN Or  
 acetaminophen (TYLENOL) suppository 650 mg  650 mg Rectal Q6H PRN  
 enoxaparin (LOVENOX) injection 40 mg  40 mg SubCUTAneous Q24H  
 ondansetron (ZOFRAN ODT) tablet 4 mg  4 mg Oral Q6H PRN Or  
 ondansetron (ZOFRAN) injection 4 mg  4 mg IntraVENous Q6H PRN  
 guaiFENesin-dextromethorphan (ROBITUSSIN DM) 100-10 mg/5 mL syrup 5 mL  5 mL Oral Q6H PRN  
 albuterol (PROVENTIL VENTOLIN) nebulizer solution 2.5 mg  2.5 mg Nebulization Q6H RT  
 sodium chloride 3% hypertonic nebulizer soln  4 mL Nebulization Q12H Objective:  
 
Vitals:  
 05/06/20 5498 05/06/20 0747 05/06/20 0758 05/06/20 1101 BP:  134/66  119/70 Pulse:  100  91 Resp:  18  18 Temp:  97.4 °F (36.3 °C)  97.9 °F (36.6 °C) SpO2:  91% 92% 95% Weight: 117.9 kg (259 lb 14.8 oz) Height: Intake and Output: 
Current Shift: 05/06 0701 - 05/06 1900 In: 240 [P.O.:240] Out: - Last three shifts: 05/04 1901 - 05/06 0700 In: -  
Out: 1000 [Urine:1000] Physical Exam:  
General:  Alert, cooperative, no distress, appears stated age. Eyes:  Conjunctivae/corneas clear. Ears:  Normal TMs and external ear canals both ears. Nose: Nares normal. Septum midline. Mouth/Throat: Lips, mucosa, and tongue normal.   
Neck:  no JVD. Back:   deferred Lungs:   Clear to auscultation bilaterally. On 2 liters NC Heart:  Regular rate and rhythm, S1, S2 normal  
Abdomen:   Soft, non-tender. Bowel sounds normal. Obese. Extremities: Pedal edema BL up to the knees Pulses: 2+ and symmetric all extremities. Skin: Skin color, texture, turgor normal. No rashes or lesions Lymph nodes: Cervical, supraclavicular, and axillary nodes normal.  
Neurologic: CNII-XII intact. Normal strength, sensation and reflexes throughout. Lab/Data Review: No results found for this or any previous visit (from the past 24 hour(s)). Assessment/ Plan:  
 
Principal Problem: Suspected Covid-19 Virus Infection (5/5/2020) Active Problems: 
  Bronchiectasis ([address] (5/5/2020) Hypertension (5/5/2020) -Acute respiratory failure, possible due to URI and bronchiectasis: On 3 liters NC Continue albuterol, Levofloxacin. Sputum culture 
 
-Elevated pro-BNP: 
Check echo -HTN-  atenolol 25mg daily. Lasix 20mg daily. Losartan 50mg daily. Will hold on norvasc due to pedal edema DVT prophylaxis - Lovenox Code status: full Disposition: home within 1-2 days Signed By: Gela Gallo MD   
 May 6, 2020

## 2020-05-06 NOTE — PROGRESS NOTES
Problem: Mobility Impaired (Adult and Pediatric) Goal: *Acute Goals and Plan of Care Description: Acute PT Goals: (1.)Reji Boswell will move from supine to sit and sit to supine , scoot up and down and roll side to side with MODIFIED INDEPENDENCE within 7 treatment day(s). (2.)Reji Boswell will transfer from bed to chair and chair to bed with MODIFIED INDEPENDENCE using the least restrictive device within 7 treatment day(s). (3.)Reji Boswell will ambulate with MODIFIED INDEPENDENCE for 300 feet with the least restrictive device within 7 treatment day(s). (4.)Reji Boswell will perform standing static and dynamic balance activities x 20 minutes with MODIFIED INDEPENDENCE to improve safety and activity tolerance within 7 treatment day(s). (5.)Reji Boswell will ascend and descend 10 stairs using one hand rail(s) with MODIFIED INDEPENDENCE to improve functional mobility and safety within 7 treatment day(s). (6.)Reji Boswell will perform bilateral lower extremity exercises x 25 min for HEP with INDEPENDENCE to improve strength, endurance, and functional mobility within 7 treatment day(s). PHYSICAL THERAPY: Daily Note and AM 5/6/2020 INPATIENT: PT Visit Days : 2 [place] / [place] / Product Type: Medicare /   
  
NAME/AGE/GENDER: Stephanie Siddiqi is a 78 y.o. female PRIMARY DIAGNOSIS: Pneumonia [J18.9] Suspected Covid-19 Virus Infection Suspected Covid-19 Virus Infection ICD-10: Treatment Diagnosis: 
 · Generalized Muscle Weakness (M62.81) · Difficulty in walking, Not elsewhere classified (R26.2) · Other abnormalities of gait and mobility (R26.89) Precaution/Allergies: 
Pseudoephedrine ASSESSMENT:  
 
Ms. Liu Nunes is a 78year old F who presents to hospital with pneumonia. Lab results pending. Prior to hospital admission pt lives with her  in a two story home with 18 step(s) to enter from basement.  Pt endorses no falls in past 6 months. Prior to admission Ms. Boswell uses no DME for mobility. She does not wear oxygen at baseline. 05/06-Upon entering, pt seated in chair, on 2L O2 via nasal cannula, agreeable to PT.  she reports no pain at rest. O2 removed for trial without supplemental O2. Pt perform sit<-->Stand with CGA and cues for weight shift. Pt able to amb 80' without DME before some fatigue/SOB. Pt desat from 95% to 86% gradually after 80'. Pt in chair with cues/encouragement for pursed lip breathing. Unable to resat without supplemental O2. 2L returned and pt back to 95%. Amb [de-identified]' on Room Air again and same desat. Pt ambulation has grossly improved, still needed cues for step clearance. Pt with good participation. At end of session pt sitting up in chair with all needs within reach, RN notified. Will continue therapy efforts for mobility and activity tolerance. This section established at most recent assessment PROBLEM LIST (Impairments causing functional limitations): 1. Decreased Strength 2. Decreased ADL/Functional Activities 3. Decreased Transfer Abilities 4. Decreased Ambulation Ability/Technique 5. Decreased Activity Tolerance 6. Increased Fatigue 7. Increased Shortness of Breath INTERVENTIONS PLANNED: (Benefits and precautions of physical therapy have been discussed with the patient.) 1. Balance Exercise 2. Bed Mobility 3. Family Education 4. Gait Training 5. Home Exercise Program (HEP) 6. Neuromuscular Re-education/Strengthening 7. Range of Motion (ROM) 8. Therapeutic Activites 9. Therapeutic Exercise/Strengthening 10. Transfer Training TREATMENT PLAN: Frequency/Duration: 5 times a week for duration of hospital stay Rehabilitation Potential For Stated Goals: Good REHAB RECOMMENDATIONS (at time of discharge pending progress):   
Placement:  
It is my opinion, based on this patient's performance to date, that Ms. Elbert may benefit from participating in 1-2 additional therapy sessions in order to continue to assess for rehab potential and then make recommendation for disposition at discharge. (HHPT vs STR pending progress with therapy/medical status)> Equipment:  
? None at this time HISTORY:  
History of Present Injury/Illness (Reason for Referral): 
Per H&P: \" Lisa Puentes is a 78 y.o. female with a history of CHF and bronchiectasis presenting with shortness of breath. Patient complains of shortness of breath for 4 weeks which was previously worsening but is now stable/somewhat improved. She complains of coughing with production of clear white sputum. She denies any hemoptysis or purulent expectoration. She denies chest pain, abdominal pain, headache, lightheadedness, dizziness, nausea, vomiting, diarrhea, dysuria, myalgias or arthralgias. She has not had any fevers, chills, or diaphoresis. Patient has been staying at home with no recent travel. She does endorse worsening edema and uses Lasix at home. \" 
Past Medical History/Comorbidities: Ms. Stef Jordan  has a past medical history of Bronchiectasis ([address] Congestive heart failure (CHF) ([address] and Pneumonia. Ms. Stef Jordan  has a past surgical history that includes hx hysterectomy and hx back surgery. Social History/Living Environment:  
Home Environment: Private residence # Steps to Enter: 18(from basement entrance - which pt uses regularly) Rails to Enter: Yes One/Two Story Residence: Two story Lift Chair Available: No 
Living Alone: No 
Support Systems: Spouse/Significant Other/Partner Patient Expects to be Discharged to[de-identified] Unknown Prior Level of Function/Work/Activity: 
Independent, lives in 2 story home with 18 steps. Lives with . No falls. No O2. Number of Personal Factors/Comorbidities that affect the Plan of Care: 1-2: MODERATE COMPLEXITY EXAMINATION:  
Most Recent Physical Functioning:  
Gross Assessment: 
  
         
  
Posture: Balance: 
Sitting: Intact Standing: Impaired Standing - Static: Good Standing - Dynamic : Good Bed Mobility: 
  
Wheelchair Mobility: 
  
Transfers: 
Sit to Stand: Stand-by assistance Stand to Sit: Supervision Bed to Chair: Stand-by assistance Interventions: Safety awareness training;Weight shifting training/Pressure relief;Verbal cues Gait: 
  
Base of Support: Center of gravity altered; Widened Speed/Cinda: Slow Gait Abnormalities: Decreased step clearance Distance (ft): 80 Feet (ft)(x2) 
Assistive Device: (none) Ambulation - Level of Assistance: Stand-by assistance Body Structures Involved: 1. Heart 2. Lungs 3. Bones 4. Joints 5. Muscles Body Functions Affected: 1. Cardio 2. Respiratory 3. Neuromusculoskeletal 
4. Movement Related Activities and Participation Affected: 1. General Tasks and Demands 2. Mobility 3. Self Care Number of elements that affect the Plan of Care: 4+: HIGH COMPLEXITY CLINICAL PRESENTATION:  
Presentation: Stable and uncomplicated: LOW COMPLEXITY CLINICAL DECISION MAKIN [facility] [id number] AM-PAC 6 Clicks Basic Mobility Inpatient Short Form How much difficulty does the patient currently have. .. Unable A Lot A Little None 1. Turning over in bed (including adjusting bedclothes, sheets and blankets)? [] 1   [] 2   [x] 3   [] 4  
2. Sitting down on and standing up from a chair with arms ( e.g., wheelchair, bedside commode, etc.)   [] 1   [] 2   [x] 3   [] 4  
3. Moving from lying on back to sitting on the side of the bed? [] 1   [] 2   [x] 3   [] 4 How much help from another person does the patient currently need. .. Total A Lot A Little None 4. Moving to and from a bed to a chair (including a wheelchair)? [] 1   [] 2   [x] 3   [] 4  
5. Need to walk in hospital room? [] 1   [] 2   [x] 3   [] 4  
6. Climbing 3-5 steps with a railing?    [] 1   [] 2   [x] 3   [] 4  
 © 2007, Trustees of [address] [place] [address] [address] [address], under license to [employer]. All rights reserved Score:  Initial: 18 Most Recent: X (Date: -- ) Interpretation of Tool:  Represents activities that are increasingly more difficult (i.e. Bed mobility, Transfers, Gait). Medical Necessity:    
· Patient is expected to demonstrate progress in  
· strength, range of motion, balance, coordination, functional technique, and activity tolerance ·  to  
· increase independence with all mobility. · . Reason for Services/Other Comments: 
· Patient continues to require skilled intervention due to · medical complications and mobility deficits which impact her level of function, safety, and independence as indicated above. · . Use of outcome tool(s) and clinical judgement create a POC that gives a: Clear prediction of patient's progress: LOW COMPLEXITY  
  
 
TREATMENT:  
  
Pre-treatment Symptoms/Complaints:  None at this time Pain: Initial:  
Pain Intensity 1: 0  Post Session:  0/10 Therapeutic Activity: (   25 Minutes): Therapeutic activities including Bed transfers, Chair transfers, Ambulation on level ground, and seated BLE exercises to improve mobility, strength, balance, coordination, and activity tolerance . Required minimal safety awareness, verbal and visual cues to promote static and dynamic balance in standing and promote coordination of bilateral, lower extremity(s). Date: 
5/5/20 Date: 
 Date: Activity/Exercise Parameters Parameters Parameters Seated AP 1 x 15 BLE Seated LAQ 1 x 10 BLE Seated Marches 1 x 10 BLE    
     
     
     
     
\ 
Braces/Orthotics/Lines/Etc:  
· O2 Device: Nasal cannula Treatment/Session Assessment:   
· Response to Treatment:  see above. Limited activity tolerance. · Interdisciplinary Collaboration:  
o Physical Therapist 
o Registered Nurse · After treatment position/precautions:  
o Up in chair 
o Bed/Chair-wheels locked 
o Bed in low position o Call light within reach 
o RN notified · Compliance with Program/Exercises: Will assess as treatment progresses · Recommendations/Intent for next treatment session: \"Next visit will focus on advancements to more challenging activities and reduction in assistance provided\". Total Treatment Duration: PT Patient Time In/Time Out Time In: 3047 Time Out: 1040 Severo Barley, PT, DPT

## 2020-05-07 NOTE — PROGRESS NOTES
Pt sitting up in chair. Pt alert oriented times 3 at this time. Pt denies pain or distress at this time. Pt on 2 1/2  NC at this time. Pt complaints of not sleeping last night. Pt encouraged to call for assistance if needed call light in reach, will monitor.

## 2020-05-07 NOTE — PROGRESS NOTES
Shift assessment complete. A&OX4. Lungs diminished in the bases. Respirations present. Even and unlabored. No s/s of distress. Zero c/o pain at this time. Call light within reach. Encouraged patient to call for assistance. Patient verbalizes understanding. See Doc Flowsheet for assessment details. Patient resting in bed. Will continue to monitor.

## 2020-05-07 NOTE — ROUTINE PROCESS
Discharge instructions, quarantine information, follow up information, medication list, and prescriptions provided and explained to the pt. IV removed from right upper arm, remote telemetry removed. Patient unable to e-sign computer due to strict isolation protocols. Opportunity for questions provided. Patient states spouse is coming to transport patient home. Instructed to call once ready to leave.

## 2020-05-07 NOTE — PROGRESS NOTES
Problem: Falls - Risk of 
Goal: *Absence of Falls Description: Document Pedro Sol Fall Risk and appropriate interventions in the flowsheet. Outcome: Progressing Towards Goal 
Note: Fall Risk Interventions: 
  
 
  
 
Medication Interventions: Teach patient to arise slowly

## 2020-05-07 NOTE — PROGRESS NOTES
Oxygen Qualifier Room air:  
Resting SpO2  95% Ambulating SpO2  92% Completed by: 
 
Halley Parks, RT

## 2020-05-07 NOTE — PROGRESS NOTES
Problem: Mobility Impaired (Adult and Pediatric) Goal: *Acute Goals and Plan of Care Description: Acute PT Goals: (1.)Reji Boswell will move from supine to sit and sit to supine , scoot up and down and roll side to side with MODIFIED INDEPENDENCE within 7 treatment day(s). (2.)Reji Boswell will transfer from bed to chair and chair to bed with MODIFIED INDEPENDENCE using the least restrictive device within 7 treatment day(s). (3.)Reji Boswell will ambulate with MODIFIED INDEPENDENCE for 300 feet with the least restrictive device within 7 treatment day(s). (4.)Reji Boswell will perform standing static and dynamic balance activities x 20 minutes with MODIFIED INDEPENDENCE to improve safety and activity tolerance within 7 treatment day(s). (5.)Reji Boswell will ascend and descend 10 stairs using one hand rail(s) with MODIFIED INDEPENDENCE to improve functional mobility and safety within 7 treatment day(s). (6.)Reji Boswell will perform bilateral lower extremity exercises x 25 min for HEP with INDEPENDENCE to improve strength, endurance, and functional mobility within 7 treatment day(s). PHYSICAL THERAPY: Daily Note and AM 5/7/2020 INPATIENT: PT Visit Days : 3 [place] / [place] / Product Type: Medicare /   
  
NAME/AGE/GENDER: Ressie Duane is a 78 y.o. female PRIMARY DIAGNOSIS: Pneumonia [J18.9] Suspected Covid-19 Virus Infection Suspected Covid-19 Virus Infection ICD-10: Treatment Diagnosis: 
 · Generalized Muscle Weakness (M62.81) · Difficulty in walking, Not elsewhere classified (R26.2) · Other abnormalities of gait and mobility (R26.89) Precaution/Allergies: 
Pseudoephedrine ASSESSMENT:  
 
Ms. Marks is a 78year old F who presents to hospital with pneumonia. Lab results pending. Prior to hospital admission pt lives with her  in a two story home with 18 step(s) to enter from basement.  Pt endorses no falls in past 6 months. Prior to admission Ms. Boswell uses no DME for mobility. She does not wear oxygen at baseline. 05/06-Upon entering, pt seated in chair, on 2L O2 via nasal cannula, agreeable to PT.  she reports no pain at rest. O2 removed for trial without supplemental O2. Pt perform sit<-->Stand with Supervision and cues for weight shift. Pt able to amb 160' without DME before some fatigue/SOB. Pt desat from 95% to 85% gradually after 160'. Pt in chair with cues/encouragement for pursed lip breathing. Able to resat without supplemental O2. 2L returned and pt back to 95%. Amb 160' on Room Air again and same desat. Pt ambulation has grossly improved, still needed cues for step clearance. Pt with good participation. At end of session pt sitting up in chair with all needs within reach, RN notified. Will continue therapy efforts for mobility and activity tolerance. This section established at most recent assessment PROBLEM LIST (Impairments causing functional limitations): 1. Decreased Strength 2. Decreased ADL/Functional Activities 3. Decreased Transfer Abilities 4. Decreased Ambulation Ability/Technique 5. Decreased Activity Tolerance 6. Increased Fatigue 7. Increased Shortness of Breath INTERVENTIONS PLANNED: (Benefits and precautions of physical therapy have been discussed with the patient.) 1. Balance Exercise 2. Bed Mobility 3. Family Education 4. Gait Training 5. Home Exercise Program (HEP) 6. Neuromuscular Re-education/Strengthening 7. Range of Motion (ROM) 8. Therapeutic Activites 9. Therapeutic Exercise/Strengthening 10. Transfer Training TREATMENT PLAN: Frequency/Duration: 5 times a week for duration of hospital stay Rehabilitation Potential For Stated Goals: Good REHAB RECOMMENDATIONS (at time of discharge pending progress):   
Placement:  
It is my opinion, based on this patient's performance to date, that Ms. Elbert may benefit from [address] after discharge due to the functional deficits listed above that are likely to improve with skilled rehabilitation because he/she has multiple medical issues that affect his/her functional mobility in the community. Equipment:  
? Walkers, Type: Rollator HISTORY:  
History of Present Injury/Illness (Reason for Referral): 
Per H&P: \" Lukas Maza is a 78 y.o. female with a history of CHF and bronchiectasis presenting with shortness of breath. Patient complains of shortness of breath for 4 weeks which was previously worsening but is now stable/somewhat improved. She complains of coughing with production of clear white sputum. She denies any hemoptysis or purulent expectoration. She denies chest pain, abdominal pain, headache, lightheadedness, dizziness, nausea, vomiting, diarrhea, dysuria, myalgias or arthralgias. She has not had any fevers, chills, or diaphoresis. Patient has been staying at home with no recent travel. She does endorse worsening edema and uses Lasix at home. \" 
Past Medical History/Comorbidities: Ms. Garfield Powers  has a past medical history of Bronchiectasis ([address] Congestive heart failure (CHF) ([facility] [address] and Pneumonia. Ms. Garfield Powers  has a past surgical history that includes hx hysterectomy and hx back surgery. Social History/Living Environment:  
Home Environment: Private residence # Steps to Enter: 18(from basement entrance - which pt uses regularly) Rails to Enter: Yes One/Two Story Residence: Two story # of Interior Steps: 8 Lift Chair Available: No 
Living Alone: No 
Support Systems: Spouse/Significant Other/Partner Patient Expects to be Discharged to[de-identified] Private residence Current DME Used/Available at Home: None Prior Level of Function/Work/Activity: 
Independent, lives in 2 story home with 18 steps. Lives with . No falls. No O2.   
Number of Personal Factors/Comorbidities that affect the Plan of Care: 1-2: MODERATE COMPLEXITY EXAMINATION:  
Most Recent Physical Functioning:  
Gross Assessment: 
  
         
  
Posture: 
  
Balance: 
Sitting: Intact Standing: Intact Bed Mobility: 
  
Wheelchair Mobility: 
  
Transfers: 
Sit to Stand: Supervision Stand to Sit: Supervision Bed to Chair: Supervision Interventions: Verbal cues Gait: 
  
Base of Support: Widened;Center of gravity altered Speed/Cinda: Slow Gait Abnormalities: Decreased step clearance Distance (ft): 160 Feet (ft)(x2) Ambulation - Level of Assistance: Supervision Body Structures Involved: 1. Heart 2. Lungs 3. Bones 4. Joints 5. Muscles Body Functions Affected: 1. Cardio 2. Respiratory 3. Neuromusculoskeletal 
4. Movement Related Activities and Participation Affected: 1. General Tasks and Demands 2. Mobility 3. Self Care Number of elements that affect the Plan of Care: 4+: HIGH COMPLEXITY CLINICAL PRESENTATION:  
Presentation: Stable and uncomplicated: LOW COMPLEXITY CLINICAL DECISION MAKING:  
[facility] AM-PAC 6 Clicks Basic Mobility Inpatient Short Form How much difficulty does the patient currently have. .. Unable A Lot A Little None 1. Turning over in bed (including adjusting bedclothes, sheets and blankets)? [] 1   [] 2   [x] 3   [] 4  
2. Sitting down on and standing up from a chair with arms ( e.g., wheelchair, bedside commode, etc.)   [] 1   [] 2   [x] 3   [] 4  
3. Moving from lying on back to sitting on the side of the bed? [] 1   [] 2   [x] 3   [] 4 How much help from another person does the patient currently need. .. Total A Lot A Little None 4. Moving to and from a bed to a chair (including a wheelchair)? [] 1   [] 2   [x] 3   [] 4  
5. Need to walk in hospital room? [] 1   [] 2   [x] 3   [] 4  
6. Climbing 3-5 steps with a railing? [] 1   [] 2   [x] 3   [] 4  
© 2007, Trustees of [facility], under license to [employer]. All rights reserved Score:  Initial: 18 Most Recent: X (Date: -- ) Interpretation of Tool:  Represents activities that are increasingly more difficult (i.e. Bed mobility, Transfers, Gait). Medical Necessity:    
· Patient is expected to demonstrate progress in  
· strength, range of motion, balance, coordination, functional technique, and activity tolerance ·  to  
· increase independence with all mobility. · . Reason for Services/Other Comments: 
· Patient continues to require skilled intervention due to · medical complications and mobility deficits which impact her level of function, safety, and independence as indicated above. · . Use of outcome tool(s) and clinical judgement create a POC that gives a: Clear prediction of patient's progress: LOW COMPLEXITY  
  
 
TREATMENT:  
  
Pre-treatment Symptoms/Complaints:  None at this time Pain: Initial:  
Pain Intensity 1: 0  Post Session:  0/10 Therapeutic Activity: (   30 Minutes): Therapeutic activities including Bed transfers, Chair transfers, Ambulation on level ground, and seated BLE exercises to improve mobility, strength, balance, coordination, and activity tolerance . Required minimal safety awareness, verbal and visual cues to promote static and dynamic balance in standing and promote coordination of bilateral, lower extremity(s). Date: 
5/5/20 Date: 
 Date: Activity/Exercise Parameters Parameters Parameters Seated AP 1 x 15 BLE Seated LAQ 1 x 10 BLE Seated Marches 1 x 10 BLE    
     
     
     
     
\ 
Braces/Orthotics/Lines/Etc:  
· O2 Device: Nasal cannula Treatment/Session Assessment:   
· Response to Treatment:  see above. Limited activity tolerance. Desat after 160ft. · Interdisciplinary Collaboration:  
o Physical Therapist 
o Registered Nurse · After treatment position/precautions:  
o Up in chair 
o Bed/Chair-wheels locked 
o Bed in low position 
o Call light within reach 
o RN notified · Compliance with Program/Exercises: Will assess as treatment progresses · Recommendations/Intent for next treatment session: \"Next visit will focus on advancements to more challenging activities and reduction in assistance provided\". Total Treatment Duration: PT Patient Time In/Time Out Time In: 1020 Time Out: 1050 Anya Burris, PT, DPT

## 2020-05-07 NOTE — PROGRESS NOTES
Pt is for discharge home today with no needs/supportive care orders received for CM at this time. Pt will have close follow up with PCP Care Management Interventions PCP Verified by CM: Yes Mode of Transport at Discharge: Self Transition of Care Consult (CM Consult): Discharge Planning Discharge Durable Medical Equipment: No 
Physical Therapy Consult: Yes Occupational Therapy Consult: Yes Speech Therapy Consult: No 
Current Support Network: Own Home, Family Lives [place] Confirm Follow Up Transport: Family The Plan for Transition of Care is Related to the Following Treatment Goals : no needs The Patient and/or Patient Representative was Provided with a Choice of Provider and Agrees with the Discharge Plan?: Yes Name of the Patient Representative Who was Provided with a Choice of Provider and Agrees with the Discharge Plan: pt 
Freedom of Choice List was Provided with Basic Dialogue that Supports the Patient's Individualized Plan of Care/Goals, Treatment Preferences and Shares the Quality Data Associated with the Providers?: Yes Discharge Location Discharge Placement: Home

## 2020-05-07 NOTE — DISCHARGE INSTRUCTIONS
Advance Care Planning  People with COVID-19 may have no symptoms, mild symptoms, such as fever, cough, and shortness of breath or they may have more severe illness, developing severe and fatal pneumonia. As a result, Advance Care Planning with attention to naming a health care decision maker (someone you trust to make healthcare decisions for you if you could not speak for yourself) and sharing other health care preferences is important BEFORE a possible health crisis. Please contact your Primary Care Provider to discuss Advance Care Planning. Preventing the Spread of Coronavirus Disease 2019 in Homes and Residential Communities  For the most recent information go to Face.com.fi    Prevention steps for People with confirmed or suspected COVID-19 (including persons under investigation) who do not need to be hospitalized  and   People with confirmed COVID-19 who were hospitalized and determined to be medically stable to go home    Your healthcare provider and public health staff will evaluate whether you can be cared for at home. If it is determined that you do not need to be hospitalized and can be isolated at home, you will be monitored by staff from your local or state health department. You should follow the prevention steps below until a healthcare provider or local or state health department says you can return to your normal activities. Stay home except to get medical care  People who are mildly ill with COVID-19 are able to isolate at home during their illness. You should restrict activities outside your home, except for getting medical care. Do not go to work, school, or public areas. Avoid using public transportation, ride-sharing, or taxis. Separate yourself from other people and animals in your home  People: As much as possible, you should stay in a specific room and away from other people in your home.  Also, you should use a separate bathroom, if available. Animals: You should restrict contact with pets and other animals while you are sick with COVID-19, just like you would around other people. Although there have not been reports of pets or other animals becoming sick with COVID-19, it is still recommended that people sick with COVID-19 limit contact with animals until more information is known about the virus. When possible, have another member of your household care for your animals while you are sick. If you are sick with COVID-19, avoid contact with your pet, including petting, snuggling, being kissed or licked, and sharing food. If you must care for your pet or be around animals while you are sick, wash your hands before and after you interact with pets and wear a facemask. Call ahead before visiting your doctor  If you have a medical appointment, call the healthcare provider and tell them that you have or may have COVID-19. This will help the healthcare providers office take steps to keep other people from getting infected or exposed. Wear a facemask  You should wear a facemask when you are around other people (e.g., sharing a room or vehicle) or pets and before you enter a healthcare providers office. If you are not able to wear a facemask (for example, because it causes trouble breathing), then people who live with you should not stay in the same room with you, or they should wear a facemask if they enter your room. Cover your coughs and sneezes  Cover your mouth and nose with a tissue when you cough or sneeze. Throw used tissues in a lined trash can. Immediately wash your hands with soap and water for at least 20 seconds or, if soap and water are not available, clean your hands with an alcohol-based hand  that contains at least 60% alcohol.   Clean your hands often  Wash your hands often with soap and water for at least 20 seconds, especially after blowing your nose, coughing, or sneezing; going to the bathroom; and before eating or preparing food. If soap and water are not readily available, use an alcohol-based hand  with at least 60% alcohol, covering all surfaces of your hands and rubbing them together until they feel dry. Soap and water are the best option if hands are visibly dirty. Avoid touching your eyes, nose, and mouth with unwashed hands. Avoid sharing personal household items  You should not share dishes, drinking glasses, cups, eating utensils, towels, or bedding with other people or pets in your home. After using these items, they should be washed thoroughly with soap and water. Clean all high-touch surfaces everyday  High touch surfaces include counters, tabletops, doorknobs, bathroom fixtures, toilets, phones, keyboards, tablets, and bedside tables. Also, clean any surfaces that may have blood, stool, or body fluids on them. Use a household cleaning spray or wipe, according to the label instructions. Labels contain instructions for safe and effective use of the cleaning product including precautions you should take when applying the product, such as wearing gloves and making sure you have good ventilation during use of the product. Monitor your symptoms  Seek prompt medical attention if your illness is worsening (e.g., difficulty breathing). Before seeking care, call your healthcare provider and tell them that you have, or are being evaluated for, COVID-19. Put on a facemask before you enter the facility. These steps will help the healthcare providers office to keep other people in the office or waiting room from getting infected or exposed. Ask your healthcare provider to call the local or state health department. Persons who are placed under active monitoring or facilitated self-monitoring should follow instructions provided by their local health department or occupational health professionals, as appropriate. When working with your local health department check their available hours.   If you have a medical emergency and need to call 911, notify the dispatch personnel that you have, or are being evaluated for COVID-19. If possible, put on a facemask before emergency medical services arrive. Discontinuing home isolation  Patients with confirmed COVID-19 should remain under home isolation precautions until the risk of secondary transmission to others is thought to be low. The decision to discontinue home isolation precautions should be made on a case-by-case basis, in consultation with healthcare providers and state and local health departments. DISCHARGE SUMMARY from Nurse    PATIENT INSTRUCTIONS:    After general anesthesia or intravenous sedation, for 24 hours or while taking prescription Narcotics:  · Limit your activities  · Do not drive and operate hazardous machinery  · Do not make important personal or business decisions  · Do  not drink alcoholic beverages  · If you have not urinated within 8 hours after discharge, please contact your surgeon on call. What to do at Home:  Recommended activity: Activity as tolerated with isolation protocols as stated above    If you experience any of the following symptoms worsening cough or wheezing, shortness of breath or fatigue not relieved with rest, please follow up with MD.    *  Please give a list of your current medications to your Primary Care Provider. *  Please update this list whenever your medications are discontinued, doses are      changed, or new medications (including over-the-counter products) are added. *  Please carry medication information at all times in case of emergency situations. These are general instructions for a healthy lifestyle:    No smoking/ No tobacco products/ Avoid exposure to second hand smoke  Surgeon General's Warning:  Quitting smoking now greatly reduces serious risk to your health.     Obesity, smoking, and sedentary lifestyle greatly increases your risk for illness    A healthy diet, regular physical exercise & weight monitoring are important for maintaining a healthy lifestyle    You may be retaining fluid if you have a history of heart failure or if you experience any of the following symptoms:  Weight gain of 3 pounds or more overnight or 5 pounds in a week, increased swelling in our hands or feet or shortness of breath while lying flat in bed. Please call your doctor as soon as you notice any of these symptoms; do not wait until your next office visit. The discharge information has been reviewed with the patient. The patient verbalized understanding. Discharge medications reviewed with the patient and appropriate educational materials and side effects teaching were provided. Patient Education         Learning About Hypoxemia  What is hypoxemia? Hypoxemia means that you don't have enough oxygen in your blood. It's a result of diseases that affect your heart or lungs. These include heart failure, COPD, and pulmonary fibrosis (scarring of the lungs). Being at high altitudes can also lead to hypoxemia. What happens when you have hypoxemia? Oxygen gets into your blood through your lungs. Your blood carries the oxygen to all parts of your body. When you have too little oxygen in your blood, your body doesn't get enough of it. With too little oxygen, your heart and other parts of your body don't work very well. What are the symptoms? In addition to the symptoms of whatever is causing your hypoxemia, you may:  · Get tired quickly. · Be short of breath when you are active. · Feel like your heart is pounding or racing. · Feel weak or dizzy. · Become confused. How is hypoxemia treated? Your doctor will do tests to find out how much oxygen is in your blood. He or she will look for the cause of your hypoxemia and treat that problem. For example, if you have heart failure, you may need medicines that help your heart pump better.   · If your hypoxemia is not severe, your doctor may give you oxygen through a mask or nasal cannula (say \"Arsh\"). A cannula is a thin tube with two openings that fit just inside your nose. · If your hypoxemia is severe, you may have a breathing tube put into your windpipe. The breathing tube is attached to a machine that pushes air into your lungs. This machine is called a ventilator. · If you have a long-term problem with hypoxemia, your doctor may recommend that you use oxygen regularly. Some people need it all the time. Others need it from time to time throughout the day or overnight. Your doctor will tell you how much oxygen you need and how often to use it. Follow-up care is a key part of your treatment and safety. Be sure to make and go to all appointments, and call your doctor if you are having problems. It's also a good idea to know your test results and keep a list of the medicines you take. Where can you learn more? Go to http://barbara-richard.info/  Enter M375 in the search box to learn more about \"Learning About Hypoxemia. \"  Current as of: June 9, 2019Content Version: 12.4  © 1465-4417 [employer]. Care instructions adapted under license by [employer] (which disclaims liability or warranty for this information). If you have questions about a medical condition or this instruction, always ask your healthcare professional. [address] any warranty or liability for your use of this information.          ___________________________________________________________________________________________________________________________________

## 2020-05-07 NOTE — DISCHARGE SUMMARY
Discharge Summary Patient: [facility] MRN: [id number]  SSN: [id number] YOB: 1950  Age: 78 y.o. Sex: female Admit Date: 5/4/2020 Discharge Date: 5/7/2020 Admission Diagnoses: Pneumonia [J18.9] Discharge Diagnoses:  
Problem List as of 5/7/2020 Never Reviewed Codes Class Noted - Resolved * (Principal) Suspected Covid-19 Virus Infection ICD-10-CM: R68.89  5/5/2020 - Present Bronchiectasis ([facility] [address] ICD-10-CM: J47.9 ICD-9-CM: 494.0  5/5/2020 - Present Hypertension ICD-10-CM: I10 
ICD-9-CM: 401.9  5/5/2020 - Present Pneumonia ICD-10-CM: J18.9 ICD-9-CM: 448  1/9/6555 - Present Discharge Condition: [facility] Hospital Course: Ms. Alma Max is a 77 yo hx of bronchiectasis, HTN, Hx of bronchiectasis, who presented to the ED for shortness of breath and cough. Labs included a D dimer 1.05, CRP 2. 8. CXR with possible signs of congestion, atelectasis. The patient required supplemental oxygen ( 2 liters ), covid-19 test ordered. Levofloxacin also started in view of bronchitis + hx of bronchiectasis. CXR showed no consolidation, pneumonia was ruled out. Her clinical status improved. She has chronic pedal edema and possible norvasc could be the culprit. She denies history of chest pain on exertion, syncope, or palpitations. norvasc was stopped during her hospitalization. covid-19 test was negative. Patient notified prior discharge. Oxygen Qualifier 
  
  
  Room air:  
Resting SpO2  95% Ambulating SpO2  92%  
  
  
Physical Exam:  
General:  Alert, cooperative, no distress, appears stated age. Eyes:  Conjunctivae/corneas clear. Ears:  Normal TMs and external ear canals both ears. Nose: Nares normal. Septum midline. Mouth/Throat: Lips, mucosa, and tongue normal.   
Neck:  no JVD. Back:   deferred Lungs:   Clear to auscultation bilaterally. On 2 liters NC Heart:  Regular rate and rhythm, S1, S2 normal  
 Abdomen:   Soft, non-tender. Bowel sounds normal. Obese. Extremities: Pedal edema BL up to the knees Pulses: 2+ and symmetric all extremities. Skin: Skin color, texture, turgor normal. No rashes or lesions Lymph nodes: Cervical, supraclavicular, and axillary nodes normal.  
Neurologic: CNII-XII intact. Normal strength, sensation and reflexes throughout.  
  
Consults: None Significant Diagnostic Studies: see note Disposition: home Discharge Medications:  
Current Discharge Medication List  
  
START taking these medications Details  
acetaminophen (TYLENOL) 325 mg tablet Take 2 Tabs by mouth every six (6) hours as needed for Pain. Qty: 20 Tab, Refills: 0  
  
albuterol (PROVENTIL HFA, VENTOLIN HFA, PROAIR HFA) 90 mcg/actuation inhaler Take 1 Puff by inhalation every four (4) hours as needed for Wheezing or Shortness of Breath. Qty: 1 Inhaler, Refills: 1  
  
guaiFENesin-dextromethorphan (ROBITUSSIN DM) 100-10 mg/5 mL syrup Take 5 mL by mouth every six (6) hours as needed for Cough or Congestion for up to 10 days. Qty: 1 Bottle, Refills: 1  
  
levoFLOXacin (LEVAQUIN) 750 mg tablet Take 1 Tab by mouth every twenty-four (24) hours for 4 days. Qty: 4 Tab, Refills: 0 CONTINUE these medications which have NOT CHANGED Details  
atenoloL (TENORMIN) 25 mg tablet Take 25 mg by mouth daily. furosemide (LASIX) 10 mg/mL oral solution Take  by mouth daily. losartan (COZAAR) 25 mg tablet Take 25 mg by mouth daily. aspirin 81 mg chewable tablet Take 81 mg by mouth daily. STOP taking these medications  
  
 amLODIPine (NORVASC) 10 mg tablet Comments:  
Reason for Stopping:   
   
  
 
 
Activity: Activity as tolerated Diet: Regular Diet Wound Care: None needed Follow-up Appointments Procedures  FOLLOW UP VISIT Appointment in: One Week pcp  
  pcp Standing Status:   Standing Number of Occurrences:   1   Order Specific Question:   Appointment in  
 Answer: One Week Signed By: Kamran Rivas MD   
 May 7, 2020

## 2020-05-07 NOTE — PROGRESS NOTES
's visit via telephone call. I conveyed care and concern to Ms. Boswell. She was in good spirits and voiced no spiritual needs. Ms. Mitch Davalos is anticipating discharge today.   
 
Srinivasa Robles MDIV, [facility]

## 2020-05-08 NOTE — PROGRESS NOTES
Patient contacted regarding recent discharge and COVID-19 risk LPN Care Coordinator  contacted the patient by telephone to perform post discharge assessment. Verified name and  with patient as identifiers. Patient has following risk factors of: heart failure. CC reviewed discharge instructions, medical action plan and red flags related to discharge diagnosis. Reviewed and educated them on any new and changed medications related to discharge diagnosis. Advised obtaining a 90-day supply of all daily and as-needed medications. Education provided regarding infection prevention, and signs and symptoms of COVID-19 and when to seek medical attention with patient who verbalized understanding. Discussed exposure protocols and quarantine from [address] you at higher risk for severe illness  and given an opportunity for questions and concerns. The patient agrees to contact the COVID-19 hotline [phone] or PCP office for questions related to their healthcare. CC provided contact information for future reference. From CDC: Are you at higher risk for severe illness?  Wash your hands often.  Avoid close contact (6 feet, which is about two arm lengths) with people who are sick.  Put distance between yourself and other people if COVID-19 is spreading in your community.  Clean and disinfect frequently touched surfaces.  Avoid all cruise travel and non-essential air travel.  Call your healthcare professional if you have concerns about COVID-19 and your underlying condition or if you are sick. For more information on steps you can take to protect yourself, see CDC's How to Protect Yourself Patient/family/caregiver given information for [employer] and agrees to enroll no Patient's preferred e-mail:  N/A Patient's preferred phone number: [phone] Based on Loop alert triggers, patient will be contacted by nurse care manager for worsening symptoms. Plan for follow-up call in 7-14 days based on severity of symptoms and risk factors.

## 2020-05-08 NOTE — PROGRESS NOTES
Patient contacted regarding COVID-19 Covid 19 Testing negative results . LPN Care Coordinator  contacted the patient by telephone to perform post discharge assessment. Verified name and  with patient as identifiers. Provided introduction to self, and explanation of the CC  role, and reason for call due to risk factors for infection and/or exposure to COVID-19. Symptoms reviewed with patient who verbalized the following symptoms: cough. Patient states cough is relieved with medication. Due to no new or worsening symptoms encounter was not routed to provider for escalation. Patient has following risk factors of: heart failure. CC reviewed discharge instructions, medical action plan and red flags such as increased shortness of breath, increasing fever and signs of decompensation with patient who verbalized understanding. Discussed exposure protocols and quarantine with CDC Guidelines What to do if you are sick with coronavirus disease .  Patient was given an opportunity for questions and concerns. The patient agrees to contact the [facility] exposure line [phone], [address]: ([phone]) and PCP office for questions related to their healthcare. CC provided contact information for future needs. Reviewed and educated patient on any new and changed medications related to discharge diagnosis. Patient/family/caregiver given information for [employer] and agrees to enroll no Patient's preferred e-mail:  N/A Patient's preferred phone number: [phone] Based on Loop alert triggers, patient will be contacted by nurse care manager for worsening symptoms. Plan for follow-up call in 5-7 days based on severity of symptoms and risk factors.

## 2020-05-12 NOTE — CDMP QUERY
Patient admitted with suspected Covid 19. Noted documentation of pneumonia in admit notes and on problem list but it is not mentioned in the narrative on the Dc summary. Please document if you are treating any of the following: 
 
Pneumonia ( please specify type) Pneumonia ruled out Other Unknown The medical record reflects the following:   
 
   Risk Factors: bronchiectasis, chronic pedal edema, htn Clinical Indicators: cough, sob, CXR = No consolidation. Cardiomegaly, pulmonary vascular congestion. 
  
   Treatment: Levaquin, O2 Thank you, Petar Guadalupe RN, BSN, CDS Clinical Documentation Management Program 
[facility] AT [address] 
[phone] [email or web address] 
 
 
 [employer] school

## 2020-05-18 NOTE — PROGRESS NOTES
Attempted TEJINDER Covid 19 Risk follow up call . Unable to leave a message on V/M. Mailbox is full. Will attempt next outreach in 3-5 days.

## 2020-05-22 NOTE — PROGRESS NOTES
Patient resolved from Transition of Care episode on 5/22/2020  Discussed COVID-19 related testing which was available at this time. Test results were negative. Patient informed of results, if available? yes     Patient/family has been provided the following resources and education related to COVID-19:                         Signs, symptoms and red flags related to COVID-19            CDC exposure and quarantine guidelines            [facility] exposure contact - [phone]            Contact for their local Department of Health                 Patient currently reports that she has no problems with cough,SOB,fever or myalgia. .    No further outreach scheduled with this CTN/ACM/LPN/HC. Episode of Care resolved. Patient has this CTN/ACM/LPN/HC contact information if future needs arise.

## 2020-06-20 PROBLEM — E87.70 EDEMA DUE TO HYPERVOLEMIA: Status: ACTIVE | Noted: 2020-01-01

## 2020-06-20 PROBLEM — R09.02 HYPOXIA: Status: ACTIVE | Noted: 2020-01-01

## 2020-06-20 PROBLEM — J96.91 RESPIRATORY FAILURE WITH HYPOXIA (HCC): Status: ACTIVE | Noted: 2020-01-01

## 2020-06-20 PROBLEM — J96.01 ACUTE RESPIRATORY FAILURE WITH HYPOXIA AND HYPERCARBIA (HCC): Status: ACTIVE | Noted: 2020-01-01

## 2020-06-20 PROBLEM — I16.0 HYPERTENSIVE URGENCY: Status: ACTIVE | Noted: 2020-01-01

## 2020-06-20 PROBLEM — R29.818 SUSPECTED SLEEP APNEA: Status: ACTIVE | Noted: 2020-01-01

## 2020-06-20 PROBLEM — J96.02 ACUTE RESPIRATORY FAILURE WITH HYPOXIA AND HYPERCARBIA (HCC): Status: ACTIVE | Noted: 2020-01-01

## 2020-06-20 PROBLEM — I48.91 ATRIAL FIBRILLATION (HCC): Status: ACTIVE | Noted: 2020-01-01

## 2020-06-20 NOTE — PROGRESS NOTES
END OF SHIFT NOTE: 
 
INTAKE/OUTPUT 
06/19 0701 - 06/20 0700 In: 1000 [I.V.:1000] Out: 200 [Urine:200] Voiding: YES Catheter: NO 
Drain:   
 
 
Flatus: Patient does have flatus present. Stool:  0 occurrences. Characteristics: 
  
 
Emesis: 0 occurrences. Characteristics: VITAL SIGNS Patient Vitals for the past 12 hrs: 
 Temp Pulse Resp BP SpO2  
06/20/20 1609 97.4 °F (36.3 °C) 91 18 126/79 100 % 06/20/20 1603     96 %  
06/20/20 1600  (!) 101     
06/20/20 1200  100     
06/20/20 1132 97.6 °F (36.4 °C) 98 18 149/69 96 %  
06/20/20 0800  (!) 101     
06/20/20 0741 98.1 °F (36.7 °C) (!) 102 18 (!) 141/102 92 % Pain Assessment Pain Intensity 1: 0 (06/20/20 1210) Patient Stated Pain Goal: 3 Ambulating Yes, in room. Shift report given to oncoming nurse at the bedside.  
 
Ran Girard RN

## 2020-06-20 NOTE — H&P
Hospitalist Note Admit Date:  2020 10:58 PM  
Name:  Zandra Harmon Age:  78 y.o. 
:  1950 MRN:  [id number] PCP:  Trent Sheth MD 
Treatment Team: Attending Provider: Tera Phoenix, MD; Primary Nurse: Belen Xiong RN 
 
HPI/Subjective:  
 
Zandra Harmon is a 78 y.o. female with a history of hypertension, CHF, and bronchiectasis presenting with dizziness. Patient states that she recently started Xarelto 8 days ago. 2 days after starting Xarelto she began to have dizzy spells. She describes dizziness initially with coughing to clear her lungs, which she has not experienced before. Then she developed dizziness with walking at home. She has not had any episodes of fall or syncope. She denies chest pain, headache, abdominal pain, nausea, vomiting, diarrhea, fevers, chills or diaphoresis. She has no melena, hematochezia, or hemoptysis. Patient has shortness of breath and cough at baseline from her bronchiectasis. She does not use home oxygen. She states her blood pressures are usually 120s when she is seen in the clinic. On arrival to the ED her blood pressures in the 180s to low 90s. She was found to be hypoxic with a O2 sat of 74% on room air improved to the low 90s on oxygen. 10 systems reviewed and negative except as noted in HPI. Past Medical History:  
Diagnosis Date  Bronchiectasis ([address]  Congestive heart failure (CHF) ([facility] [address]  HTN (hypertension)  Inguinal hernia  Pneumonia Past Surgical History:  
Procedure Laterality Date  HX BACK SURGERY    
 HX HYSTERECTOMY Allergies Allergen Reactions  Pseudoephedrine Other (comments) \"gave me high blood pressure\" Social History Tobacco Use  Smoking status: Never Smoker  Smokeless tobacco: Never Used  Tobacco comment: Has been second hand smoke all her life Substance Use Topics  Alcohol use: Never Frequency: Never Family History Problem Relation Age of Onset  Diabetes Sister  Diabetes Brother There is no immunization history on file for this patient. PTA Medications: 
Prior to Admission Medications Prescriptions Last Dose Informant Patient Reported? Taking?  
acetaminophen (TYLENOL) 325 mg tablet   No No  
Sig: Take 2 Tabs by mouth every six (6) hours as needed for Pain. albuterol (PROVENTIL HFA, VENTOLIN HFA, PROAIR HFA) 90 mcg/actuation inhaler   No No  
Sig: Take 1 Puff by inhalation every four (4) hours as needed for Wheezing or Shortness of Breath. aspirin 81 mg chewable tablet   Yes No  
Sig: Take 81 mg by mouth daily. atenoloL (TENORMIN) 25 mg tablet   Yes No  
Sig: Take 25 mg by mouth daily. furosemide (LASIX) 10 mg/mL oral solution   Yes No  
Sig: Take  by mouth daily. losartan (COZAAR) 25 mg tablet   Yes No  
Sig: Take 25 mg by mouth daily. Facility-Administered Medications: None Objective:  
 
Patient Vitals for the past 24 hrs: 
 Temp Pulse Resp BP SpO2  
06/20/20 0253  95  (!) 183/93 94 % 06/20/20 0222    173/88 91 %  
06/20/20 0152  97  (!) 172/103 94 % 06/20/20 0150     94 % 06/20/20 0122  98  (!) 158/107 98 %  
06/20/20 0022  93  (!) 182/103   
06/20/20 0007  91  161/89 98 % 06/19/20 2312 98.2 °F (36.8 °C) (!) 101 18 (!) 195/104 (!) 74 % Oxygen Therapy O2 Sat (%): 94 % (06/20/20 0253) Pulse via Oximetry: 95 beats per minute (06/20/20 0253) O2 Device: Nasal cannula (06/20/20 0150) O2 Flow Rate (L/min): 3 l/min (06/20/20 0150) Estimated body mass index is 47.55 kg/m² as calculated from the following: 
  Height as of this encounter: 5' 2\" (1.575 m). Weight as of this encounter: 117.9 kg (260 lb). Intake/Output Summary (Last 24 hours) at 6/20/2020 0841 Last data filed at 6/20/2020 6794 Gross per 24 hour Intake 1000 ml Output  Net 1000 ml *Note that automatically entered I/Os may not be accurate; dependent on patient compliance with collection and accurate  by assistants. Physical Exam: 
General:    No acute distress. Awake. Alert. Eyes:   Normal sclerae, no icterus or injection. PERRL. Extraocular movements intact. HENT:  Normocephalic, atraumatic. Moist mucous membranes. No cervical LAD. CV:   RRR. Normal S1/S2. No m/r/g. Lungs:  CTAB with scattered occasional end expiratory wheezing, no rhonchi or rales. No increased work of breathing. Abdomen: Soft, nontender, nondistended. Extremities: Warm and dry. No cyanosis. Pedal edema. Neurologic: CN II-XII grossly intact. Sensation grossly intact. Able to move all extremities. Skin:     No rashes or jaundice. Normal coloration Psych:  Normal mood and affect. I reviewed the labs, imaging, EKGs, telemetry, and other studies done this admission. Data Review:  
Recent Results (from the past 24 hour(s)) CBC WITH AUTOMATED DIFF Collection Time: 06/19/20 11:15 PM  
Result Value Ref Range WBC 9.1 4.3 - 11.1 K/uL  
 RBC 4.61 4.05 - 5.2 M/uL  
 HGB 11.2 (L) 11.7 - 15.4 g/dL HCT 37.8 35.8 - 46.3 % MCV 82.0 79.6 - 97.8 FL  
 MCH 24.3 (L) 26.1 - 32.9 PG  
 MCHC 29.6 (L) 31.4 - 35.0 g/dL  
 RDW 17.5 (H) 11.9 - 14.6 % PLATELET 444 847 - 145 K/uL MPV 10.5 9.4 - 12.3 FL ABSOLUTE NRBC 0.00 0.0 - 0.2 K/uL  
 DF AUTOMATED NEUTROPHILS 74 43 - 78 % LYMPHOCYTES 16 13 - 44 % MONOCYTES 7 4.0 - 12.0 % EOSINOPHILS 3 0.5 - 7.8 % BASOPHILS 0 0.0 - 2.0 % IMMATURE GRANULOCYTES 0 0.0 - 5.0 %  
 ABS. NEUTROPHILS 6.8 1.7 - 8.2 K/UL  
 ABS. LYMPHOCYTES 1.4 0.5 - 4.6 K/UL  
 ABS. MONOCYTES 0.6 0.1 - 1.3 K/UL  
 ABS. EOSINOPHILS 0.2 0.0 - 0.8 K/UL  
 ABS. BASOPHILS 0.0 0.0 - 0.2 K/UL  
 ABS. IMM. GRANS. 0.0 0.0 - 0.5 K/UL METABOLIC PANEL, COMPREHENSIVE Collection Time: 06/19/20 11:15 PM  
Result Value Ref Range Sodium 140 136 - 145 mmol/L Potassium 3.7 3.5 - 5.1 mmol/L  Chloride 102 98 - 107 mmol/L  
 CO2 32 21 - 32 mmol/L Anion gap 6 (L) 7 - 16 mmol/L Glucose 104 (H) 65 - 100 mg/dL BUN 22 8 - 23 MG/DL Creatinine 1.12 (H) 0.6 - 1.0 MG/DL  
 GFR est AA >60 >60 ml/min/1.73m2 GFR est non-AA 51 (L) >60 ml/min/1.73m2 Calcium 8.7 8.3 - 10.4 MG/DL Bilirubin, total 1.4 (H) 0.2 - 1.1 MG/DL  
 ALT (SGPT) 27 12 - 65 U/L  
 AST (SGOT) 40 (H) 15 - 37 U/L Alk. phosphatase 72 50 - 136 U/L Protein, total 6.4 6.3 - 8.2 g/dL Albumin 3.0 (L) 3.2 - 4.6 g/dL Globulin 3.4 2.3 - 3.5 g/dL A-G Ratio 0.9 (L) 1.2 - 3.5 All Micro Results None Current Facility-Administered Medications Medication Dose Route Frequency  azithromycin (ZITHROMAX) 500 mg in 0.9% sodium chloride (MBP/ADV) 250 mL  500 mg IntraVENous NOW Current Outpatient Medications Medication Sig  
 acetaminophen (TYLENOL) 325 mg tablet Take 2 Tabs by mouth every six (6) hours as needed for Pain.  albuterol (PROVENTIL HFA, VENTOLIN HFA, PROAIR HFA) 90 mcg/actuation inhaler Take 1 Puff by inhalation every four (4) hours as needed for Wheezing or Shortness of Breath.  atenoloL (TENORMIN) 25 mg tablet Take 25 mg by mouth daily.  furosemide (LASIX) 10 mg/mL oral solution Take  by mouth daily.  losartan (COZAAR) 25 mg tablet Take 25 mg by mouth daily.  aspirin 81 mg chewable tablet Take 81 mg by mouth daily. Other Studies: No results found for this visit on 06/19/20. Xr Chest Pa Lat Result Date: 6/19/2020 EXAM: Chest x-ray. INDICATION: Hypoxia. COMPARISON: Prior chest x-ray on May 4, 2020. TECHNIQUE: Frontal and lateral views of the chest were obtained. FINDINGS: Cardiomegaly is unchanged. The lungs are clear. No pneumothorax, vascular congestion or pleural effusion is seen. IMPRESSION: 1. No acute process. 2. Unchanged cardiomegaly. Ct Head Wo Cont Result Date: 6/19/2020 EXAM: Noncontrast CT head. INDICATION: Dizziness. COMPARISON: None. TECHNIQUE: Noncontrast CT images of the head were obtained. Radiation dose reduction techniques were used for this study. Our CT scanners use one or all of the following:  Automated exposure control, adjustment of the mA or kV according to patient size, iterative reconstruction. FINDINGS: Brain volume is appropriate for age. No acute infarct, hemorrhage or mass is identified. There is no mass effect, midline shift or depressed fracture. The visualized paranasal sinuses and mastoid air cells are clear. IMPRESSION: No acute process. Ct Chest W Cont Result Date: 6/19/2020 EXAM: CT CHEST WITH CONTRAST INDICATION: pleural effusion, persistent atelectasis on CXR. COMPARISON: Chest x-ray 5/4/2020 TECHNIQUE:  CT imaging was performed of the chest after intravenous injection of 100 mL Isovue 370. Intravenous contrast was used for better evaluation of solid organs and vascular structures. Coronal reformatted imaging provided. Radiation dose reduction techniques were used for this study. Our CT scanners use one or all of the following: Automated exposure control, adjustment of the mA and/or kV according to patient size, iterative reconstruction. FINDINGS: Mediastinum and visualized thyroid: Multistation mediastinal adenopathy, including a right paratracheal lymph node measuring 1 cm short axis. Heart: Enlarged. Large Vessels: Normal. Lungs and pleura: There is a trace left pleural effusion. 5 mm groundglass nodule anteriorly in the left lower lobe. Mosaic pattern of attenuation, especially in the lung bases. No consolidation. Airways: Normal. Lymph nodes: As above Bones/Soft tissues: No aggressive osseous lesion. Visualized abdomen: Multiple cysts throughout the liver. There is a large cyst and geode the left lobe which measures 13.7 x 11.8 cm and is without clear worrisome features. Russ Christiansen IMPRESSION: 1. Trace left pleural effusion.  Nonspecific multistation mediastinal adenopathy. 2.  Mosaic pattern of lung attenuation, which could reflect atelectasis or a be secondary to inflammation of small airways. 3.  Hepatic cysts, including a large 13.7 cm cyst anteriorly to the left lobe. If the patient has discomfort in this region, consider interventional radiology consultation for aspiration and possible sclerotherapy. Assessment and Plan:  
 
Hospital Problems as of 6/20/2020 Never Reviewed Codes Class Noted - Resolved POA Hypertensive urgency ICD-10-CM: I16.0 ICD-9-CM: 401.9  6/20/2020 - Present Unknown Jeanette King is a 78 y.o. female presenting with hypertensive urgency and hypoxic respiratory failure Plan: #Hypertensive urgency Continue atenolol Continue losartan Continue Lasix Give hydralazine as needed for breakthrough hypertension Check troponin Continue to monitor on telemetry #Acute hypoxic respiratory failure #Bronchiectasis Continue supplemental oxygen Continue duo nebs Check procalcitonin Start azithromycin Robitussin for cough as needed DVT ppx ordered Code status:  Full Estimated LOS:  Greater than 2 midnights Risk:  high Signed: 
Tnig Grey MD

## 2020-06-20 NOTE — CONSULTS
PULMONARY/CCM CONSULT :  6/20/2020 Date of Admission:  6/19/2020 The patient's chart has been reviewed and the chart has been discussed with nursing staff. Subjective: This patient has been seen and evaluated at the request of Dr. Eb Mcdonnell. Patient is a 78 y.o. female presented to the ER with dizziness. She has a PMHx of HTN, CHF, and bronchiectasis. She was placed on Xarelto 8 days ago, by her PCP for apparent A fib. The patient states that a physician mentioned that she was going in and out of A Fib upon her last admission in May, but she was not seen by cardiology at that time, nor did she had any workup outpatient. She started with dizzy spells two days after initiating therapy. Initially the dizziness was only during coughing spells, but it has continued to worsen. She was transported to the ER via EMS, where she was also noted to be hypertensive with SBPs in the 180s DBPs low 90s. Per the patient she runs typically with SBP in the 120s when she is seen at the clinic. She was also hypoxic with an O2 sat of 74% on RA upon admission. She denies any bleeding, but states she does bruise easily. She has shortness of breath and cough at baseline from her bronchiolectasis, but does not wear home O2. She has never been seen in our office and states her PCP diagnosed her with bronchiolectasis approx 3 years ago. She denies ever being a smoker. 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  She has never had any further workup for the bronchiectasis and is managed by her PCP. She was just recently placed on albuterol at home per her PCP, until then she states she would boil water and apple cider vinegar as a treatment to help her open her lungs. She has never been diagnosed with sleep apnea, COPD, or asthma.   She does report that she snores and sleeps with 2 pillows, but she states she has done this for years, as she has significant sinus drainage from allergies. Per the RN, the patient was very lethargic this AM and unable to waken, even with noxious stimuli. An ABG showed pH 7.339/pCO2 54.2/pO2 74/HCO3 29.2. She was placed on 6 PLM via HFNC and O2 sats increased and pt became more aware and was able to converse with staff. She is currently sitting up in the bed on 6 LPM via HFNC with an O2 sat of % while conversing. She has a high probability of having sleep apnea given her obesity and overall anatomy. She will need further workup to evaluate. Past Surgical History:  
Procedure Laterality Date  HX BACK SURGERY    
 HX HYSTERECTOMY Social History Tobacco Use  Smoking status: Never Smoker  Smokeless tobacco: Never Used  Tobacco comment: Has been second hand smoke all her life Substance Use Topics  Alcohol use: Never Frequency: Never Family History Problem Relation Age of Onset  Diabetes Sister  Diabetes Brother Allergies Allergen Reactions  Pseudoephedrine Other (comments) \"gave me high blood pressure\" Prior to Admission Medications Prescriptions Last Dose Informant Patient Reported? Taking?  
acetaminophen (TYLENOL) 325 mg tablet Not Taking at Unknown time  No No  
Sig: Take 2 Tabs by mouth every six (6) hours as needed for Pain. albuterol (PROVENTIL HFA, VENTOLIN HFA, PROAIR HFA) 90 mcg/actuation inhaler 6/19/2020 at Unknown time  No Yes Sig: Take 1 Puff by inhalation every four (4) hours as needed for Wheezing or Shortness of Breath. aspirin 81 mg chewable tablet 6/19/2020 at Unknown time  Yes Yes Sig: Take 81 mg by mouth daily. atenoloL (TENORMIN) 25 mg tablet 6/19/2020 at Unknown time  Yes Yes Sig: Take 25 mg by mouth daily. ergocalciferol (Vitamin D2) 1,250 mcg (50,000 unit) capsule 6/13/2020 at Unknown time  Yes Yes Sig: Take 50,000 Units by mouth every seven (7) days. Taken on Sunday, per patient furosemide (LASIX) 10 mg/mL oral solution 6/19/2020 at Unknown time  Yes Yes Sig: Take  by mouth daily. losartan (COZAAR) 25 mg tablet 6/19/2020 at Unknown time  Yes Yes Sig: Take 25 mg by mouth daily. rivaroxaban (Xarelto) 15 mg tab tablet 6/19/2020 at Unknown time  Yes Yes Sig: Take 15 mg by mouth daily (with dinner). Facility-Administered Medications: None MEDS SCHEDULED:   
Current Facility-Administered Medications Medication Dose Route Frequency  aspirin chewable tablet 81 mg  81 mg Oral DAILY  furosemide (LASIX) tablet 10 mg  10 mg Oral DAILY  atenoloL (TENORMIN) tablet 50 mg  50 mg Oral DAILY  losartan (COZAAR) tablet 50 mg  50 mg Oral DAILY  sodium chloride (NS) flush 5-40 mL  5-40 mL IntraVENous Q8H  
 sodium chloride (NS) flush 5-40 mL  5-40 mL IntraVENous PRN  
 acetaminophen (TYLENOL) tablet 650 mg  650 mg Oral Q4H PRN  
 ondansetron (ZOFRAN ODT) tablet 4 mg  4 mg Oral Q4H PRN  
 senna-docusate (PERICOLACE) 8.6-50 mg per tablet 1 Tab  1 Tab Oral BID PRN  
 enoxaparin (LOVENOX) injection 40 mg  40 mg SubCUTAneous Q24H  
 [START ON 6/21/2020] azithromycin (ZITHROMAX) 500 mg in 0.9% sodium chloride (MBP/ADV) 250 mL  500 mg IntraVENous Q24H  
 guaiFENesin-codeine (ROBITUSSIN AC) 100-10 mg/5 mL solution 5 mL  5 mL Oral Q4H PRN  
 hydrALAZINE (APRESOLINE) 20 mg/mL injection 10 mg  10 mg IntraVENous Q4H PRN  
 dilTIAZem IR (CARDIZEM) tablet 30 mg  30 mg Oral Q6H  
 sodium chloride 3% hypertonic nebulizer soln  4 mL Nebulization BID RT  
 albuterol-ipratropium (DUO-NEB) 2.5 MG-0.5 MG/3 ML  3 mL Nebulization QID RT  
 guaiFENesin-dextromethorphan (ROBITUSSIN DM) 100-10 mg/5 mL syrup 10 mL  10 mL Oral TID Review of Systems Constitutional: negative for fevers, chills, sweats, anorexia and weight loss Eyes: negative for visual disturbance, irritation and redness Ears, nose, mouth, throat, and face: positive for snoring, negative for tinnitus, ear drainage, nasal congestion and epistaxis Respiratory: positive for cough, sputum, wheezing, dyspnea on exertion or bronchiectasis Cardiovascular: positive for dyspnea, palpitations, irregular heart beats, lower extremity edema Gastrointestinal: negative for vomiting, diarrhea and constipation Genitourinary:negative for dysuria and urinary incontinence Integument/breast: negative for rash and dryness Hematologic/lymphatic: positive for easy bruising, negative for blood in stool or urine and coughing up blood Musculoskeletal:positive for back pain Neurological: negative Behavioral/Psych: negative for anxiety and depression Objective:  
 
Vitals:  
 06/20/20 0450 06/20/20 0741 06/20/20 0800 06/20/20 1132 BP: (!) 154/95 (!) 141/102  149/69 Pulse: 100 (!) 102 (!) 101 98 Resp: 19 18 18 Temp: 98.6 °F (37 °C) 98.1 °F (36.7 °C)  97.6 °F (36.4 °C) SpO2: 92% 92%  96% Weight:      
Height:      
 
No intake/output data recorded. 06/18 1901 - 06/20 0700 In: 1000 [I.V.:1000] Out: 200 [Urine:200] PHYSICAL EXAM  
 
Physical Exam:  
General:  Alert, cooperative, no acute distress, appears stated age. Eyes:  Conjunctivae/corneas clear. Nose: Nares patent and moist.   
Mouth/Throat: Lips, mucosa, and tongue pink and intact. Neck: Supple, symmetrical.  
Respiratory:   Clear upon auscultation, on 6 LPM via HFNC with O2 sat % Cardiovascular:  Irreg, irreg, S1, S2, no murmur, click, rub or gallop. Telemetry monitor: A fib GI:   Abdomen soft, non-tender. Bowel sounds active X 4 Q. No masses, Musculoskeletal: Extremities symmetrical, atraumatic, no cyanosis, 2+ pitting BLE edema. Pulses: 2+ and symmetric all extremities. Skin: Skin color, texture, turgor normal. No rashes or lesions. Ecchymotic areas noted to LUE Neurologic: 2+ strength bilateral upper and lower extremities, sensation throughout appropriate. Alert and oriented.   
 
 
 
 
CHEST X-RAYS: 
 6/19/2020 CT:  
6/19/2020 IMPRESSION: 
1. Trace left pleural effusion. Nonspecific multistation mediastinal 
adenopathy. 2.  Mosaic pattern of lung attenuation, which could reflect atelectasis or a be 
secondary to inflammation of small airways. 3.  Hepatic cysts, including a large 13.7 cm cyst anteriorly to the left lobe. If the patient has discomfort in this region, consider interventional radiology 
consultation for aspiration and possible sclerotherapy. LABS Recent Labs  
  06/19/20 
2315 WBC 9.1 HGB 11.2* HCT 37.8  Recent Labs  
  06/19/20 
2315   
K 3.7  * CO2 32 BUN 22  
CREA 1.12* Results for Mariann Phillip (MRN [id number]) as of 6/20/2020 11:26 Ref. Range 6/20/2020 05:02 Procalcitonin Latest Units: ng/mL 0.06 Troponin-High Sensitivity Latest Ref Range: 0 - 14 pg/mL 6.7 Results for Mariann Phillip (MRN [id number]) as of 6/20/2020 11:26 Ref. Range 6/20/2020 08:27  
pH (POC) Latest Ref Range: 7.35 - 7.45   7.339 (L)  
pCO2 (POC) Latest Ref Range: 35 - 45 MMHG 54.2 (H)  
pO2 (POC) Latest Ref Range: 75 - 100 MMHG 74 (L) HCO3 (POC) Latest Ref Range: 22 - 26 MMOL/L 29.2 (H)  
sO2 (POC) Latest Ref Range: 95 - 98 % 93 (L) Base excess (POC) Latest Units: mmol/L 2 Specimen type (POC) Latest Units:   ARTERIAL Flow rate (POC) Latest Units: L/min 6.000 Site Latest Units:   RIGHT RADIAL Device: Latest Units:   NASAL CANNULA Allens test (POC) Latest Units:   YES Respiratory comment: Unknown NurseNotified Assessment:  
 
Hospital Problems  Never Reviewed Codes Class Noted POA * (Principal) Hypertensive urgency ICD-10-CM: I16.0 ICD-9-CM: 401.9  6/20/2020 Unknown Atrial fibrillation [facility] ICD-10-CM: I48.91 
ICD-9-CM: 427.31  6/20/2020 Unknown Hypoxia ICD-10-CM: R09.02 
ICD-9-CM: 799.02  6/20/2020 Unknown  Acute respiratory failure with hypoxia and hypercarbia ([address] ICD-10-CM: J96.01, J96.02 
ICD-9-CM: 518.81  6/20/2020 Unknown Edema due to hypervolemia ICD-10-CM: R60.9, E87.70 ICD-9-CM: 782.3, 276.69  6/20/2020 Yes Suspected sleep apnea ICD-10-CM: R29.818 ICD-9-CM: 781.99  6/20/2020 Yes Bronchiectasis ([address] ICD-10-CM: J47.9 ICD-9-CM: 494.0  5/5/2020 Yes Plan:  
--sputum culture--she is already on zithromax D1 
--HOLDEN tonight 
--change xopenex to scheduled and add 3% hypertonic saline with nebs  
--review CT scan--has mosaic pattern noted will likely need PFTs and sleep study upon d/c as she has a high probability of having sleep apnea and needs further workup for bronchiectasis --encourage IS  
--echo to be ordered per cardiology --flutter valve at bedside 
--mucolytic therapy Rudy Chandler,  NP-C Lungs:  Crackles noted Heart:  RRR with no Murmur/Rubs/Gallops Additional Comments:  Almost certainly has JEANNE and hypervolemia. Will wean O2 since her sat is 100% now on 4 LPM. Use empiric CPAP with sleep and arrange op PSG and office follow up. Cont. BD and Zithromax for now I have spoken with and examined the patient. I agree with the above assessment and plan as documented. Leslye Wyatt MD 
 
 
 
More than 50% of time documented was spent in face-to-face contact with the patient and in the care of the patient on the floor/unit where the patient is located.

## 2020-06-20 NOTE — PROGRESS NOTES
Reviewed notes prior to visit for spiritual concerns She is calm Sitting on side of bed Thanked her for allowing us to serve her Encouraged Patient said she didn't sleep very well Prayerful that today is restful for her Fatmata Escobar, staff

## 2020-06-20 NOTE — PROGRESS NOTES
06/20/20 0450 Dual Skin Pressure Injury Assessment Dual Skin Pressure Injury Assessment WDL Second Care Provider (Based on [address] [place] [address]) Mell Kim RN Skin Integumentary Skin Integumentary (WDL) WDL Pressure  Injury Documentation No Pressure Injury Noted-Pressure Ulcer Prevention Initiated Skin Color Appropriate for ethnicity; Ecchymosis (comment) (scattered bruising BUE's) Skin Condition/Temp Warm;Dry Skin Integrity Intact;Scars (comment) Turgor Non-tenting Wound Prevention and Protection Methods Orientation of Wound Prevention Posterior Location of Wound Prevention Sacrum/Coccyx Dressing Present  No  
Wound Offloading (Prevention Methods) Bed, pressure reduction mattress

## 2020-06-20 NOTE — CONSULTS
[facility] 
CONSULTATION Name:  Henry De Jesus 
MR#:  [id number] :  1950 ACCOUNT #:  [de-identified] DATE OF SERVICE:  2020 REASON FOR CONSULTATION:  Atrial fib. HISTORY OF PRESENT ILLNESS:  This is a 62-year-old female with a history of hypertension, morbid obesity, who was seen in 2020 with some respiratory issues and was in atrial fibrillation with a controlled rate at that time. She was not placed on any type of medication at that time, but since then as an outpatient, her family doctor started her on Xarelto about seven or eight days ago. She has had some dizziness and shortness of breath. She came in again with some high blood pressure, also shortness of breath. She also has some retained CO2. She was felt to possibly have bronchiectasis and a CT scan of the chest showed no clot but she had some mosaic pattern to her lungs with some mild mediastinal adenopathy. She still had AFib and the rates have been less than 100. She is not aware of any palpitation or chest pain. As far as I can tell, she has never had an echo to evaluate this. She denies diabetes but she snores heavily but has never been diagnosed with sleep apnea. She has had some chronic swelling. She has had a lot of morbid obesity and really cannot do a lot of activity. PAST MEDICAL HISTORY:  Noted for bronchiectasis, hypertension, inguinal hernia, pneumonias in the past, prior back surgery, hysterectomy years ago. SOCIAL HISTORY:  She does not smoke, never has. Lives with the . HOME MEDICATIONS: 
1.  Vitamin D. 
2.  She was on Proventil inhaler. 3.  Tenormin 25 at one point. 4.  She is on Lasix at home. 5.  Cozaar 25. 
6.  Aspirin. 7.  Tylenol. 8.  She recently got put on Xarelto. CURRENT MEDICATIONS: 
1. Xarelto. 2.  Tenormin 50 mg per day. 3.  Antibiotic at this point. 4.  Diltiazem. 5.  Lasix. 6.  Cozaar 50. FAMILY HISTORY:  She does not report any early family history of heart disease. REVIEW OF SYSTEMS: 
CONSTITUTIONAL:  She denies any weight loss. She has actually had weight gain over time. She has had no fever or chills. NEUROLOGIC:  She has had no neurological symptoms, strokes, TIAs, et [place]. RESPIRATORY:  She has respiratory issues, shortness of breath, coughing. No wheezing. She does snore heavily, she says. Nobody has evaluated her for sleep apnea. CARDIAC:  She denies chest pain. She is not aware of any palpitations. ABDOMEN:  She has no acute abdominal pain. EXTREMITIES:  She has had swelling noted. PHYSICAL EXAMINATION: 
GENERAL:  She is alert, in no distress. VITAL SIGNS:  Heart rate is irregular but less than 100, it is in the 90s at this time. Her weight is about 260 pounds. Blood pressure is 140/102. NECK:  Supple. LUNGS:  Some rhonchi. HEART:  Irregularly irregular without obvious murmur or rub. ABDOMEN:  Morbidly obese and soft. EXTREMITIES:  Chronic swelling, maybe 1+ to 2+. NEUROLOGIC:  She is alert and oriented. LABORATORY DATA:  Chemistry showed a sodium 140, potassium 3.7, creatinine 1. 12. Liver functions look very normal.  Procalcitonin 0.06. High-sensitivity troponin is normal at 6.7. Her white count is 9.1, hemoglobin 11.2. Imaging:  Chest x-ray yesterday showed no acute process, a little cardiomegaly. CT of the chest shows maybe a trace pleural effusion on the left. There is some mediastinal adenopathy. There is a mosaic pattern lung attenuation. Some airway disease. Hepatic cyst. 
 
EKG shows atrial fib low voltage, no acute changes. Heart rate is less than 100. IMPRESSION: 
1. Atrial fibrillation, asymptomatic. She is not aware of it. It was documented in 10/7056 and certainly it could be a lot longer than that. She is not aware of it.   We do not know anything about her left ventricular function and her left atrial size. I agree with continuing blood thinner, Xarelto, at this time and low-dose Tenormin for rate control. 2.  Chronic airway disease and bronchiectasis with a mosaic pattern on her x-ray being consulted with Pulmonary. She also could have [place] or sleep apnea. 3.  Morbid obesity. 4.  Hypertension. Blood pressure is still elevated. We need to titrate medications to get that down. RECOMMENDATIONS:  We will get an echocardiogram.  Continue beta-blocker. Continue blood thinner. We will follow with you. Thank you for this consultation. MD APURVA Salinas/[email or web address] 
D:  06/20/2020 12:03 
T:  06/20/2020 16:39 
JOB #:  [id number]

## 2020-06-20 NOTE — ED TRIAGE NOTES
Pt arrives from home, mask in place, via [facility] with c/o dizziness and some blood on tissue post BM. Pt states she has recently, 8 days ago, started Xarelto and her BP has been high since. Hypertensive with EMS, pt presents with CP of 195/104 in triage. Pt states compliant with all pressure medication. Per EMS, pt was ambulatory to stretcher, \"it was a good distance\", and was able to ambulate from stretcher into ED room and onto bed without gait disturbance. Pt describes the dizziness as \"a lightheadedness, kind of like when you sneeze and your head needs a minute. \" NIHSS of 0, Consulted with Marcus Ivan and Siddharth, no Code S at this time because the pt has a steady gait, no recent falls, and dizziness appears to be more episodic. Orders received.

## 2020-06-20 NOTE — PROGRESS NOTES
END OF SHIFT NOTE: 
 
INTAKE/OUTPUT 
 1900 -  0700 IVF: No fluids Voidin ml  
 
Flatus: Patient does have flatus present. Stool:  0 occurrences. Emesis: 0 occurrences. VITAL SIGNS Patient Vitals for the past 12 hrs: 
 Temp Pulse Resp BP SpO2  
20 0450 98.6 °F (37 °C) 100 19 (!) 154/95 92 %  
20 0352 98.2 °F (36.8 °C) 90 18 (!) 158/93 97 % 20 0253  95  (!) 183/93 94 % 20 0222    173/88 91 %  
20 0152  97  (!) 172/103 94 % 20 0150     94 % 20 0122  98  (!) 158/107 98 %  
20 0022  93  (!) 182/103   
20 0007  91  161/89 98 % 20 2312 98.2 °F (36.8 °C) (!) 101 18 (!) 195/104 (!) 74 % Pain Assessment Pain Intensity 1: 0 (20 0450) Patient Stated Pain Goal: 3 Ambulating Yes Shift report given to oncoming nurse at the bedside.  Hussein Amaya

## 2020-06-20 NOTE — ED PROVIDER NOTES
Pleasant 30-year-old female with a history of bronchiectasis presenting for transient and intermittent episodes of lightheaded. The patient has bronchiectasis and is not superbly active on a normal basis but states when she tries to get up and move around she feels like she gets a pressure in her head almost like when you try to sneeze it takes your second to catch herself. She denies any room spinning, loss of balance, nausea or vomiting. She is had no real headaches. She thinks is related to starting Xarelto recently. An additional concern was some blood on the toilet paper when she went to the bathroom today. She had not had a bowel movement and there was no pain she noted a small amount of red when she wiped. She had been told to keep an eye out for this and when she started having these lightheaded episodes and saw some blood on the toilet paper she came in for further evaluation. The patient does visibly seem short of breath but she states that her shortness of breath is chronic for her. The history is provided by the patient. Dizziness This is a new problem. The current episode started more than 2 days ago. The problem has not changed since onset. There was no focality noted. Pertinent negatives include no focal weakness, no loss of sensation, no loss of balance, no slurred speech, no speech difficulty, no memory loss, no movement disorder, no agitation, no auditory change, no mental status change, no unresponsiveness and no disorientation. There has been no fever. Associated symptoms include shortness of breath. Pertinent negatives include no chest pain, no vomiting, no altered mental status, no confusion, no headaches, no choking, no nausea, no bowel incontinence and no bladder incontinence. Past Medical History:  
Diagnosis Date  Bronchiectasis ([facility]  Congestive heart failure (CHF) ([facility]  Pneumonia Past Surgical History:  
Procedure Laterality Date  HX BACK SURGERY    
 HX HYSTERECTOMY No family history on file. Social History Socioeconomic History  Marital status:  Spouse name: Not on file  Number of children: Not on file  Years of education: Not on file  Highest education level: Not on file Occupational History  Not on file Social Needs  Financial resource strain: Not on file  Food insecurity Worry: Not on file Inability: Not on file  Transportation needs Medical: Not on file Non-medical: Not on file Tobacco Use  Smoking status: Never Smoker  Smokeless tobacco: Never Used Substance and Sexual Activity  Alcohol use: Never Frequency: Never  Drug use: Never  Sexual activity: Not on file Lifestyle  Physical activity Days per week: Not on file Minutes per session: Not on file  Stress: Not on file Relationships  Social connections Talks on phone: Not on file Gets together: Not on file Attends Congregational service: Not on file Active member of club or organization: Not on file Attends meetings of clubs or organizations: Not on file Relationship status: Not on file  Intimate partner violence Fear of current or ex partner: Not on file Emotionally abused: Not on file Physically abused: Not on file Forced sexual activity: Not on file Other Topics Concern  Not on file Social History Narrative  Not on file ALLERGIES: Pseudoephedrine Review of Systems Respiratory: Positive for shortness of breath. Negative for choking. Cardiovascular: Negative for chest pain. Gastrointestinal: Negative for bowel incontinence, nausea and vomiting. Genitourinary: Negative for bladder incontinence. Neurological: Positive for dizziness. Negative for focal weakness, speech difficulty, headaches and loss of balance. Psychiatric/Behavioral: Negative for agitation, confusion and memory loss. All other systems reviewed and are negative. Vitals:  
 06/19/20 2312 BP: (!) 195/104 Pulse: (!) 101 Resp: 18 Temp: 98.2 °F (36.8 °C) SpO2: (!) 74% Weight: 117.9 kg (260 lb) Height: 5' 2\" (1.575 m) Physical Exam 
Vitals signs and nursing note reviewed. Constitutional:   
   Appearance: She is well-developed. HENT:  
   Head: Normocephalic and atraumatic. Eyes:  
   Conjunctiva/sclera: Conjunctivae normal.  
   Pupils: Pupils are equal, round, and reactive to light. Neck: Musculoskeletal: Neck supple. Cardiovascular:  
   Rate and Rhythm: Tachycardia present. Rhythm irregular. Pulmonary:  
   Effort: Tachypnea present. Breath sounds: Examination of the right-upper field reveals decreased breath sounds. Examination of the left-upper field reveals decreased breath sounds. Examination of the right-middle field reveals decreased breath sounds. Examination of the left-middle field reveals decreased breath sounds. Examination of the right-lower field reveals decreased breath sounds. Examination of the left-lower field reveals decreased breath sounds. Decreased breath sounds present. Abdominal:  
   General: Bowel sounds are normal.  
   Palpations: Abdomen is soft. Genitourinary: 
   Rectum: Normal. Guaiac result negative. Comments: Light brown stool Musculoskeletal: Normal range of motion. Skin: 
   General: Skin is warm and dry. Neurological:  
   Mental Status: She is alert and oriented to person, place, and time. MDM Number of Diagnoses or Management Options Diagnosis management comments: 59-year-old female presenting for lightheadedness and a small amount of blood on the toilet paper. She is concerned she has some sort of internal bleeding resulting in these lightheaded spells. Amount and/or Complexity of Data Reviewed Clinical lab tests: ordered and reviewed (Results for orders placed or performed during the hospital encounter of 06/19/20 
-CBC WITH AUTOMATED DIFF Result                      Value             Ref Range WBC                         9.1               4.3 - 11.1 K* 
     RBC                         4.61              4.05 - 5.2 M* HGB                         11.2 (L)          11.7 - 15.4 * HCT                         37.8              35.8 - 46.3 % MCV                         82.0              79.6 - 97.8 * MCH                         24.3 (L)          26.1 - 32.9 * MCHC                        29.6 (L)          31.4 - 35.0 * RDW                         17.5 (H)          11.9 - 14.6 % PLATELET                    228               150 - 450 K/* MPV                         10.5              9.4 - 12.3 FL ABSOLUTE NRBC               0.00              0.0 - 0.2 K/* DF                          AUTOMATED NEUTROPHILS                 74                43 - 78 % LYMPHOCYTES                 16                13 - 44 % MONOCYTES                   7                 4.0 - 12.0 % EOSINOPHILS                 3                 0.5 - 7.8 % BASOPHILS                   0                 0.0 - 2.0 % IMMATURE GRANULOCYTES       0                 0.0 - 5.0 %   
     ABS. NEUTROPHILS            6.8               1.7 - 8.2 K/* ABS. LYMPHOCYTES            1.4               0.5 - 4.6 K/* ABS. MONOCYTES              0.6               0.1 - 1.3 K/* ABS. EOSINOPHILS            0.2               0.0 - 0.8 K/* ABS. BASOPHILS              0.0               0.0 - 0.2 K/* ABS. IMM. GRANS.            0.0               0.0 - 0.5 K/* 
-METABOLIC PANEL, COMPREHENSIVE Result                      Value             Ref Range      Sodium                      140               136 - 145 mm* 
 Potassium                   3.7               3.5 - 5.1 mm* Chloride                    102               98 - 107 mmo* CO2                         32                21 - 32 mmol* Anion gap                   6 (L)             7 - 16 mmol/L Glucose                     104 (H)           65 - 100 mg/* BUN                         22                8 - 23 MG/DL Creatinine                  1.12 (H)          0.6 - 1.0 MG* 
     GFR est AA                  >60               >60 ml/min/1* GFR est non-AA              51 (L)            >60 ml/min/1* Calcium                     8.7               8.3 - 10.4 M* Bilirubin, total            1.4 (H)           0.2 - 1.1 MG* ALT (SGPT)                  27                12 - 65 U/L   
     AST (SGOT)                  40 (H)            15 - 37 U/L Alk. phosphatase            72                50 - 136 U/L Protein, total              6.4               6.3 - 8.2 g/* Albumin                     3.0 (L)           3.2 - 4.6 g/* Globulin                    3.4               2.3 - 3.5 g/* A-G Ratio                   0.9 (L)           1.2 - 3.5     
) Tests in the radiology section of CPT®: ordered and reviewed (Xr Chest Pa Lat Result Date: 6/19/2020 EXAM: Chest x-ray. INDICATION: Hypoxia. COMPARISON: Prior chest x-ray on May 4, 2020. TECHNIQUE: Frontal and lateral views of the chest were obtained. FINDINGS: Cardiomegaly is unchanged. The lungs are clear. No pneumothorax, vascular congestion or pleural effusion is seen. IMPRESSION: 1. No acute process. 2. Unchanged cardiomegaly. Ct Head Wo Cont Result Date: 6/19/2020 EXAM: Noncontrast CT head. INDICATION: Dizziness. COMPARISON: None. TECHNIQUE: Noncontrast CT images of the head were obtained. Radiation dose reduction techniques were used for this study.   Our CT scanners use one or all of the following:  Automated exposure control, adjustment of the mA or kV according to patient size, iterative reconstruction. FINDINGS: Brain volume is appropriate for age. No acute infarct, hemorrhage or mass is identified. There is no mass effect, midline shift or depressed fracture. The visualized paranasal sinuses and mastoid air cells are clear. IMPRESSION: No acute process. Ct Chest W Cont Result Date: 6/19/2020 EXAM: CT CHEST WITH CONTRAST INDICATION: pleural effusion, persistent atelectasis on CXR. COMPARISON: Chest x-ray 5/4/2020 TECHNIQUE:  CT imaging was performed of the chest after intravenous injection of 100 mL Isovue 370. Intravenous contrast was used for better evaluation of solid organs and vascular structures. Coronal reformatted imaging provided. Radiation dose reduction techniques were used for this study. Our CT scanners use one or all of the following: Automated exposure control, adjustment of the mA and/or kV according to patient size, iterative reconstruction. FINDINGS: Mediastinum and visualized thyroid: Multistation mediastinal adenopathy, including a right paratracheal lymph node measuring 1 cm short axis. Heart: Enlarged. Large Vessels: Normal. Lungs and pleura: There is a trace left pleural effusion. 5 mm groundglass nodule anteriorly in the left lower lobe. Mosaic pattern of attenuation, especially in the lung bases. No consolidation. Airways: Normal. Lymph nodes: As above Bones/Soft tissues: No aggressive osseous lesion. Visualized abdomen: Multiple cysts throughout the liver. There is a large cyst and geode the left lobe which measures 13.7 x 11.8 cm and is without clear worrisome features. Wilcox Glow IMPRESSION: 1. Trace left pleural effusion. Nonspecific multistation mediastinal adenopathy.  2.  Mosaic pattern of lung attenuation, which could reflect atelectasis or a be secondary to inflammation of small airways. 3.  Hepatic cysts, including a large 13.7 cm cyst anteriorly to the left lobe. If the patient has discomfort in this region, consider interventional radiology consultation for aspiration and possible sclerotherapy. ) Tests in the medicine section of CPT®: ordered and reviewed Decide to obtain previous medical records or to obtain history from someone other than the patient: yes Discuss the patient with other providers: yes (Consulting the hospitalist service for further management.) Independent visualization of images, tracings, or specimens: yes (Personally reviewed the patient's x-ray) Risk of Complications, Morbidity, and/or Mortality Presenting problems: high Diagnostic procedures: high Management options: high General comments: I personally reviewed the patient's vital signs, laboratory tests, and/or radiological findings. I discussed these findings with the patient and their significance. I answered all questions and explained that given these findings there is significant concern for increased morbidity and/or mortality without immediate intervention. As a result, I recommended admission to the hospital, consulted the appropriate service, and transitioned care to that service in improved condition Patient Progress Patient progress: improved ED Course as of Jun 20 0233 Sat Jun 20, 2020  
0000 Patient's EKG was performed shows A. fib with a rate of 100. No acute ischemic change. [JS] 0890 Patient's creatinine is about twice are normal.  Treating with fluids. Treating the patient's hypertension and heart rate with labetalol. [JS] 7635 Reevaluated the patient she states she is feeling better. I am turning the patient supplemental oxygen off and will see what her O2 sats stabilized at  
 [JS] 1951 Patient's oxygen dropped to 86% on room air. She has no O2 at home. This is completely at rest.  
 [JS] 7452 Consult to the hospitalist service for further management. [JS] ED Course User Index [JS] Adiel Marte MD  
 
 
Procedures

## 2020-06-20 NOTE — ED NOTES
TRANSFER - OUT REPORT: 
 
Verbal report given to [facility]  being transferred to [facility] for routine progression of care Report consisted of patients Situation, Background, Assessment and  
Recommendations(SBAR). Information from the following report(s) SBAR, ED Summary and MAR was reviewed with the receiving nurse. Lines:  
Peripheral IV 06/19/20 Right Hand (Active) Opportunity for questions and clarification was provided. Patient transported with: 
 O2 @ 3 liters Tech

## 2020-06-21 PROBLEM — J96.01 ACUTE HYPOXEMIC RESPIRATORY FAILURE (HCC): Status: ACTIVE | Noted: 2020-01-01

## 2020-06-21 NOTE — PROGRESS NOTES
Problem: Falls - Risk of 
Goal: *Absence of Falls Description: Document Clydene Bone Fall Risk and appropriate interventions in the flowsheet. Outcome: Progressing Towards Goal 
Note: Fall Risk Interventions: 
Mobility Interventions: Patient to call before getting OOB Medication Interventions: Patient to call before getting OOB, Teach patient to arise slowly Elimination Interventions: Call light in reach, Patient to call for help with toileting needs Problem: Patient Education: Go to Patient Education Activity Goal: Patient/Family Education Outcome: Progressing Towards Goal

## 2020-06-21 NOTE — PROGRESS NOTES
Hospitalist Progress Note Admit Date:  2020 10:58 PM  
Name:  Everette Geroniom Age:  78 y.o. 
:  1950 MRN:  [id number] PCP:  Deepti Hollingsworth MD 
Treatment Team: Attending Provider: Jin Guerra MD; Utilization Review: Nidia Benites RN; Consulting Provider: Pan Olivares MD; Consulting Provider: Patrick Ash MD 
 
Subjective: The patient is a 71-year-old  lady with HTN, CHF with unknown EF, bronchiectasis, chronic A. fib on Xarelto, was admitted early this morning for hypertensive urgency and acute hypoxemic respiratory failure. She had some respiratory distress early this morning and an ABG was obtained on 6 L per nasal cannula, revealing a pH of 7.34, PCO2 54, PO2 74, bicarbonate 29, O2 sat 93%. Her heart rate was also anywhere between 100 and the 120s. She actually denies any significant palpitations. Her shortness of breath has improved since she has been on oxygen. Pulmonary and Cardiology consultations were obtained. Objective:  
 
Patient Vitals for the past 24 hrs: 
 Temp Pulse Resp BP SpO2  
20  (!) 111     
20 2119  (!) 122     
20 2115     96 %  
20 2044 98.1 °F (36.7 °C) (!) 117 18 133/78 92 %  
20 1945     96 %  
20 1609 97.4 °F (36.3 °C) 91 18 126/79 100 % 20 1603     96 %  
20 1600  (!) 101     
20 1200  100     
20 1132 97.6 °F (36.4 °C) 98 18 149/69 96 %  
20 0800  (!) 101     
20 0741 98.1 °F (36.7 °C) (!) 102 18 (!) 141/102 92 %  
20 0450 98.6 °F (37 °C) 100 19 (!) 154/95 92 %  
20 0352 98.2 °F (36.8 °C) 90 18 (!) 158/93 97 % 20 0253  95  (!) 183/93 94 % 20 0222    173/88 91 %  
20 0152  97  (!) 172/103 94 % 20 0150     94 % 20 0122  98  (!) 158/107 98 %  
20 0022  93  (!) 182/103   
20 0007  91  161/89 98 % 06/19/20 2312 98.2 °F (36.8 °C) (!) 101 18 (!) 195/104 (!) 74 % Oxygen Therapy O2 Sat (%): 96 % (06/20/20 2115) Pulse via Oximetry: 94 beats per minute (06/20/20 1945) O2 Device: Hi flow nasal cannula;Humidifier (06/20/20 2115) O2 Flow Rate (L/min): 3 l/min (06/20/20 2115) Intake/Output Summary (Last 24 hours) at 6/20/2020 2139 Last data filed at 6/20/2020 1700 Gross per 24 hour Intake 1000 ml Output 300 ml Net 700 ml General:    Well nourished. Alert. CV:   Normal S1-S2, irregularly irregular, intermittently tachycardic. No murmur, rub, or gallop. Lungs:   Diminished breath sounds diffusely, with mild bibasilar crackles. No wheezing or rhonchi. Abdomen:   Soft, nontender, nondistended. Extremities: Warm and dry. No cyanosis. 1+ pitting edema bilaterally in the lower extremities. Skin:     No rashes or jaundice. Data Review: 
I have reviewed all labs, meds, telemetry events, and studies from the last 24 hours. Recent Results (from the past 24 hour(s)) EKG, 12 LEAD, INITIAL Collection Time: 06/19/20 11:06 PM  
Result Value Ref Range Ventricular Rate 93 BPM  
 Atrial Rate 250 BPM  
 QRS Duration 72 ms Q-T Interval 342 ms QTC Calculation (Bezet) 425 ms Calculated R Axis 28 degrees Calculated T Axis -24 degrees Diagnosis    
  !! AGE AND GENDER SPECIFIC ECG ANALYSIS !! Atrial fibrillation Low voltage QRS Cannot rule out Anterior infarct (cited on or before 04-MAY-2020) Abnormal ECG When compared with ECG of 04-MAY-2020 18:17, 
Nonspecific T wave abnormality, improved in Lateral leads Confirmed by Anushka Rader ([id number]) on 6/20/2020 8:10:34 AM 
  
CBC WITH AUTOMATED DIFF Collection Time: 06/19/20 11:15 PM  
Result Value Ref Range WBC 9.1 4.3 - 11.1 K/uL  
 RBC 4.61 4.05 - 5.2 M/uL  
 HGB 11.2 (L) 11.7 - 15.4 g/dL HCT 37.8 35.8 - 46.3 % MCV 82.0 79.6 - 97.8 FL  
 MCH 24.3 (L) 26.1 - 32.9 PG  
 MCHC 29.6 (L) 31.4 - 35.0 g/dL RDW 17.5 (H) 11.9 - 14.6 % PLATELET 587 058 - 635 K/uL MPV 10.5 9.4 - 12.3 FL ABSOLUTE NRBC 0.00 0.0 - 0.2 K/uL  
 DF AUTOMATED NEUTROPHILS 74 43 - 78 % LYMPHOCYTES 16 13 - 44 % MONOCYTES 7 4.0 - 12.0 % EOSINOPHILS 3 0.5 - 7.8 % BASOPHILS 0 0.0 - 2.0 % IMMATURE GRANULOCYTES 0 0.0 - 5.0 %  
 ABS. NEUTROPHILS 6.8 1.7 - 8.2 K/UL  
 ABS. LYMPHOCYTES 1.4 0.5 - 4.6 K/UL  
 ABS. MONOCYTES 0.6 0.1 - 1.3 K/UL  
 ABS. EOSINOPHILS 0.2 0.0 - 0.8 K/UL  
 ABS. BASOPHILS 0.0 0.0 - 0.2 K/UL  
 ABS. IMM. GRANS. 0.0 0.0 - 0.5 K/UL METABOLIC PANEL, COMPREHENSIVE Collection Time: 06/19/20 11:15 PM  
Result Value Ref Range Sodium 140 136 - 145 mmol/L Potassium 3.7 3.5 - 5.1 mmol/L Chloride 102 98 - 107 mmol/L  
 CO2 32 21 - 32 mmol/L Anion gap 6 (L) 7 - 16 mmol/L Glucose 104 (H) 65 - 100 mg/dL BUN 22 8 - 23 MG/DL Creatinine 1.12 (H) 0.6 - 1.0 MG/DL  
 GFR est AA >60 >60 ml/min/1.73m2 GFR est non-AA 51 (L) >60 ml/min/1.73m2 Calcium 8.7 8.3 - 10.4 MG/DL Bilirubin, total 1.4 (H) 0.2 - 1.1 MG/DL  
 ALT (SGPT) 27 12 - 65 U/L  
 AST (SGOT) 40 (H) 15 - 37 U/L Alk. phosphatase 72 50 - 136 U/L Protein, total 6.4 6.3 - 8.2 g/dL Albumin 3.0 (L) 3.2 - 4.6 g/dL Globulin 3.4 2.3 - 3.5 g/dL A-G Ratio 0.9 (L) 1.2 - 3.5 PROCALCITONIN Collection Time: 06/20/20  5:02 AM  
Result Value Ref Range Procalcitonin 0.06 ng/mL TROPONIN-HIGH SENSITIVITY Collection Time: 06/20/20  5:02 AM  
Result Value Ref Range Troponin-High Sensitivity 6.7 0 - 14 pg/mL URINALYSIS W/ RFLX MICROSCOPIC Collection Time: 06/20/20  5:16 AM  
Result Value Ref Range Color SHANTE Appearance CLEAR Specific gravity 1.042 (H) 1.001 - 1.023    
 pH (UA) 5.5 5.0 - 9.0 Protein 30 (A) NEG mg/dL Glucose Negative mg/dL Ketone Negative NEG mg/dL Bilirubin Negative NEG Blood Negative NEG Urobilinogen 1.0 0.2 - 1.0 EU/dL  Nitrites Negative NEG    
 Leukocyte Esterase Negative NEG    
 WBC 5-10 0 /hpf  
 RBC 0-3 0 /hpf Epithelial cells 3-5 0 /hpf Bacteria 0 0 /hpf Casts 3-5 0 /lpf POC G3 Collection Time: 06/20/20  8:27 AM  
Result Value Ref Range Device: NASAL CANNULA pH (POC) 7.339 (L) 7.35 - 7.45    
 pCO2 (POC) 54.2 (H) 35 - 45 MMHG  
 pO2 (POC) 74 (L) 75 - 100 MMHG  
 HCO3 (POC) 29.2 (H) 22 - 26 MMOL/L  
 sO2 (POC) 93 (L) 95 - 98 % Base excess (POC) 2 mmol/L Allens test (POC) YES Site RIGHT RADIAL Specimen type (POC) ARTERIAL Performed by WallaceMicheleRT   
 CO2, POC 31 MMOL/L Flow rate (POC) 6.000 L/min Respiratory comment: NurseNotified COLLECT TIME 826 All Micro Results Procedure Component Value Units Date/Time CULTURE, RESPIRATORY/SPUTUM/BRONCH Arch Pickles STAIN [[id number]] Order Status:  Sent Specimen:  Sputum Current Meds: 
Current Facility-Administered Medications Medication Dose Route Frequency  aspirin chewable tablet 81 mg  81 mg Oral DAILY  atenoloL (TENORMIN) tablet 50 mg  50 mg Oral DAILY  losartan (COZAAR) tablet 50 mg  50 mg Oral DAILY  sodium chloride (NS) flush 5-40 mL  5-40 mL IntraVENous Q8H  
 sodium chloride (NS) flush 5-40 mL  5-40 mL IntraVENous PRN  
 acetaminophen (TYLENOL) tablet 650 mg  650 mg Oral Q4H PRN  
 ondansetron (ZOFRAN ODT) tablet 4 mg  4 mg Oral Q4H PRN  
 senna-docusate (PERICOLACE) 8.6-50 mg per tablet 1 Tab  1 Tab Oral BID PRN  
 enoxaparin (LOVENOX) injection 40 mg  40 mg SubCUTAneous Q24H  
 [START ON 6/21/2020] azithromycin (ZITHROMAX) 500 mg in 0.9% sodium chloride (MBP/ADV) 250 mL  500 mg IntraVENous Q24H  
 guaiFENesin-codeine (ROBITUSSIN AC) 100-10 mg/5 mL solution 5 mL  5 mL Oral Q4H PRN  
 hydrALAZINE (APRESOLINE) 20 mg/mL injection 10 mg  10 mg IntraVENous Q4H PRN  
 dilTIAZem IR (CARDIZEM) tablet 30 mg  30 mg Oral Q6H  
 sodium chloride 3% hypertonic nebulizer soln  4 mL Nebulization BID RT  
  albuterol-ipratropium (DUO-NEB) 2.5 MG-0.5 MG/3 ML  3 mL Nebulization QID RT  
 guaiFENesin-dextromethorphan (ROBITUSSIN DM) 100-10 mg/5 mL syrup 10 mL  10 mL Oral TID  [START ON 6/21/2020] furosemide (LASIX) tablet 40 mg  40 mg Oral DAILY Other Studies (last 24 hours): Xr Chest Pa Lat Result Date: 6/19/2020 EXAM: Chest x-ray. INDICATION: Hypoxia. COMPARISON: Prior chest x-ray on May 4, 2020. TECHNIQUE: Frontal and lateral views of the chest were obtained. FINDINGS: Cardiomegaly is unchanged. The lungs are clear. No pneumothorax, vascular congestion or pleural effusion is seen. IMPRESSION: 1. No acute process. 2. Unchanged cardiomegaly. Ct Head Wo Cont Result Date: 6/19/2020 EXAM: Noncontrast CT head. INDICATION: Dizziness. COMPARISON: None. TECHNIQUE: Noncontrast CT images of the head were obtained. Radiation dose reduction techniques were used for this study. Our CT scanners use one or all of the following:  Automated exposure control, adjustment of the mA or kV according to patient size, iterative reconstruction. FINDINGS: Brain volume is appropriate for age. No acute infarct, hemorrhage or mass is identified. There is no mass effect, midline shift or depressed fracture. The visualized paranasal sinuses and mastoid air cells are clear. IMPRESSION: No acute process. Assessment and Plan:  
 
Hospital Problems as of 6/20/2020 Never Reviewed Codes Class Noted - Resolved POA * (Principal) Hypertensive urgency ICD-10-CM: I16.0 ICD-9-CM: 401.9  6/20/2020 - Present Unknown Atrial fibrillation [facility]) ICD-10-CM: I48.91 
ICD-9-CM: 427.31  6/20/2020 - Present Unknown Hypoxia ICD-10-CM: R09.02 
ICD-9-CM: 799.02  6/20/2020 - Present Unknown Acute respiratory failure with hypoxia and hypercarbia (HCC) ICD-10-CM: J96.01, J96.02 
ICD-9-CM: 518.81  6/20/2020 - Present Unknown  Edema due to hypervolemia ICD-10-CM: R60.9, E87.70 
 ICD-9-CM: 782.3, 276.69  6/20/2020 - Present Yes Suspected sleep apnea ICD-10-CM: R29.818 ICD-9-CM: 781.99  6/20/2020 - Present Yes Bronchiectasis ([address] ICD-10-CM: J47.9 ICD-9-CM: 494.0  5/5/2020 - Present Yes 1 - uncontrolled hypertension 2 - acute hypoxemic respiratory failure 3 - A. fib with intermittent RVR, on anticoagulation with Xarelto 4 - bronchiectasis 5 - CHF with unknown EF 
6 - suspected JEANNE PLAN:   
· Resume home antihypertensive medications · IV hydralazine as needed for SBP greater than 160 or DBP greater than 100 
· Continue to provide oxygen supplementation and titrate as clinically appropriate, with goal oxygen saturation of 90 to 92% · Start oral Cardizem for better rate control of A. Fib · Obtain Cardiology and Pulmonary consultations. Their input is appreciated. · The patient will need an echocardiogram.  Sleep study should also be obtained as an outpatient. DC planning/Dispo: Home in 48 to 72 hours pending clinical improvement DVT ppx: We will continue Xarelto Signed: Cheryl Langley MD

## 2020-06-21 NOTE — PROGRESS NOTES
END OF SHIFT NOTE: 
 
INTAKE/OUTPUT 
06/20 0701 - 06/21 0700 In: -  
Out: 500 [Urine:500] Voiding: YES Catheter: NO 
Drain:   
 
 
 
 
 
Flatus: Patient does have flatus present. Stool:  0 occurrences. Characteristics: 
  
 
Emesis: 0 occurrences. Characteristics: VITAL SIGNS Patient Vitals for the past 12 hrs: 
 Temp Pulse Resp BP SpO2  
06/21/20 1559     92 %  
06/21/20 1506 97.9 °F (36.6 °C) 98 18 107/52 94 % 06/21/20 1159     99 % 06/21/20 1124 97.5 °F (36.4 °C) 83 18 105/68 96 %  
06/21/20 0710     97 % 06/21/20 0653 98.2 °F (36.8 °C) 95 20 114/72 92 % Pain Assessment Pain Intensity 1: 0 (06/21/20 0721) Pain Location 1: Head 
Pain Intervention(s) 1: Medication (see MAR) Patient Stated Pain Goal: 3 Ambulating Yes Shift report given to oncoming nurse at the bedside.  
 
Zak Cardozo, RN

## 2020-06-21 NOTE — PROGRESS NOTES
Hospitalist Progress Note Admit Date:  2020 10:58 PM  
Name:  Rafita Laurent Age:  78 y.o. 
:  1950 MRN:  [id number] PCP:  Anna Ryder MD 
Treatment Team: Attending Provider: Melanie Valdivia MD; Utilization Review: Norma Adams RN; Consulting Provider: Sam Bullock MD; Consulting Provider: Junior Harris MD 
 
Subjective: The patient is a 70-year-old  lady with HTN, CHF with preserved EF, bronchiectasis, chronic A. fib on Xarelto, was admitted for hypertensive urgency and acute hypoxemic respiratory failure. She had some respiratory distress yesterday morning and an ABG was obtained on 6 L per nasal cannula, revealing a pH of 7.34, PCO2 54, PO2 74, bicarbonate 29, O2 sat 93%. Her heart rate was also anywhere between 100 and the 120s. She actually denies any significant palpitations. Her shortness of breath has improved since she has been on oxygen. Pulmonary and Cardiology consultations were obtained. She feels better today, still requires oxygen supplementation, had overnight oximetry last night with desaturations as low as 66%, strongly suggestive of JEANNE. She remains in A Fib, but her rate is now better controlled. Objective:  
 
Patient Vitals for the past 24 hrs: 
 Temp Pulse Resp BP SpO2  
20 1559     92 %  
20 1506 97.9 °F (36.6 °C) 98 18 107/52 94 % 20 1159     99 % 20 1124 97.5 °F (36.4 °C) 83 18 105/68 96 %  
20 0710     97 % 20 0653 98.2 °F (36.8 °C) 95 20 114/72 92 %  
20 0343  (!) 103   99 % 20 0330 98.1 °F (36.7 °C) (!) 106 18 117/83 100 % 20 0156  (!) 104   99 % 20 0131  (!) 109   95 % 20 0003 98.2 °F (36.8 °C) (!) 125 18 159/89 97 % 20 2312  (!) 128     
20 2259  (!) 107   100 % 207  (!) 111     
20  (!) 122     
20     96 % 06/20/20 2044 98.1 °F (36.7 °C) (!) 117 18 133/78 92 %  
06/20/20 1945     96 % Oxygen Therapy O2 Sat (%): 92 % (06/21/20 1559) Pulse via Oximetry: 90 beats per minute (06/21/20 1559) O2 Device: Nasal cannula (06/21/20 1559) O2 Flow Rate (L/min): 3 l/min (06/21/20 1559) Intake/Output Summary (Last 24 hours) at 6/21/2020 1904 Last data filed at 6/21/2020 0233 Gross per 24 hour Intake  Output 1000 ml Net -1000 ml General:    Well nourished. Alert. CV:   Normal S1-S2, irregularly irregular. No murmur, rub, or gallop. Lungs:   Diminished breath sounds diffusely. No wheezing, rhonchi, or rales. Abdomen:   Soft, nontender, nondistended. Extremities: Warm and dry. No cyanosis. 1+ pitting edema bilaterally in the lower extremities. Skin:     No rashes or jaundice. Data Review: 
I have reviewed all labs, meds, telemetry events, and studies from the last 24 hours. Recent Results (from the past 24 hour(s)) METABOLIC PANEL, BASIC Collection Time: 06/21/20  4:10 AM  
Result Value Ref Range Sodium 141 136 - 145 mmol/L Potassium 3.4 (L) 3.5 - 5.1 mmol/L Chloride 102 98 - 107 mmol/L  
 CO2 31 21 - 32 mmol/L Anion gap 8 7 - 16 mmol/L Glucose 115 (H) 65 - 100 mg/dL BUN 19 8 - 23 MG/DL Creatinine 0.91 0.6 - 1.0 MG/DL  
 GFR est AA >60 >60 ml/min/1.73m2 GFR est non-AA >60 >60 ml/min/1.73m2 Calcium 8.2 (L) 8.3 - 10.4 MG/DL  
CBC W/O DIFF Collection Time: 06/21/20  4:10 AM  
Result Value Ref Range WBC 10.4 4.3 - 11.1 K/uL  
 RBC 4.46 4.05 - 5.2 M/uL  
 HGB 10.9 (L) 11.7 - 15.4 g/dL HCT 37.2 35.8 - 46.3 % MCV 83.4 79.6 - 97.8 FL  
 MCH 24.4 (L) 26.1 - 32.9 PG  
 MCHC 29.3 (L) 31.4 - 35.0 g/dL  
 RDW 17.4 (H) 11.9 - 14.6 % PLATELET 963 253 - 046 K/uL MPV 10.7 9.4 - 12.3 FL ABSOLUTE NRBC 0.00 0.0 - 0.2 K/uL All Micro Results Procedure Component Value Units Date/Time CULTURE, RESPIRATORY/SPUTUM/BRONCH Melo Saad STAIN [[id number]] Order Status:  Sent Specimen:  Sputum Current Meds: 
Current Facility-Administered Medications Medication Dose Route Frequency  aspirin chewable tablet 81 mg  81 mg Oral DAILY  atenoloL (TENORMIN) tablet 50 mg  50 mg Oral DAILY  losartan (COZAAR) tablet 50 mg  50 mg Oral DAILY  sodium chloride (NS) flush 5-40 mL  5-40 mL IntraVENous Q8H  
 sodium chloride (NS) flush 5-40 mL  5-40 mL IntraVENous PRN  
 acetaminophen (TYLENOL) tablet 650 mg  650 mg Oral Q4H PRN  
 ondansetron (ZOFRAN ODT) tablet 4 mg  4 mg Oral Q4H PRN  
 senna-docusate (PERICOLACE) 8.6-50 mg per tablet 1 Tab  1 Tab Oral BID PRN  
 azithromycin (ZITHROMAX) 500 mg in 0.9% sodium chloride (MBP/ADV) 250 mL  500 mg IntraVENous Q24H  
 guaiFENesin-codeine (ROBITUSSIN AC) 100-10 mg/5 mL solution 5 mL  5 mL Oral Q4H PRN  
 hydrALAZINE (APRESOLINE) 20 mg/mL injection 10 mg  10 mg IntraVENous Q4H PRN  
 dilTIAZem IR (CARDIZEM) tablet 30 mg  30 mg Oral Q6H  
 sodium chloride 3% hypertonic nebulizer soln  4 mL Nebulization BID RT  
 albuterol-ipratropium (DUO-NEB) 2.5 MG-0.5 MG/3 ML  3 mL Nebulization QID RT  
 guaiFENesin-dextromethorphan (ROBITUSSIN DM) 100-10 mg/5 mL syrup 10 mL  10 mL Oral TID  furosemide (LASIX) tablet 40 mg  40 mg Oral DAILY  rivaroxaban (XARELTO) tablet 20 mg  20 mg Oral DAILY Other Studies (last 24 hours): No results found. Assessment and Plan:  
 
Hospital Problems as of 6/21/2020 Never Reviewed Codes Class Noted - Resolved POA Acute hypoxemic respiratory failure ([facility] [address] ICD-10-CM: J96.01 
ICD-9-CM: 518.81  6/21/2020 - Present Unknown * (Principal) Hypertensive urgency ICD-10-CM: I16.0 ICD-9-CM: 401.9  6/20/2020 - Present Unknown Atrial fibrillation [facility]) ICD-10-CM: I48.91 
ICD-9-CM: 427.31  6/20/2020 - Present Unknown  Hypoxia ICD-10-CM: R09.02 
 ICD-9-CM: 799.02  6/20/2020 - Present Unknown Acute respiratory failure with hypoxia and hypercarbia (HCC) ICD-10-CM: J96.01, J96.02 
ICD-9-CM: 518.81  6/20/2020 - Present Unknown Edema due to hypervolemia ICD-10-CM: R60.9, E87.70 ICD-9-CM: 782.3, 276.69  6/20/2020 - Present Yes Suspected sleep apnea ICD-10-CM: R29.818 ICD-9-CM: 781.99  6/20/2020 - Present Yes Bronchiectasis ([address] ICD-10-CM: J47.9 ICD-9-CM: 494.0  5/5/2020 - Present Yes 1 - Hypertension, better controlled 2 - acute hypoxemic respiratory failure 3 - Chronic A. fib on anticoagulation with Xarelto 4 - bronchiectasis 5 - Chronic diastolic CHF with preserved EF of 50-55% 6 - suspected JEANNE 
  
PLAN:   
· Continue home antihypertensive medications · IV hydralazine as needed for SBP greater than 160 or DBP greater than 100 
· Continue to provide oxygen supplementation and titrate as clinically appropriate, with goal oxygen saturation of 90 to 92% · Continue oral Cardizem for better rate control of A. Fib · Obtain Cardiology and Pulmonary consultations. Their input is appreciated. · The patient had an echocardiogram revealing EF of 50-55% with enlarged RV. ·  Sleep study should also be obtained as an outpatient, as overnight oximetry noted desaturations as low as 66%, which is strongly s/o JEANNE. 
  
DC planning/Dispo: Home in 48 to 72 hours pending clinical improvement DVT ppx: We will continue Xarelto Signed: Samantha Fry MD

## 2020-06-21 NOTE — PROGRESS NOTES
6/21/2020 9:25 AM 
 
Admit Date: 6/19/2020 Subjective:  
 
Blanchie Mcburney denies chest pain, palpitations, paroxysmal nocturnal dyspnea. stable night Objective:  
  
Visit Vitals /72 Pulse 95 Temp 98.2 °F (36.8 °C) Resp 20 Ht 5' 2\" (1.575 m) Wt 271 lb 4.8 oz (123.1 kg) SpO2 97% BMI 49.62 kg/m² Physical Exam: 
Heart: irregularly irregular rhythm Lungs: clear to auscultation bilaterally Abdomen: soft, non-tender. Bowel sounds normal. No masses,  no organomegaly Data Review:  
Labs:   
Recent Results (from the past 24 hour(s)) METABOLIC PANEL, BASIC Collection Time: 06/21/20  4:10 AM  
Result Value Ref Range Sodium 141 136 - 145 mmol/L Potassium 3.4 (L) 3.5 - 5.1 mmol/L Chloride 102 98 - 107 mmol/L  
 CO2 31 21 - 32 mmol/L Anion gap 8 7 - 16 mmol/L Glucose 115 (H) 65 - 100 mg/dL BUN 19 8 - 23 MG/DL Creatinine 0.91 0.6 - 1.0 MG/DL  
 GFR est AA >60 >60 ml/min/1.73m2 GFR est non-AA >60 >60 ml/min/1.73m2 Calcium 8.2 (L) 8.3 - 10.4 MG/DL  
CBC W/O DIFF Collection Time: 06/21/20  4:10 AM  
Result Value Ref Range WBC 10.4 4.3 - 11.1 K/uL  
 RBC 4.46 4.05 - 5.2 M/uL  
 HGB 10.9 (L) 11.7 - 15.4 g/dL HCT 37.2 35.8 - 46.3 % MCV 83.4 79.6 - 97.8 FL  
 MCH 24.4 (L) 26.1 - 32.9 PG  
 MCHC 29.3 (L) 31.4 - 35.0 g/dL  
 RDW 17.4 (H) 11.9 - 14.6 % PLATELET 639 579 - 063 K/uL MPV 10.7 9.4 - 12.3 FL ABSOLUTE NRBC 0.00 0.0 - 0.2 K/uL Telemetry: AFIB 
 
ECHO:nl EF enlarged RV Assessment:  
 
Patient Active Problem List  
 Diagnosis Date Noted  Hypertensive urgency BP is better 06/20/2020  Atrial fibrillation ([facility] [address] conrtolled 06/20/2020  Hypoxia 06/20/2020  Acute respiratory failure with hypoxia and hypercarbia (HCC) co2 > 50 needs sleep study 06/20/2020  Edema due to hypervolemia 06/20/2020  Suspected sleep apnea 06/20/2020  Suspected COVID-19 virus infection 05/05/2020  Bronchiectasis ([facility] [address] 05/05/2020  Hypertension 05/05/2020  Pneumonia 05/04/2020 Plan:  
Continue rate control and xarelto

## 2020-06-21 NOTE — PROGRESS NOTES
[facility] Admission Date: 6/19/2020 Daily Progress Note: 6/21/2020 Patient is a 78 y.o. female presented to the ER with dizziness. She has a PMHx of HTN, CHF, and bronchiectasis. She was placed on Xarelto 8 days ago, by her PCP for apparent A fib. The patient states that a physician mentioned that she was going in and out of A Fib upon her last admission in May, but she was not seen by cardiology at that time, nor did she had any workup outpatient. She started with dizzy spells two days after initiating therapy. Initially the dizziness was only during coughing spells, but it has continued to worsen. She was transported to the ER via EMS, where she was also noted to be hypertensive with SBPs in the 180s DBPs low 90s. Per the patient she runs typically with SBP in the 120s when she is seen at the clinic. She was also hypoxic with an O2 sat of 74% on RA upon admission. She has SOB and cough at baseline from her bronchiolectasis, but does not wear home O2. She denies ever being a smoker. Uses albuterol at home PRN. Subjective:  
HOLDEN completed last night, completed on 9 LPM via HFNC. Still with significant decreases in O2. Review of Systems Constitutional: negative for fevers, chills, sweats, anorexia and weight loss Eyes: negative for visual disturbance, irritation and redness Ears, nose, mouth, throat, and face: positive for snoring, negative for tinnitus, ear drainage, nasal congestion and epistaxis Respiratory: positive for cough, sputum, wheezing, dyspnea on exertion or bronchiectasis Cardiovascular: positive for dyspnea, palpitations, irregular heart beats, lower extremity edema Gastrointestinal: negative for vomiting, diarrhea and constipation Genitourinary:negative for dysuria and urinary incontinence Integument/breast: negative for rash and dryness Hematologic/lymphatic: positive for easy bruising, negative for blood in stool or urine and coughing up blood Musculoskeletal:positive for back pain Neurological: negative Behavioral/Psych: negative for anxiety and depression Current Facility-Administered Medications Medication Dose Route Frequency  aspirin chewable tablet 81 mg  81 mg Oral DAILY  atenoloL (TENORMIN) tablet 50 mg  50 mg Oral DAILY  losartan (COZAAR) tablet 50 mg  50 mg Oral DAILY  sodium chloride (NS) flush 5-40 mL  5-40 mL IntraVENous Q8H  
 sodium chloride (NS) flush 5-40 mL  5-40 mL IntraVENous PRN  
 acetaminophen (TYLENOL) tablet 650 mg  650 mg Oral Q4H PRN  
 ondansetron (ZOFRAN ODT) tablet 4 mg  4 mg Oral Q4H PRN  
 senna-docusate (PERICOLACE) 8.6-50 mg per tablet 1 Tab  1 Tab Oral BID PRN  
 azithromycin (ZITHROMAX) 500 mg in 0.9% sodium chloride (MBP/ADV) 250 mL  500 mg IntraVENous Q24H  
 guaiFENesin-codeine (ROBITUSSIN AC) 100-10 mg/5 mL solution 5 mL  5 mL Oral Q4H PRN  
 hydrALAZINE (APRESOLINE) 20 mg/mL injection 10 mg  10 mg IntraVENous Q4H PRN  
 dilTIAZem IR (CARDIZEM) tablet 30 mg  30 mg Oral Q6H  
 sodium chloride 3% hypertonic nebulizer soln  4 mL Nebulization BID RT  
 albuterol-ipratropium (DUO-NEB) 2.5 MG-0.5 MG/3 ML  3 mL Nebulization QID RT  
 guaiFENesin-dextromethorphan (ROBITUSSIN DM) 100-10 mg/5 mL syrup 10 mL  10 mL Oral TID  furosemide (LASIX) tablet 40 mg  40 mg Oral DAILY  rivaroxaban (XARELTO) tablet 20 mg  20 mg Oral DAILY Objective:  
 
Vitals:  
 06/21/20 0131 06/21/20 2943 06/21/20 0330 06/21/20 4353 BP:   117/83 Pulse: (!) 109 (!) 104 (!) 106 (!) 103 Resp:   18 Temp:   98.1 °F (36.7 °C) SpO2: 95% 99% 100% 99% Weight:      
Height:      
 
Intake and Output:  
06/19 0701 - 06/20 1900 In: 1000 [I.V.:1000] Out: 300 [Urine:300] 06/20 1901 - 06/21 0700 In: -  
Out: 400 [Urine:400] Intake/Output Summary (Last 24 hours) at 6/21/2020 6133 Last data filed at 6/21/2020 1915 Gross per 24 hour Intake  Output 500 ml Net -500 ml Physical Exam:         
 
GEN: well developed and in no acute distress, HEENT:  PERRL, EOMI, no alar flaring or epistaxis, oral mucosa moist without cyanosis, NECK:  no JVD, no retractions, no thyromegaly or masses, LUNGS:  Scattered expiratory wheezes HEART:  Irreg, Irreg with no M,G,R; 
ABDOMEN:  soft with no tenderness, positive bowel sounds present EXTREMITIES:  warm with no cyanosis, 1+-2+ BLE pitting edema SKIN:  no jaundice or ecchymosis NEURO:  alert and oriented, grossly non-focal 
 
CHEST XRAY:  
2020 
 
  
 
  
CT:  
2020 IMPRESSION: 1.  Trace left pleural effusion. Nonspecific multistation mediastinal 
adenopathy. 2.  Mosaic pattern of lung attenuation, which could reflect atelectasis or a be 
secondary to inflammation of small airways. 3.  Hepatic cysts, including a large 13.7 cm cyst anteriorly to the left lobe. If the patient has discomfort in this region, consider interventional radiology 
consultation for aspiration and possible sclerotherapy. 
 
  
  
 
 
Echo Results  (Last 48 hours) 20 0000  2D ECHO COMPLETE ADULT (TTE) W OR WO CONTR Final result Narrative: [address] [facility]  
[phone] Transthoracic Echocardiogram  
2D, M-mode, Doppler, and Color Doppler Patient: Manoj Woody  
MR #: [id number] : 1950 Age: 78 years Gender: Female Study date: 2020 Account #: [de-identified] Height: 62 in  
Weight: 259.4 lb  
BSA: 2.14 mï¾² Status:Routine Location: 206 BP: 149/ 69 Allergies: PSEUDOEPHEDRINE Sonographer:  Lenny Branch Group:  [facility] Referring Physician:  Maliha Garcia. Ashley Frost MD [facility] [place] Physician:  Maliha Garcia. Ashley Frost MD [facility] INDICATIONS: A-Fib PROCEDURE: This was a routine study. A transthoracic echocardiogram was  
performed.  The study included complete 2D imaging, M-mode, complete spectral  
 Doppler, and color Doppler. Intravenous contrast (Definity, 1 ml) was  
administered to opacify the left ventricle. Image quality was adequate. LEFT VENTRICLE: Size was normal. Systolic function was normal. Ejection  
fraction was estimated in the range of 50 % to 55 %. There were no regional  
wall motion abnormalities. There was mild concentric hypertrophy. The study   
was  
not technically sufficient to allow evaluation of LV diastolic function. RIGHT VENTRICLE: The ventricle was dilated. Systolic function was normal.  
Estimated peak pressure was in the range of 75-80 mmHg. LEFT ATRIUM: The atrium was moderately dilated. RIGHT ATRIUM: The atrium was moderately to markedly dilated. SYSTEMIC VEINS: IVC: The inferior vena cava was dilated. The respirophasic  
change in diameter was less than 50%. AORTIC VALVE: The valve was structurally normal, tri-commissural. There was   
no  
evidence for stenosis. There was no insufficiency. MITRAL VALVE: Valve structure was normal. There was no evidence for stenosis. There was mild regurgitation. TRICUSPID VALVE: The valve structure was normal. There was no evidence for  
stenosis. There was moderate regurgitation. PULMONIC VALVE: The valve structure was normal. There was no evidence for  
stenosis. There was mild regurgitation. PERICARDIUM: There was no pericardial effusion. AORTA: The root exhibited normal size. SUMMARY:  
   
-  Left ventricle: Systolic function was normal. Ejection fraction was  
estimated in the range of 50 % to 55 %. There were no regional wall motion  
abnormalities. There was mild concentric hypertrophy.  
   
-  Right ventricle: The ventricle was dilated. -  Left atrium: The atrium was moderately dilated. -  Right atrium: The atrium was moderately to markedly dilated. -  Inferior vena cava, hepatic veins: The inferior vena cava was dilated.  The  
 respirophasic change in diameter was less than 50%. -  Mitral valve: There was mild regurgitation.  
   
-  Tricuspid valve: There was moderate regurgitation. SYSTEM MEASUREMENT TABLES  
   
2D mode AoR Diam (2D): 3 cm  
LA Dimension (2D): 5 cm Left Atrium Systolic Volume Index; Method of Disks, Biplane; 2D mode;: 40.2  
ml/m2 IVS/LVPW (2D): 1 IVSd (2D): 1.2 cm LVIDd (2D): 4.4 cm LVIDs (2D): 3.2 cm  
LVOT Area (2D): 3.1 cm2 LVPWd (2D): 1.2 cm RVIDd (2D): 4 cm Unspecified Scan Mode Peak Grad; Mean; Antegrade Flow: 11 mm[Hg] Vmax; Antegrade Flow: 166 cm/s LVOT Diam: 2 cm Peak Grad; Mean; Antegrade Flow: 3 mm[Hg] Vmax; Antegrade Flow: 91.9 cm/s Prepared and signed by Tremayne Cuello MD [facility] Signed 20-Jun-2020 17:06:45 LAB Recent Labs  
  06/21/20 
0410 06/19/20 
2315 WBC 10.4 9.1 HGB 10.9* 11.2* HCT 37.2 37.8  228 Recent Labs  
  06/21/20 
0410 06/19/20 
2315  140  
K 3.4* 3.7  102 CO2 31 32 * 104* BUN 19 22 CREA 0.91 1.12* ABG:   
Lab Results Component Value Date/Time PHI 7.339 (L) 06/20/2020 08:27 AM  
 PCO2I 54.2 (H) 06/20/2020 08:27 AM  
 PO2I 74 (L) 06/20/2020 08:27 AM  
 HCO3I 29.2 (H) 06/20/2020 08:27 AM  
 
 
 
 
Assessment:  
 
Hospital Problems  Never Reviewed Codes Class Noted POA * (Principal) Hypertensive urgency ICD-10-CM: I16.0 ICD-9-CM: 401.9  6/20/2020 Unknown Atrial fibrillation [facility]) ICD-10-CM: I48.91 
ICD-9-CM: 427.31  6/20/2020 Unknown Hypoxia ICD-10-CM: R09.02 
ICD-9-CM: 799.02  6/20/2020 Unknown Acute respiratory failure with hypoxia and hypercarbia (HCC) ICD-10-CM: J96.01, J96.02 
ICD-9-CM: 518.81  6/20/2020 Unknown Edema due to hypervolemia ICD-10-CM: R60.9, E87.70 ICD-9-CM: 782.3, 276.69  6/20/2020 Yes Suspected sleep apnea ICD-10-CM: R29.818 ICD-9-CM: 781.99  6/20/2020 Yes Bronchiectasis ([facility] [address] ICD-10-CM: J47.9 ICD-9-CM: 494.0  5/5/2020 Yes PLAN:  
--A fib--per cardiology 
--wean O2 as tolerated 
--continue zithromax D2 
--cont flutter valve, IS 
--cont nebs 
--reviewed HOLDEN-lowest O2 sat 66%--80-89% 9 min 
--cont empiric CPAP with sleep--discussed with RT 
 
--will need to arrange op PSG and office follow up upon d/c 
--vlad Winslow, EDIS Lungs:  CTA Heart:  RRR with no Murmur/Rubs/Gallops Additional Comments:  HOLDEN is compatible with suspected JEANNE. Need to use CPAP I ordered yesterday and cont. ABX. Wean O2. I have spoken with and examined the patient. I agree with the above assessment and plan as documented. Sana Jimenez MD 
 
 
 
More than 50% of time documented was spent in face-to-face contact with the patient and in the care of the patient on the floor/unit where the patient is located.

## 2020-06-22 NOTE — PROGRESS NOTES
Spoke to Ms. Boswell in room 206 about discharge planning. She lives with her  [place], and is independent with ADLs, including 18 steps from basement to the rest of the house (BMI = 52). She is on CPAP here (BMI = 52), as well as supplemental oxygen. She does not use either on at home. Case Management will follow and assist with discharge planning. Care Management Interventions Transition of Care Consult (CM Consult): Discharge Planning Current Support Network: Own Home, Lives with Spouse

## 2020-06-22 NOTE — PROGRESS NOTES
Sleep Disorder Breathing Screen: 
  
Patient reports symptoms of: · Snoring · Excessive daytime sleepiness · STOP-BAN 
· Bain[place] Score: 10  
· Height: 5'2\" · Weight: 285 lbs · BMI: 52.3 Discussed the physiological detriment and pathways to co-morbid conditions resulting from sleep disordered breathing. In addition, confirmed, with patient, Dr Gloria Shepard desire for patient to undergo polysomnography. Patient was receptive and agreed to undergo home sleep testing. Patient was informed of the necessity of an in-lab polysomnography if she required oxygen at home. Refer patient for sleep study based on above assessment.

## 2020-06-22 NOTE — PROGRESS NOTES
Spoke with lab. Sputum sample needs to be recollected. Informed pt and new specimen container given to pt

## 2020-06-22 NOTE — PROGRESS NOTES
[facility] CARDIOLOGY PROGRESS NOTE 
      
 
6/22/2020 11:21 AM 
 
Admit Date: 6/19/2020 Subjective:  
 
No o/e; in AF with CVR. On 3-4L NC 
 
ROS: 
Cardiovascular:  As noted above Objective:  
  
Vitals:  
 06/22/20 3175 06/22/20 1014 06/22/20 8802 06/22/20 3291 BP:   112/80 Pulse: 97 96 91 Resp:   19 Temp:   97.3 °F (36.3 °C) SpO2: 95% 96% 99% 94% Weight:  285 lb 15 oz (129.7 kg) Height:      
 
 
Physical Exam: 
General-No Acute Distress Neck- supple, no JVD 
CV- irregular rate and rhythm no MRG Lung- min basilar rales Abd- soft, nontender, nondistended Ext- 1+ edema bilaterally; stasis changes. Skin- warm and dry Data Review:  
Recent Labs  
  06/22/20 
0326 06/21/20 
0410  141  
K 3.4* 3.4*  
BUN 20 19 CREA 0.90 0.91  
GLU 93 115* WBC 9.4 10.4 HGB 10.9* 10.9* HCT 37.8 37.2  207 Assessment/Plan:  
 
Principal Problem: Hypertensive urgency (6/20/2020) -Improved Active Problems: 
  Bronchiectasis ([address] (5/5/2020) -Per pulmonology Atrial fibrillation ([facility] [address] (6/20/2020) -Persistent; uncertain duration but noted on admission EKG from 5/4/20; currently in CVR. Continue rate control strategy; less likely to achieve rhythm control with morbid obesity/uncontrolled JEANNE/biatrial enlargement. Can reassess rhythm control options as outpt if needed 
-Change atenolol to lopressor; stop cardizem with some issues with edema. Titrate up lopressor as needed. 
-On xarelto Hypoxia (6/20/2020) -per pulm; likely secondary to OHS/JEANNE -likely contributing to severely elevated RVSP 
-workup per pulm Acute respiratory failure with hypoxia and hypercarbia ([address] (6/20/2020) -see above; likely OHS/JEANNE Edema due to hypervolemia (6/20/2020) -multifactorial with likely component of venous insuff/hypoalbuminemia/pulm HTN Suspected sleep apnea (6/20/2020) -see above Acute hypoxemic respiratory failure ([facility] (6/21/2020) -see above Replete electrolytes Lary Singh MD 
6/22/2020 11:21 AM

## 2020-06-22 NOTE — PROGRESS NOTES
Hospitalist Progress Note Admit Date:  2020 10:58 PM  
Name:  Xochitl Velasco Age:  78 y.o. 
:  1950 MRN:  [id number] PCP:  Grace Howard MD 
Treatment Team: Attending Provider: Natalya Cordero MD; Utilization Review: Edelmira Newman, CASIE; Consulting Provider: Chantale Paz MD; Consulting Provider: Evi Richardson MD; Utilization Review: Fallon Tracy RN; Care Manager: Blanca Dumont RN; Staff Nurse: Russ Kim RN Subjective:  
 
2020new patient for me today. Patient seen and evaluated. No adverse overnight events noted except she was unable to wear the CPAP machine; states that she is even unable to do it at home; however pulmonology plans to have her attempt again; spoke with her with attempting to wear during the day if she is napping; to try for total of 4 hours overnight Objective:  
 
Patient Vitals for the past 24 hrs: 
 Temp Pulse Resp BP SpO2  
20 1506 98 °F (36.7 °C) (!) 101 19 122/73 92 %  
20 1301     94 % 20 1115 97.9 °F (36.6 °C) 77 18 110/73 94 % 20 0712     94 % 20 0702 97.3 °F (36.3 °C) 91 19 112/80 99 % 20 0610  96   96 %  
20 0213  97   95 % 20 0208  97   (!) 84 %  
20 0044  (!) 103     
20 2300     97 % 20 2236     97 % 20 2232 97.8 °F (36.6 °C) 95 20 118/45 95 % 20  (!) 102   95 % 20 1950 98 °F (36.7 °C) (!) 102 18 136/85 95 % Oxygen Therapy O2 Sat (%): 92 % (20 1506) Pulse via Oximetry: 84 beats per minute (20 1301) O2 Device: Nasal cannula (20 1301) PEEP/CPAP (cm H2O): 3 cm H20 (20 1301) O2 Flow Rate (L/min): 4 l/min (20 0100) Intake/Output Summary (Last 24 hours) at 2020 1851 Last data filed at 2020 1506 Gross per 24 hour Intake  Output 1175 ml Net -1175 ml General:    Well nourished. Alert. CV: RRR. No murmur, rub, or gallop. Lungs:   CTAB. No wheezing, rhonchi, or rales. Abdomen:   Soft, nontender, nondistended. Extremities: Warm and dry. No cyanosis or bilateral pitting edema. Skin:     No rashes or jaundice. Data Review: 
I have reviewed all labs, meds, telemetry events, and studies from the last 24 hours. Recent Results (from the past 24 hour(s)) CULTURE, RESPIRATORY/SPUTUM/BRONCH W GRAM STAIN Collection Time: 06/21/20  8:09 PM  
Result Value Ref Range Special Requests: NO SPECIAL REQUESTS    
 GRAM STAIN     
  GRAM STAIN EXAMINATION OF THIS SPECIMEN INDICATED OROPHARYNGEAL CONTAMINATION. PLEASE SUBMIT A NEW SPECIMEN OR NOTIFY THE MICRO DEPT WITHIN 48HRS IF FURTHER WORKUP IS DESIRED. Culture result: Please reorder and recollect. Culture result: RESULTS VERIFIED, PHONED TO AND READ BACK BY 
Brennan Alaniz RN ON 6/22/20 @ 3819,  
  
METABOLIC PANEL, BASIC Collection Time: 06/22/20  3:26 AM  
Result Value Ref Range Sodium 144 136 - 145 mmol/L Potassium 3.4 (L) 3.5 - 5.1 mmol/L Chloride 104 98 - 107 mmol/L  
 CO2 33 (H) 21 - 32 mmol/L Anion gap 7 7 - 16 mmol/L Glucose 93 65 - 100 mg/dL BUN 20 8 - 23 MG/DL Creatinine 0.90 0.6 - 1.0 MG/DL  
 GFR est AA >60 >60 ml/min/1.73m2 GFR est non-AA >60 >60 ml/min/1.73m2 Calcium 8.2 (L) 8.3 - 10.4 MG/DL  
CBC W/O DIFF Collection Time: 06/22/20  3:26 AM  
Result Value Ref Range WBC 9.4 4.3 - 11.1 K/uL  
 RBC 4.44 4.05 - 5.2 M/uL  
 HGB 10.9 (L) 11.7 - 15.4 g/dL HCT 37.8 35.8 - 46.3 % MCV 85.1 79.6 - 97.8 FL  
 MCH 24.5 (L) 26.1 - 32.9 PG  
 MCHC 28.8 (L) 31.4 - 35.0 g/dL  
 RDW 17.5 (H) 11.9 - 14.6 % PLATELET 454 559 - 118 K/uL MPV 10.4 9.4 - 12.3 FL ABSOLUTE NRBC 0.00 0.0 - 0.2 K/uL All Micro Results Procedure Component Value Units Date/Time CULTURE, RESPIRATORY/SPUTUM/BRONCH Amelia Mac [[id number]] Collected:  06/21/20 2009 Order Status:  Completed Specimen:  Sputum Updated:  06/22/20 1041 Special Requests: NO SPECIAL REQUESTS     
  GRAM STAIN    
  GRAM STAIN EXAMINATION OF THIS SPECIMEN INDICATED OROPHARYNGEAL CONTAMINATION. PLEASE SUBMIT A NEW SPECIMEN OR NOTIFY THE MICRO DEPT WITHIN 48HRS IF FURTHER WORKUP IS DESIRED. Culture result:    
  Please reorder and recollect. RESULTS VERIFIED, PHONED TO AND READ BACK BY 
Makenna Boyer RN ON 6/22/20 @ 1040, TA Current Meds: 
Current Facility-Administered Medications Medication Dose Route Frequency  diphenhydrAMINE (BENADRYL) capsule 25 mg  25 mg Oral QHS PRN  
 metoprolol tartrate (LOPRESSOR) tablet 50 mg  50 mg Oral Q12H  loratadine (CLARITIN) tablet 10 mg  10 mg Oral DAILY PRN  
 aspirin chewable tablet 81 mg  81 mg Oral DAILY  losartan (COZAAR) tablet 50 mg  50 mg Oral DAILY  sodium chloride (NS) flush 5-40 mL  5-40 mL IntraVENous Q8H  
 sodium chloride (NS) flush 5-40 mL  5-40 mL IntraVENous PRN  
 acetaminophen (TYLENOL) tablet 650 mg  650 mg Oral Q4H PRN  
 ondansetron (ZOFRAN ODT) tablet 4 mg  4 mg Oral Q4H PRN  
 senna-docusate (PERICOLACE) 8.6-50 mg per tablet 1 Tab  1 Tab Oral BID PRN  
 azithromycin (ZITHROMAX) 500 mg in 0.9% sodium chloride (MBP/ADV) 250 mL  500 mg IntraVENous Q24H  
 guaiFENesin-codeine (ROBITUSSIN AC) 100-10 mg/5 mL solution 5 mL  5 mL Oral Q4H PRN  
 hydrALAZINE (APRESOLINE) 20 mg/mL injection 10 mg  10 mg IntraVENous Q4H PRN  
 sodium chloride 3% hypertonic nebulizer soln  4 mL Nebulization BID RT  
 albuterol-ipratropium (DUO-NEB) 2.5 MG-0.5 MG/3 ML  3 mL Nebulization QID RT  
 guaiFENesin-dextromethorphan (ROBITUSSIN DM) 100-10 mg/5 mL syrup 10 mL  10 mL Oral TID  furosemide (LASIX) tablet 40 mg  40 mg Oral DAILY  rivaroxaban (XARELTO) tablet 20 mg  20 mg Oral DAILY Other Studies (last 24 hours): No results found. Assessment and Plan: Hospital Problems as of 6/22/2020 Date Reviewed: 6/22/2020 Codes Class Noted - Resolved POA Acute hypoxemic respiratory failure ([facility] [address] ICD-10-CM: J96.01 
ICD-9-CM: 518.81  6/21/2020 - Present Unknown * (Principal) Hypertensive urgency ICD-10-CM: I16.0 ICD-9-CM: 401.9  6/20/2020 - Present Unknown Atrial fibrillation [facility]) ICD-10-CM: I48.91 
ICD-9-CM: 427.31  6/20/2020 - Present Unknown Hypoxia ICD-10-CM: R09.02 
ICD-9-CM: 799.02  6/20/2020 - Present Unknown Acute respiratory failure with hypoxia and hypercarbia (HCC) ICD-10-CM: J96.01, J96.02 
ICD-9-CM: 518.81  6/20/2020 - Present Unknown Edema due to hypervolemia ICD-10-CM: R60.9, E87.70 ICD-9-CM: 782.3, 276.69  6/20/2020 - Present Yes Suspected sleep apnea ICD-10-CM: R29.818 ICD-9-CM: 781.99  6/20/2020 - Present Yes Bronchiectasis ([facility] ICD-10-CM: J47.9 ICD-9-CM: 494.0  5/5/2020 - Present Yes PLAN:   
· Hypertensioncontinue current medications; cardiology following · Acute hypoxemic respiratory failure-resolving; pulmonology input appreciated · OHS/JEANNE-patient to try for CPAP machine again today and tonight · Volume overload and pedal edema-she is currently on p.o. Lasix-I do think she can do with some more diuresis IV she already got a dose this a.m. of p.o. Lasix; will order an a.m. dose in the morning; is giving her diuresis is late in the day will likely interfere with sleep and with her being compliant with the CPAP machine overnight · Chronic A. fib on [address] [place] [address] cardiology · Further work-up and management based on her clinical course DC planning/Dispo: Unclear DVT ppx: Xarelto Signed: 
Claudean Badger, MD

## 2020-06-22 NOTE — PROGRESS NOTES
END OF SHIFT NOTE: 
 
INTAKE/OUTPUT 
06/21 0701 - 06/22 0700 In: -  
Out: [address] Voiding: YES Catheter: NO 
Drain:   
 
 
 
 
 
Flatus: Patient does have flatus present. Stool:  0 occurrences. Characteristics: 
  
 
Emesis: 0 occurrences. Characteristics: VITAL SIGNS Patient Vitals for the past 12 hrs: 
 Temp Pulse Resp BP SpO2  
06/22/20 0712     94 % 06/22/20 0610  96   96 %  
06/22/20 0213  97   95 % 06/22/20 0208  97   (!) 84 %  
06/22/20 0044  (!) 103     
06/21/20 2300     97 % 06/21/20 2236     97 % 06/21/20 2232 97.8 °F (36.6 °C) 95 20 118/45 95 % 06/21/20 2013  (!) 102   95 % 06/21/20 1950 98 °F (36.7 °C) (!) 102 18 136/85 95 % Pain Assessment Pain Intensity 1: 0 (06/21/20 2015) Pain Location 1: Head 
Pain Intervention(s) 1: Medication (see MAR) Patient Stated Pain Goal: 3 Ambulating Yes Shift report given to oncoming nurse at the bedside.  
 
Sandra Roca RN

## 2020-06-22 NOTE — PROGRESS NOTES
Notified RT that pt needed tx. and needed to be fit for CPAP tonight. 0050: Refused CPAP mask, placed on nasal CPAP by RT. 
 
0114: Unable to tolerate nasal CPAP mask, switched to full face mask. Requesting something to help her sleep, hospitalist notified and new order received. 0122: Pt removed new CPAP mask and refused to continue wearing. Administered benadryl and encouraged pt to try to wear for at least 1 hour. Pt agreed, RN placed mask back on pt. 0208: Pt unable to \"get comfortable\" with mask. Attempted to refit and was unsuccessful, and pt now refusing to wear.   Will place back on 3L NC.

## 2020-06-22 NOTE — PROGRESS NOTES
[facility] Admission Date: 6/19/2020 Daily Progress Note: 6/22/2020 The patient's chart is reviewed and the patient is discussed with the staff. Patient is a 76 y. o. female presented to the ER with dizziness.  She has a PMHx of HTN, CHF, and bronchiectasis. She was placed on Xarelto 8 days ago, by her PCP for apparent A fib.  The patient states that a physician mentioned that she was going in and out of A Fib upon her last admission in May, but she was not seen by cardiology at that time, nor did she had any workup outpatient.  She started with dizzy spells two days after initiating therapy.  Initially the dizziness was only during coughing spells, but it has continued to worsen.  She was transported to the ER via EMS, where she was also noted to be hypertensive with SBPs in the 180s DBPs low 90s.  Per the patient she runs typically with SBP in the 120s when she is seen at the clinic. Fifi Hernandez was also hypoxic with an O2 sat of 74% on RA upon admission. She has SOB and cough at baseline from her bronchiectasis, but does not wear home O2. She denies ever being a smoker. Uses albuterol at home PRN. Subjective:  
 
Pt sitting up in the chair on 2L O2. Pt reports that her breathing is better. She did try to wear APAP last night but had issues with the pressure feeling too high and with the mask leaking. Current Facility-Administered Medications Medication Dose Route Frequency  diphenhydrAMINE (BENADRYL) capsule 25 mg  25 mg Oral QHS PRN  
 loratadine (CLARITIN) tablet 10 mg  10 mg Oral DAILY PRN  
 aspirin chewable tablet 81 mg  81 mg Oral DAILY  atenoloL (TENORMIN) tablet 50 mg  50 mg Oral DAILY  losartan (COZAAR) tablet 50 mg  50 mg Oral DAILY  sodium chloride (NS) flush 5-40 mL  5-40 mL IntraVENous Q8H  
 sodium chloride (NS) flush 5-40 mL  5-40 mL IntraVENous PRN  
 acetaminophen (TYLENOL) tablet 650 mg  650 mg Oral Q4H PRN  
  ondansetron (ZOFRAN ODT) tablet 4 mg  4 mg Oral Q4H PRN  
 senna-docusate (PERICOLACE) 8.6-50 mg per tablet 1 Tab  1 Tab Oral BID PRN  
 azithromycin (ZITHROMAX) 500 mg in 0.9% sodium chloride (MBP/ADV) 250 mL  500 mg IntraVENous Q24H  
 guaiFENesin-codeine (ROBITUSSIN AC) 100-10 mg/5 mL solution 5 mL  5 mL Oral Q4H PRN  
 hydrALAZINE (APRESOLINE) 20 mg/mL injection 10 mg  10 mg IntraVENous Q4H PRN  
 dilTIAZem IR (CARDIZEM) tablet 30 mg  30 mg Oral Q6H  
 sodium chloride 3% hypertonic nebulizer soln  4 mL Nebulization BID RT  
 albuterol-ipratropium (DUO-NEB) 2.5 MG-0.5 MG/3 ML  3 mL Nebulization QID RT  
 guaiFENesin-dextromethorphan (ROBITUSSIN DM) 100-10 mg/5 mL syrup 10 mL  10 mL Oral TID  furosemide (LASIX) tablet 40 mg  40 mg Oral DAILY  rivaroxaban (XARELTO) tablet 20 mg  20 mg Oral DAILY Review of Systems 
+cough +sinus congestion Constitutional: negative for fever, chills, sweats Cardiovascular: negative for chest pain, palpitations, syncope, edema Gastrointestinal:  negative for dysphagia, reflux, vomiting, diarrhea, abdominal pain, or melena Neurologic:  negative for focal weakness, numbness, headache Objective:  
 
Vitals:  
 06/22/20 3090 06/22/20 8972 06/22/20 4112 06/22/20 0977 BP:   112/80 Pulse: 97 96 91 Resp:   19 Temp:   97.3 °F (36.3 °C) SpO2: 95% 96% 99% 94% Weight:  285 lb 15 oz (129.7 kg) Height:      
 
 
 
Intake/Output Summary (Last 24 hours) at 6/22/2020 6134 Last data filed at 6/22/2020 5615 Gross per 24 hour Intake  Output 975 ml Net -975 ml Physical Exam:  
Constitution:  the patient is well developed and in no acute distress, on 2L HF 
EENMT:  Sclera clear, pupils equal, oral mucosa moist 
Respiratory: distant breath sounds, a few crackles Cardiovascular:  RRR without M,G,R 
Gastrointestinal: soft and non-tender; with positive bowel sounds. Musculoskeletal: warm without cyanosis. There is 1+ lower extremity edema. Skin:  no jaundice or rashes, Neurologic: no gross neuro deficits Psychiatric:  alert and oriented x 3 CXR:  
 
 
LAB No results for input(s): GLUCPOC in the last 72 hours. No lab exists for component: [place] Recent Labs  
  06/22/20 
0326 06/21/20 
0410 06/19/20 
2315 WBC 9.4 10.4 9.1 HGB 10.9* 10.9* 11.2* HCT 37.8 37.2 37.8  207 228 Recent Labs  
  06/22/20 
0326 06/21/20 
0410 06/19/20 
2315  141 140  
K 3.4* 3.4* 3.7  102 102 CO2 33* 31 32 GLU 93 115* 104* BUN 20 19 22 CREA 0.90 0.91 1.12* CA 8.2* 8.2* 8.7 ALB  --   --  3.0* TBILI  --   --  1.4* ALT  --   --  27 Recent Labs  
  06/20/20 
0827 PHI 7.339* PCO2I 54.2*  
PO2I 74* HCO3I 29.2* No results for input(s): LCAD, LAC in the last 72 hours. Assessment:  (Medical Decision Making) Hospital Problems  Date Reviewed: 6/22/2020 Codes Class Noted POA Acute hypoxemic respiratory failure ([facility] [address] ICD-10-CM: J96.01 
ICD-9-CM: 518.81  6/21/2020 Unknown Wean O2 as tolerated * (Principal) Hypertensive urgency ICD-10-CM: I16.0 ICD-9-CM: 401.9  6/20/2020 Unknown BP improved Atrial fibrillation [facility]) ICD-10-CM: I48.91 
ICD-9-CM: 427.31  6/20/2020 Unknown Prior to admission Hypoxia ICD-10-CM: R09.02 
ICD-9-CM: 799.02  6/20/2020 Unknown Wean O2 as tolerated Acute respiratory failure with hypoxia and hypercarbia (HCC) ICD-10-CM: J96.01, J96.02 
ICD-9-CM: 518.81  6/20/2020 Unknown Wean O2 as tolerated Edema due to hypervolemia ICD-10-CM: R60.9, E87.70 ICD-9-CM: 782.3, 276.69  6/20/2020 Yes Suspected sleep apnea ICD-10-CM: R29.818 ICD-9-CM: 781.99  6/20/2020 Yes Change to APAP 5-12cm H2O, pt will need split night PSG after discharge [facility] ICD-10-CM: J47.9 ICD-9-CM: 494.0  5/5/2020 Yes Chronic Plan:  (Medical Decision Making)  
 
--wean o2 as tolerated 
--continue nebs 
--continue zmax day 2 
 --continue hypertonic saline --change to APAP 5-12cm H2O and pt to try APAP again tonight. Pt will need split night PSG. More than 50% of the time documented was spent in face-to-face contact with the patient and in the care of the patient on the floor/unit where the patient is located. [place] Lungs:   clear Heart:  RRR with no Murmur/Rubs/Gallops Additional Comments:  Continue nebs, will need psg I have spoken with and examined the patient. I agree with the above assessment and plan as documented.  
 
Ayah Dubose MD

## 2020-06-23 PROBLEM — E66.2 OBESITY HYPOVENTILATION SYNDROME (HCC): Status: ACTIVE | Noted: 2020-01-01

## 2020-06-23 PROBLEM — J96.01 ACUTE RESPIRATORY FAILURE WITH HYPOXIA AND HYPERCARBIA (HCC): Status: RESOLVED | Noted: 2020-01-01 | Resolved: 2020-01-01

## 2020-06-23 PROBLEM — J96.01 ACUTE HYPOXEMIC RESPIRATORY FAILURE (HCC): Status: RESOLVED | Noted: 2020-01-01 | Resolved: 2020-01-01

## 2020-06-23 PROBLEM — I10 HYPERTENSION: Chronic | Status: ACTIVE | Noted: 2020-01-01

## 2020-06-23 PROBLEM — Z20.822 SUSPECTED COVID-19 VIRUS INFECTION: Status: RESOLVED | Noted: 2020-01-01 | Resolved: 2020-01-01

## 2020-06-23 PROBLEM — J96.02 ACUTE RESPIRATORY FAILURE WITH HYPOXIA AND HYPERCARBIA (HCC): Status: RESOLVED | Noted: 2020-01-01 | Resolved: 2020-01-01

## 2020-06-23 PROBLEM — R09.02 HYPOXIA: Chronic | Status: ACTIVE | Noted: 2020-01-01

## 2020-06-23 PROBLEM — R29.818 SUSPECTED SLEEP APNEA: Chronic | Status: ACTIVE | Noted: 2020-01-01

## 2020-06-23 PROBLEM — E87.70 EDEMA DUE TO HYPERVOLEMIA: Status: RESOLVED | Noted: 2020-01-01 | Resolved: 2020-01-01

## 2020-06-23 PROBLEM — J96.11 CHRONIC RESPIRATORY FAILURE WITH HYPOXIA (HCC): Chronic | Status: ACTIVE | Noted: 2020-01-01

## 2020-06-23 PROBLEM — I16.0 HYPERTENSIVE URGENCY: Status: RESOLVED | Noted: 2020-01-01 | Resolved: 2020-01-01

## 2020-06-23 PROBLEM — J18.9 PNEUMONIA: Status: RESOLVED | Noted: 2020-01-01 | Resolved: 2020-01-01

## 2020-06-23 NOTE — PROGRESS NOTES
Oxygen Qualifier Room air: SpO2 with O2 and liter flow Resting SpO2  92%  na Ambulating SpO2  84% 86% on 1L 
87% on 2L 
92% on 3L Completed by: 
 
Chelsea Norton RT

## 2020-06-23 NOTE — DISCHARGE INSTRUCTIONS
Disposition: Home or Self Care  Activity: Activity as tolerated  Diet: DIET REGULAR     Acute High Blood Pressure: Care Instructions  Your Care Instructions     Acute high blood pressure is very high blood pressure. It's a serious problem. Very high blood pressure can damage your brain, heart, eyes, and kidneys. You may have been given medicines to lower your blood pressure. You may have gotten them as pills or through a needle in one of your veins. This is called an IV. And maybe you were given other medicines too. These can be needed when high blood pressure causes other problems. To keep your blood pressure at a lower level, you may need to make healthy lifestyle changes. And you will probably need to take medicines. Be sure to follow up with your doctor about your blood pressure and what you can do about it. Follow-up care is a key part of your treatment and safety. Be sure to make and go to all appointments, and call your doctor if you are having problems. It's also a good idea to know your test results and keep a list of the medicines you take. How can you care for yourself at home? · See your doctor as often as he or she recommends. This is to make sure your blood pressure is under control. You will probably go at least 2 times a year. But it may be more often at first.  · Take your blood pressure medicine exactly as prescribed. You may take one or more types. They include diuretics, beta-blockers, ACE inhibitors, calcium channel blockers, and angiotensin II receptor blockers. Call your doctor if you think you are having a problem with your medicine. · If you take blood pressure medicine, talk to your doctor before you take decongestants or anti-inflammatory medicine, such as ibuprofen. These can raise blood pressure. · Learn how to check your blood pressure at home. Check it often. · Ask your doctor if it's okay to drink alcohol.   · Talk to your doctor about lifestyle changes that can help blood pressure. These include being active and managing your weight. · Don't smoke. Smoking increases your risk for heart attack and stroke. When should you call for help? Call  911 anytime you think you may need emergency care. This may mean having symptoms that suggest that your blood pressure is causing a serious heart or blood vessel problem. Your blood pressure may be over 180/120. For example, call 911 if:  · You have symptoms of a heart attack. These may include:  ? Chest pain or pressure, or a strange feeling in the chest.  ? Sweating. ? Shortness of breath. ? Nausea or vomiting. ? Pain, pressure, or a strange feeling in the back, neck, jaw, or upper belly or in one or both shoulders or arms. ? Lightheadedness or sudden weakness. ? A fast or irregular heartbeat. · You have symptoms of a stroke. These may include:  ? Sudden numbness, tingling, weakness, or loss of movement in your face, arm, or leg, especially on only one side of your body. ? Sudden vision changes. ? Sudden trouble speaking. ? Sudden confusion or trouble understanding simple statements. ? Sudden problems with walking or balance. ? A sudden, severe headache that is different from past headaches. · You have severe back or belly pain. Do not wait until your blood pressure comes down on its own. Get help right away. Call your doctor now or seek immediate care if:  · Your blood pressure is much higher than normal (such as 180/120 or higher), but you don't have symptoms. · You think high blood pressure is causing symptoms, such as:  ? Severe headache.  ? Blurry vision. Watch closely for changes in your health, and be sure to contact your doctor if:  · Your blood pressure measures higher than your doctor recommends at least 2 times. That means the top number is higher or the bottom number is higher, or both. · You think you may be having side effects from your blood pressure medicine. Where can you learn more?   Go to http://barbara-richard.info/  Enter [id number] in the search box to learn more about \"Acute High Blood Pressure: Care Instructions. \"  Current as of: December 16, 2019               Content Version: 12.5  © 1100-1369 [employer]. Care instructions adapted under license by [employer] (which disclaims liability or warranty for this information). If you have questions about a medical condition or this instruction, always ask your healthcare professional. [facility] [address] any warranty or liability for your use of this information. Oxygen Therapy: Care Instructions  Your Care Instructions     Oxygen therapy helps you get more oxygen into your lungs and bloodstream. You may use it if you have a disease that makes it hard to breathe, such as COPD, pulmonary fibrosis (scarring of the lungs), or heart failure. Oxygen therapy can make it easier for you to breathe and can reduce your heart's workload. Some people need extra oxygen all the time. Others need it from time to time throughout the day or overnight. A doctor will prescribe how much oxygen you need and how often to use it. To breathe the oxygen, most people use a nasal cannula (say \"MEG-yuh-jose antonio\"). This is a thin tube with two prongs that fit just inside your nose. People who need a lot of oxygen may need to use a mask that fits over the nose and mouth. Follow-up care is a key part of your treatment and safety. Be sure to make and go to all appointments, and call your doctor if you are having problems. It's also a good idea to know your test results and keep a list of the medicines you take. How can you care for yourself at home? To help yourself  · Using oxygen may dry out your nose or lips. Use water-based lubricants on your lips or nostrils. Do not use an oil-based product like petroleum jelly. · If you use a nasal cannula, the tubing may rub under your nostrils and around your ears.  To keep your skin from getting sore, tuck some gauze under the tubing. Use a water-based lotion on rubbed areas. · Do not use alcohol or take drugs that relax you, because they will slow your breathing rate. · Keep track of how much oxygen is in the tank, and reorder before it runs out. If a holiday is coming up or you expect bad weather, order in advance or make your regular order larger. · You may need extra oxygen when you travel to high altitudes or travel by plane. Ask your doctor about this. · If you are getting oxygen directly to your windpipe through an opening in your neck, your doctor will teach you how to care for the equipment. To make sure oxygen is flowing  · Check the flow by holding your mask or cannula up to your ear and listening for the \"hiss\" of airflow. · If you have a nasal cannula, dip the prongs in a glass of water. If you see bubbles, oxygen is coming through. · Check your pressure gauge or contents indicator. · If you use an oxygen concentrator, make sure it is turned on and plugged in. If you use a cylinder, make sure the valve is open. · Look for kinks, blockages, or water in the tubing. Be sure the tubing is connected to the oxygen source. · Do not change your oxygen flow rate. Your doctor sets this at the correct level. Higher flow rates usually do not help and can increase the risk of harmful carbon dioxide buildup in the blood. To be safe  · Do not leave cords or tubing running across an area where you or someone else may trip on it. · Do not let oxygen containers get hot. Store them in a cool place where there is airflow. Do not leave them in a car trunk or a hot vehicle. · Keep oxygen containers upright. Make sure they do not fall over and get damaged. Try securing the tanks in a sturdy container or securing them with a rope or a chain. · Watch for signs of oxygen leaks. If you hear a loud hissing from your container or if it empties too fast, stay away from the container.  Open windows right away and call the company that brought the oxygen system to your home. · Do not use oxygen around anything that could spark or easily cause a fire. ? Do not smoke or let others smoke while you are using oxygen. Put up \"no smoking\" signs in your home. ? Do not use oxygen near open flames, such as candles, fireplaces, gas stoves, or hot water heaters. Do not use it near electric razors, hair dryers, heating pads, or anything that may spark. ? Keep a working fire extinguisher in your home where it is easy to get to.  ? If a fire starts, turn off the oxygen right away and leave the house. ? If you have an oxygen concentrator, do not use it if the cord looks damaged. Do not use an extension cord to plug it in. Do not plug it into an outlet that has other appliances plugged into it. To care for the equipment  · Follow the directions that come with the equipment for using and caring for it. · Wash your cannula or mask with a liquid soap and warm water 1 or 2 times a week. Replace them every 2 to 4 weeks. · If you have a cold, change the nasal prongs when your cold symptoms are done. · If you have an oxygen concentrator, unplug the unit and wipe down the cabinet with a damp cloth daily. Clean the air filter at least 2 times a week. Where can you learn more? Go to http://barbara-richard.info/  Enter E117 in the search box to learn more about \"Oxygen Therapy: Care Instructions. \"  Current as of: February 24, 2020               Content Version: 12.5  © 2006-2020 [employer]. Care instructions adapted under license by [employer] (which disclaims liability or warranty for this information). If you have questions about a medical condition or this instruction, always ask your healthcare professional. [address] any warranty or liability for your use of this information.

## 2020-06-23 NOTE — PROGRESS NOTES
Slept well with CPAP for approximately 3 hours, encouraged pt to remain on CPAP but refusing at this time. Returned to [place] at [facility].

## 2020-06-23 NOTE — PROGRESS NOTES
Patient placed on CPAP for use QHS. No distress or discomfort currently noted. Will continue to monitor. 06/23/20 0023 Oxygen Therapy O2 Sat (%) 95 % Pulse via Oximetry 90 beats per minute O2 Device CPAP mask O2 Flow Rate (L/min) 3 l/min CPAP/BIPAP  
CPAP/BIPAP Start/Stop On Device Mode CPAP, auto-titrating  
$$ CPAP Daily Yes Mask Type and Size Full face;Small Skin Condition Intact EPAP (cm H2O)  
(5-20 cmH2O autoset) Total RR (Spontaneous) 16 breaths per minute Leak (Estimated)  
(good mask fit) Pt's Home Machine No  
Biomedical Check Performed Yes Settings Verified Yes

## 2020-06-23 NOTE — PROGRESS NOTES
Pt requesting something for sleep because benadryl did not work last night. Hospitalist paged, new order received. RT notified that pt wants to wait until 2330 for CPAP mask.

## 2020-06-23 NOTE — DISCHARGE SUMMARY
Hospitalist Discharge Summary Admit Date:  2020 10:58 PM  
DC date:   
Name:  Bozena Holley Age:  78 y.o. 
:  1950 MRN:  [id number] PCP:  Randa Barcenas MD 
Treatment Team: Attending Provider: Kelly Raygoza MD; Utilization Review: Pj Ramires, CASIE; Consulting Provider: Oh Blake MD; Consulting Provider: Baron Salas MD; Utilization Review: Ward Alegria RN; Care Manager: Mary Melo RN; Staff Nurse: Allison Salas RN 
 
Problem List for this Hospitalization: 
Hospital Problems as of 2020 Date Reviewed: 2020 Codes Class Noted - Resolved POA Obesity hypoventilation syndrome (HCC) ICD-10-CM: Z25.5 ICD-9-CM: 278.03  2020 - Present Yes Atrial fibrillation ([facility] ICD-10-CM: I48.91 
ICD-9-CM: 427.31  2020 - Present Yes Chronic respiratory failure with hypoxia (HCC) (Chronic) ICD-10-CM: J96.11 
ICD-9-CM: 518.83, 799.02  2020 - Present Yes Suspected sleep apnea (Chronic) ICD-10-CM: U44.581 ICD-9-CM: 781.99  2020 - Present Yes Bronchiectasis ([facility] ICD-10-CM: J47.9 ICD-9-CM: 494.0  2020 - Present Yes Hypertension (Chronic) ICD-10-CM: I10 
ICD-9-CM: 401.9  2020 - Present Yes RESOLVED: Acute hypoxemic respiratory failure ([facility] ICD-10-CM: J96.01 
ICD-9-CM: 518.81  2020 - 2020 Yes * (Principal) RESOLVED: Hypertensive urgency ICD-10-CM: I16.0 ICD-9-CM: 401.9  2020 - 2020 Yes RESOLVED: Acute respiratory failure with hypoxia and hypercarbia (HCC) ICD-10-CM: J96.01, J96.02 
ICD-9-CM: 518.81  2020 - 2020 Yes RESOLVED: Edema due to hypervolemia ICD-10-CM: R60.9, E87.70 ICD-9-CM: 782.3, 276.69  2020 - 2020 Yes Admission HPI from 2020:   
\"  Bozena Holley is a 78 y.o. female with a history of hypertension, CHF, and bronchiectasis presenting with dizziness.   Patient states that she recently started Xarelto 8 days ago. 2 days after starting Xarelto she began to have dizzy spells. She describes dizziness initially with coughing to clear her lungs, which she has not experienced before. Then she developed dizziness with walking at home. She has not had any episodes of fall or syncope. She denies chest pain, headache, abdominal pain, nausea, vomiting, diarrhea, fevers, chills or diaphoresis. She has no melena, hematochezia, or hemoptysis. Patient has shortness of breath and cough at baseline from her bronchiectasis. She does not use home oxygen. She states her blood pressures are usually 120s when she is seen in the clinic. On arrival to the ED her blood pressures in the 180s to low 90s. She was found to be hypoxic with a O2 sat of 74% on room air improved to the low 90s on oxygen. \" 
 
Hospital Course: Pt was admitted with hypoxia. Started on azithromycin, nebs, hypertonic saline. Pt is feeling better but still needing oxygen. Pulmonary suspects obesity related resp complications OHS and JEANNE which I agree with. She needs oxygen chronically and probably needs cpap too but needs split night PSG per Pulm notes. Will arrange follow up and home oxygen. Cardiology consulted for afib and atenolol changed to metoprolol. Still borderline rates so will increase metop today. Follow up with cardiology in 1-2 weeks for reassessment Disposition: Home or Self Care Activity: Activity as tolerated Diet: DIET REGULAR Code Status: Full Code Follow Up Orders: Follow-up Appointments Procedures  FOLLOW UP VISIT Appointment in: Other (Specify) 2 weeks Pulmonary for follow up with split night PSG 1-2 weeks cardiology for follow up afib, medication changes 2 weeks Pulmonary for follow up with split night PSG 
 
1-2 weeks cardiology for follow up afib, medication changes Standing Status:   Standing Number of Occurrences:   1   Order Specific Question:   Appointment in  
 Answer: Other (Specify) Follow-up Information Follow up With Specialties Details Why Contact Info [facility] Disease In 2 weeks follow up with split night PSG [phone] [facility] 
[phone] [facility] OFF[facility] Cardiology  1-2 weeks to follow up afib, HTN medication changes [address] [place] [phone] [facility] hospital 
[phone] Discharge meds at bottom of this note. Plan was discussed with pt. All questions answered. Patient was stable at time of discharge. Given instructions to call a physician or return if any concerns. Discharge summary and encounter summary was sent to PCP electronically via \"Comm Mgt\" link in [facility], if possible. Diagnostic Imaging/Tests:  
Xr Chest Pa Lat Result Date: 6/19/2020 EXAM: Chest x-ray. INDICATION: Hypoxia. COMPARISON: Prior chest x-ray on May 4, 2020. TECHNIQUE: Frontal and lateral views of the chest were obtained. FINDINGS: Cardiomegaly is unchanged. The lungs are clear. No pneumothorax, vascular congestion or pleural effusion is seen. IMPRESSION: 1. No acute process. 2. Unchanged cardiomegaly. Ct Head Wo Cont Result Date: 6/19/2020 EXAM: Noncontrast CT head. INDICATION: Dizziness. COMPARISON: None. TECHNIQUE: Noncontrast CT images of the head were obtained. Radiation dose reduction techniques were used for this study. Our CT scanners use one or all of the following:  Automated exposure control, adjustment of the mA or kV according to patient size, iterative reconstruction. FINDINGS: Brain volume is appropriate for age. No acute infarct, hemorrhage or mass is identified. There is no mass effect, midline shift or depressed fracture. The visualized paranasal sinuses and mastoid air cells are clear. IMPRESSION: No acute process. Ct Chest W Cont Result Date: 6/19/2020 EXAM: CT CHEST WITH CONTRAST INDICATION: pleural effusion, persistent atelectasis on CXR. COMPARISON: Chest x-ray 5/4/2020 TECHNIQUE:  CT imaging was performed of the chest after intravenous injection of 100 mL Isovue 370. Intravenous contrast was used for better evaluation of solid organs and vascular structures. Coronal reformatted imaging provided. Radiation dose reduction techniques were used for this study. Our CT scanners use one or all of the following: Automated exposure control, adjustment of the mA and/or kV according to patient size, iterative reconstruction. FINDINGS: Mediastinum and visualized thyroid: Multistation mediastinal adenopathy, including a right paratracheal lymph node measuring 1 cm short axis. Heart: Enlarged. Large Vessels: Normal. Lungs and pleura: There is a trace left pleural effusion. 5 mm groundglass nodule anteriorly in the left lower lobe. Mosaic pattern of attenuation, especially in the lung bases. No consolidation. Airways: Normal. Lymph nodes: As above Bones/Soft tissues: No aggressive osseous lesion. Visualized abdomen: Multiple cysts throughout the liver. There is a large cyst and geode the left lobe which measures 13.7 x 11.8 cm and is without clear worrisome features. Marnell Santy IMPRESSION: 1. Trace left pleural effusion. Nonspecific multistation mediastinal adenopathy. 2.  Mosaic pattern of lung attenuation, which could reflect atelectasis or a be secondary to inflammation of small airways. 3.  Hepatic cysts, including a large 13.7 cm cyst anteriorly to the left lobe. If the patient has discomfort in this region, consider interventional radiology consultation for aspiration and possible sclerotherapy. Echocardiogram results: 
Results for orders placed or performed during the hospital encounter of 06/19/20  
2D ECHO COMPLETE ADULT (TTE) W OR WO CONTR Narrative [place] [address] [place], [address] [facility] 
[phone] Transthoracic Echocardiogram 
2D, M-mode, Doppler, and Color Doppler Patient: Beto Juan 
MR #: [id number] : 1950 Age: 78 years Gender: Female Study date: 2020 Account #: [de-identified] Height: 62 in 
Weight: 259.4 lb 
BSA: 2.14 mï¾² Status:Routine Location: 206 BP: 149/ 69 Allergies: PSEUDOEPHEDRINE Sonographer:  Stewart [place]ook Group:  [facility] Card[facility] Referring Physician:  Celina Manzo. Katrin Ramos MD [facility] Reading Physician:  Celina Manzo. Katrin Ramos MD [facility] INDICATIONS: A-Fib PROCEDURE: This was a routine study. A transthoracic echocardiogram was 
performed. The study included complete 2D imaging, M-mode, complete spectral 
Doppler, and color Doppler. Intravenous contrast (Definity, 1 ml) was 
administered to opacify the left ventricle. Image quality was adequate. LEFT VENTRICLE: Size was normal. Systolic function was normal. Ejection 
fraction was estimated in the range of 50 % to 55 %. There were no regional 
wall motion abnormalities. There was mild concentric hypertrophy. The study  
was 
not technically sufficient to allow evaluation of LV diastolic function. RIGHT VENTRICLE: The ventricle was dilated. Systolic function was normal. 
Estimated peak pressure was in the range of 75-80 mmHg. LEFT ATRIUM: The atrium was moderately dilated. RIGHT ATRIUM: The atrium was moderately to markedly dilated. SYSTEMIC VEINS: IVC: The inferior vena cava was dilated. The respirophasic 
change in diameter was less than 50%. AORTIC VALVE: The valve was structurally normal, tri-commissural. There was  
no 
evidence for stenosis. There was no insufficiency. MITRAL VALVE: Valve structure was normal. There was no evidence for stenosis. There was mild regurgitation. TRICUSPID VALVE: The valve structure was normal. There was no evidence for 
stenosis. There was moderate regurgitation. PULMONIC VALVE: The valve structure was normal. There was no evidence for stenosis. There was mild regurgitation. PERICARDIUM: There was no pericardial effusion. AORTA: The root exhibited normal size. SUMMARY: 
 
-  Left ventricle: Systolic function was normal. Ejection fraction was 
estimated in the range of 50 % to 55 %. There were no regional wall motion 
abnormalities. There was mild concentric hypertrophy. 
 
-  Right ventricle: The ventricle was dilated. -  Left atrium: The atrium was moderately dilated. -  Right atrium: The atrium was moderately to markedly dilated. -  Inferior vena cava, hepatic veins: The inferior vena cava was dilated. The 
respirophasic change in diameter was less than 50%. -  Mitral valve: There was mild regurgitation. 
 
-  Tricuspid valve: There was moderate regurgitation. SYSTEM MEASUREMENT TABLES 
 
2D mode AoR Diam (2D): 3 cm 
LA Dimension (2D): 5 cm Left Atrium Systolic Volume Index; Method of Disks, Biplane; 2D mode;: 40.2 
ml/m2 IVS/LVPW (2D): 1 IVSd (2D): 1.2 cm LVIDd (2D): 4.4 cm LVIDs (2D): 3.2 cm 
LVOT Area (2D): 3.1 cm2 LVPWd (2D): 1.2 cm RVIDd (2D): 4 cm Unspecified Scan Mode Peak Grad; Mean; Antegrade Flow: 11 mm[Hg] Vmax; Antegrade Flow: 166 cm/s LVOT Diam: 2 cm Peak Grad; Mean; Antegrade Flow: 3 mm[Hg] Vmax; Antegrade Flow: 91.9 cm/s Prepared and signed by Tremayne Cuello MD [facility] Signed 20-Jun-2020 17:06:45 Procedures done this admission: * No surgery found * All Micro Results Procedure Component Value Units Date/Time CULTURE, RESPIRATORY/SPUTUM/BRONCH Rosa Flores [[id number]] Collected:  06/21/20 2009 Order Status:  Completed Specimen:  Sputum Updated:  06/22/20 1041 Special Requests: NO SPECIAL REQUESTS     
  GRAM STAIN    
  GRAM STAIN EXAMINATION OF THIS SPECIMEN INDICATED OROPHARYNGEAL CONTAMINATION. PLEASE SUBMIT A NEW SPECIMEN OR NOTIFY THE MICRO DEPT WITHIN 48HRS IF FURTHER WORKUP IS DESIRED. Culture result:    
  Please reorder and recollect. RESULTS VERIFIED, PHONED TO AND READ BACK BY 
Delmis Zapata RN ON 6/22/20 @ 1040, TA 
  
  
 
 
SARS-CoV-2 Lab Results \"Novel Coronavirus\" Test: No results found for: COV2NT \"Emergent Disease\" Test:  
Lab Results Component Value Date/Time EDPR Not detected 05/04/2020 10:46 PM  
 
\"SARS-COV-2\" Test: No results found for: XGCOVT As of: 8:07 AM on 6/23/2020 Labs: Results:  
   
BMP, Mg, Phos Recent Labs  
  06/23/20 
3128 06/22/20 
0326 06/21/20 
0410 * 144 141  
K 3.7 3.4* 3.4*  
* 104 102 CO2 31 33* 31 AGAP 7 7 8 BUN 17 20 19 CREA 0.89 0.90 0.91  
CA 8.1* 8.2* 8.2*  
 93 115* CBC Recent Labs  
  06/23/20 
0623 06/22/20 
0326 06/21/20 
0410 WBC 8.1 9.4 10.4 RBC 4.27 4.44 4.46 HGB 10.6* 10.9* 10.9* HCT 35.7* 37.8 37.2  201 207 LFT No results for input(s): ALT, TBIL, AP, TP, ALB, GLOB, AGRAT in the last 72 hours. No lab exists for component: SGOT, GPT Cardiac Testing Lab Results Component Value Date/Time Troponin-I, Qt. <0.02 (L) 05/05/2020 06:24 AM  
 Troponin-I, Qt. <0.02 (L) 05/04/2020 05:04 PM  
  
Coagulation Tests No results found for: PTP, INR, APTT, INREXT, INREXT A1c No results found for: HBA1C, HGBE8, CXB0RJTZ, CPB2UCIV Lipid Panel No results found for: CHOL, CHOLPOCT, CHOLX, CHLST, CHOLV, 965107, HDL, HDLP, LDL, LDLC, DLDLP, 618311, VLDLC, VLDL, TGLX, TRIGL, TRIGP, TGLPOCT, CHHD, CHHDX Thyroid Panel No results found for: TSH, T4, FT4, TT3, T3U, TSHEXT, TSHEXT Most Recent UA Lab Results Component Value Date/Time  Color SHANTE 06/20/2020 05:16 AM  
 Appearance CLEAR 06/20/2020 05:16 AM  
 Specific gravity 1.042 (H) 06/20/2020 05:16 AM  
 pH (UA) 5.5 06/20/2020 05:16 AM  
 Protein 30 (A) 06/20/2020 05:16 AM  
 Glucose Negative 06/20/2020 05:16 AM  
 Ketone Negative 06/20/2020 05:16 AM  
 Bilirubin Negative 06/20/2020 05:16 AM  
 Blood Negative 06/20/2020 05:16 AM  
 Urobilinogen 1.0 06/20/2020 05:16 AM  
 Nitrites Negative 06/20/2020 05:16 AM  
 Leukocyte Esterase Negative 06/20/2020 05:16 AM  
 WBC 5-10 06/20/2020 05:16 AM  
 RBC 0-3 06/20/2020 05:16 AM  
 Epithelial cells 3-5 06/20/2020 05:16 AM  
 Bacteria 0 06/20/2020 05:16 AM  
 Casts 3-5 06/20/2020 05:16 AM  
  
 
Allergies Allergen Reactions  Pseudoephedrine Other (comments) \"gave me high blood pressure\" There is no immunization history on file for this patient. All Labs from Last 24 Hrs: 
Recent Results (from the past 24 hour(s)) METABOLIC PANEL, BASIC Collection Time: 06/23/20  6:23 AM  
Result Value Ref Range Sodium 146 (H) 136 - 145 mmol/L Potassium 3.7 3.5 - 5.1 mmol/L Chloride 108 (H) 98 - 107 mmol/L  
 CO2 31 21 - 32 mmol/L Anion gap 7 7 - 16 mmol/L Glucose 100 65 - 100 mg/dL BUN 17 8 - 23 MG/DL Creatinine 0.89 0.6 - 1.0 MG/DL  
 GFR est AA >60 >60 ml/min/1.73m2 GFR est non-AA >60 >60 ml/min/1.73m2 Calcium 8.1 (L) 8.3 - 10.4 MG/DL  
CBC W/O DIFF Collection Time: 06/23/20  6:23 AM  
Result Value Ref Range WBC 8.1 4.3 - 11.1 K/uL  
 RBC 4.27 4.05 - 5.2 M/uL  
 HGB 10.6 (L) 11.7 - 15.4 g/dL HCT 35.7 (L) 35.8 - 46.3 % MCV 83.6 79.6 - 97.8 FL  
 MCH 24.8 (L) 26.1 - 32.9 PG  
 MCHC 29.7 (L) 31.4 - 35.0 g/dL  
 RDW 17.5 (H) 11.9 - 14.6 % PLATELET 892 255 - 792 K/uL MPV 10.4 9.4 - 12.3 FL ABSOLUTE NRBC 0.00 0.0 - 0.2 K/uL Discharge Exam: 
Patient Vitals for the past 24 hrs: 
 Temp Pulse Resp BP SpO2  
06/23/20 0746     96 %  
06/23/20 0721 97.9 °F (36.6 °C) (!) 108 22 129/64 92 %  
06/23/20 0611  (!) 114   97 % 06/23/20 0330 98.3 °F (36.8 °C) 98 24 (!) 138/95 95 % 06/23/20 0023     95 % 06/22/20 2300 98 °F (36.7 °C) (!) 101 28 130/80 94 % 06/22/20 2055     94 % 06/22/20 1958  91   92 %  
06/22/20 1845 97.5 °F (36.4 °C) (!) 114 18 159/81 90 % 06/22/20 1506 98 °F (36.7 °C) (!) 101 19 122/73 92 %  
06/22/20 1301     94 % 06/22/20 1115 97.9 °F (36.6 °C) 77 18 110/73 94 % Oxygen Therapy O2 Sat (%): 96 % (06/23/20 0746) Pulse via Oximetry: 95 beats per minute (06/23/20 0746) O2 Device: Nasal cannula (06/23/20 0746) PEEP/CPAP (cm H2O): 3 cm H20 (06/23/20 0746) O2 Flow Rate (L/min): 3 l/min (06/23/20 0330) Estimated body mass index is 49.93 kg/m² as calculated from the following: 
  Height as of this encounter: 5' 2\" (1.575 m). Weight as of this encounter: 123.8 kg (273 lb). Intake/Output Summary (Last 24 hours) at 6/23/2020 9619 Last data filed at 6/23/2020 8489 Gross per 24 hour Intake  Output 1400 ml Net -1400 ml *Note that automatically entered I/Os may not be accurate; dependent on patient compliance with collection and accurate  by assistants. General:    Well nourished. Alert. Eyes:   Normal sclerae. Extraocular movements intact. ENT:  Normocephalic, atraumatic. Moist mucous membranes CV:   Regular rate and rhythm. No murmur, rub, or gallop. Lungs:  diminished bilaterally. No wheezing, rhonchi, or rales. Abdomen: Soft, nontender, nondistended. Extremities: Warm and dry. No cyanosis or edema. Neurologic: CN II-XII grossly intact. No gross focal deficits Skin:     No rashes or jaundice. Psych:  Normal mood and affect. Current Med List in Hospital:  
Current Facility-Administered Medications Medication Dose Route Frequency  metoprolol tartrate (LOPRESSOR) tablet 100 mg  100 mg Oral Q12H  diphenhydrAMINE (BENADRYL) capsule 25 mg  25 mg Oral QHS PRN  
 traZODone (DESYREL) tablet 50 mg  50 mg Oral QHS PRN  
 loratadine (CLARITIN) tablet 10 mg  10 mg Oral DAILY PRN  
 aspirin chewable tablet 81 mg  81 mg Oral DAILY  losartan (COZAAR) tablet 50 mg  50 mg Oral DAILY  sodium chloride (NS) flush 5-40 mL  5-40 mL IntraVENous Q8H  
 sodium chloride (NS) flush 5-40 mL  5-40 mL IntraVENous PRN  
 acetaminophen (TYLENOL) tablet 650 mg  650 mg Oral Q4H PRN  
  ondansetron (ZOFRAN ODT) tablet 4 mg  4 mg Oral Q4H PRN  
 senna-docusate (PERICOLACE) 8.6-50 mg per tablet 1 Tab  1 Tab Oral BID PRN  
 azithromycin (ZITHROMAX) 500 mg in 0.9% sodium chloride (MBP/ADV) 250 mL  500 mg IntraVENous Q24H  
 guaiFENesin-codeine (ROBITUSSIN AC) 100-10 mg/5 mL solution 5 mL  5 mL Oral Q4H PRN  
 hydrALAZINE (APRESOLINE) 20 mg/mL injection 10 mg  10 mg IntraVENous Q4H PRN  
 sodium chloride 3% hypertonic nebulizer soln  4 mL Nebulization BID RT  
 albuterol-ipratropium (DUO-NEB) 2.5 MG-0.5 MG/3 ML  3 mL Nebulization QID RT  
 guaiFENesin-dextromethorphan (ROBITUSSIN DM) 100-10 mg/5 mL syrup 10 mL  10 mL Oral TID  rivaroxaban (XARELTO) tablet 20 mg  20 mg Oral DAILY Discharge Info:  
Current Discharge Medication List  
  
START taking these medications Details  
metoprolol tartrate (LOPRESSOR) 100 mg IR tablet Take 1 Tab by mouth every twelve (12) hours. Qty: 60 Tab, Refills: 1  
  
azithromycin (ZITHROMAX) 250 mg tablet Take 1 Tab by mouth daily for 3 days. Qty: 3 Tab, Refills: 0 Associated Diagnoses: [facility]ct[facility] with [facility] exacerbation [facility] CONTINUE these medications which have CHANGED Details  
losartan (COZAAR) 50 mg tablet Take 1 Tab by mouth daily. Indications: high blood pressure Qty: 30 Tab, Refills: 1 CONTINUE these medications which have NOT CHANGED Details  
loratadine (Claritin) 10 mg tablet Take 10 mg by mouth daily as needed for Allergies. ergocalciferol (Vitamin D2) 1,250 mcg (50,000 unit) capsule Take 50,000 Units by mouth every seven (7) days. Taken on Sunday, per patient  
  
rivaroxaban (Xarelto) 15 mg tab tablet Take 15 mg by mouth daily (with dinner). albuterol (PROVENTIL HFA, VENTOLIN HFA, PROAIR HFA) 90 mcg/actuation inhaler Take 1 Puff by inhalation every four (4) hours as needed for Wheezing or Shortness of Breath. Qty: 1 Inhaler, Refills: 1 furosemide (LASIX) 10 mg/mL oral solution Take  by mouth daily. aspirin 81 mg chewable tablet Take 81 mg by mouth daily. acetaminophen (TYLENOL) 325 mg tablet Take 2 Tabs by mouth every six (6) hours as needed for Pain. Qty: 20 Tab, Refills: 0 STOP taking these medications  
  
 atenoloL (TENORMIN) 25 mg tablet Comments:  
Reason for Stopping:   
   
  
 
 
 
Time spent in patient discharge planning and coordination 35 minutes.  
 
Signed: 
Waldo Schlatter, MD

## 2020-06-23 NOTE — PROGRESS NOTES
Portable oxygen (3 lpm via NC) provided to Ms. Boswell in room 206 from [facility] (phone [phone]) and order faxed to [address] [place] [address] at [phone]. No other discharge needs identified. Care Management Interventions Transition of Care Consult (CM Consult): Discharge Planning Current Support Network: Own Home, Lives with Spouse Discharge Location Discharge Placement: Home

## 2020-06-23 NOTE — PROGRESS NOTES
[facility] Admission Date: 6/19/2020 Daily Progress Note: 6/23/2020 The patient's chart is reviewed and the patient is discussed with the staff. Patient is a 76 y. o. female presented to the ER with dizziness.  She has a PMHx of HTN, CHF, and bronchiectasis. She was placed on Xarelto 8 days ago, by her PCP for apparent A fib.  The patient states that a physician mentioned that she was going in and out of A Fib upon her last admission in May, but she was not seen by cardiology at that time, nor did she had any workup outpatient.  She started with dizzy spells two days after initiating therapy.  Initially the dizziness was only during coughing spells, but it has continued to worsen.  She was transported to the ER via EMS, where she was also noted to be hypertensive with SBPs in the 180s DBPs low 90s.  Per the patient she runs typically with SBP in the 120s when she is seen at the clinic. Stanley Quinn was also hypoxic with an O2 sat of 74% on RA upon admission. She has SOB and cough at baseline from her bronchiectasis, but does not wear home O2. She denies ever being a smoker. Uses albuterol at home PRN. Subjective:  
 
Seen while in bathroom off O2 On RA o2 sat 75% without overt dyspnea. States she feels better but wore CPAP only 3h last night. Current Facility-Administered Medications Medication Dose Route Frequency  diphenhydrAMINE (BENADRYL) capsule 25 mg  25 mg Oral QHS PRN  
 metoprolol tartrate (LOPRESSOR) tablet 50 mg  50 mg Oral Q12H  
 traZODone (DESYREL) tablet 50 mg  50 mg Oral QHS PRN  
 loratadine (CLARITIN) tablet 10 mg  10 mg Oral DAILY PRN  
 aspirin chewable tablet 81 mg  81 mg Oral DAILY  losartan (COZAAR) tablet 50 mg  50 mg Oral DAILY  sodium chloride (NS) flush 5-40 mL  5-40 mL IntraVENous Q8H  
 sodium chloride (NS) flush 5-40 mL  5-40 mL IntraVENous PRN  
 acetaminophen (TYLENOL) tablet 650 mg  650 mg Oral Q4H PRN  
 Patient examined and evaluated with NNP   See NNP note for additional details     Interim: No acute overnight events.     Visit Vitals  BP 82/48   Pulse 130   Temp 98.2 °F (36.8 °C)   Resp 32   Ht 20.5\" (52.1 cm) Comment: Filed from Delivery Summary   Wt 3070 g   HC 33.5 cm (13.19\") Comment: Filed from Delivery Summary   SpO2 98%   BMI 11.32 kg/m²     General:  term infant in open crib.  Skin:  Pink and well perfused. Mild jaundice.  HEENT:  Fontanelles soft, not full or depressed.  normocephalic, atraumatic and anterior fontonelle soft and flat.  Normal conjunctivae.  Eyes without drainage.  Ears normal.  Cardiovascular:  The precordium is quiet.  Rhythm and rate are regular.  No murmur noted.  The femoral pulses are equal and palpable.  Capillary refill less than 2 seconds.  Lungs:  Clear to auscultation.  Equal aeration bilaterally.  No retractions.  Abdomen:  Soft, Rounded, Nontender, No hepatosplenomegaly and Bowel sounds active.  Genitourinary:  Normal female genitalia.  Extremities:  Moves all four extremities.  Equal muscle bulk.  Neurologic:  light sleep. Tone normal for gestational age.    VS, I&O, Lab Data reviewed    A: Baby Kiki Walter is a former Gestational Age: 39w3d infant now 2 days old with a corrected gestational age of 39w 5d.  Patient Active Problem List   Diagnosis   • Respiratory distress of , unspecified   • Need for observation and evaluation of  for sepsis   • Poor feeding of        P:   - Continuous CR monitoring.   - Increase feeding volume.   - Encourage PO feeding, gavage as needed.   - Follow jaundice clinically.   - Urine tox negative, meconium pending  - Social work following due to Mom's mental illness history.     Mom updated at bedside.     I saw and examined Kiki Walter on 2018 at 1:10 PM, and patient requires: NICU Intensive Care: Constant monitoring by health care team and Enteral/ Parental nutritional adjustments         ondansetron (ZOFRAN ODT) tablet 4 mg  4 mg Oral Q4H PRN  
 senna-docusate (PERICOLACE) 8.6-50 mg per tablet 1 Tab  1 Tab Oral BID PRN  
 azithromycin (ZITHROMAX) 500 mg in 0.9% sodium chloride (MBP/ADV) 250 mL  500 mg IntraVENous Q24H  
 guaiFENesin-codeine (ROBITUSSIN AC) 100-10 mg/5 mL solution 5 mL  5 mL Oral Q4H PRN  
 hydrALAZINE (APRESOLINE) 20 mg/mL injection 10 mg  10 mg IntraVENous Q4H PRN  
 sodium chloride 3% hypertonic nebulizer soln  4 mL Nebulization BID RT  
 albuterol-ipratropium (DUO-NEB) 2.5 MG-0.5 MG/3 ML  3 mL Nebulization QID RT  
 guaiFENesin-dextromethorphan (ROBITUSSIN DM) 100-10 mg/5 mL syrup 10 mL  10 mL Oral TID  rivaroxaban (XARELTO) tablet 20 mg  20 mg Oral DAILY Review of Systems 
+cough +sinus congestion Constitutional: negative for fever, chills, sweats Cardiovascular: negative for chest pain, palpitations, syncope, edema Gastrointestinal:  negative for dysphagia, reflux, vomiting, diarrhea, abdominal pain, or melena Neurologic:  negative for focal weakness, numbness, headache Objective:  
 
Vitals:  
 06/22/20 2300 06/23/20 0023 06/23/20 0330 06/23/20 5386 BP: 130/80  (!) 138/95 Pulse: (!) 101  98 (!) 114 Resp: 28  24 Temp: 98 °F (36.7 °C)  98.3 °F (36.8 °C) SpO2: 94% 95% 95% 97% Weight:    273 lb (123.8 kg) Height:      
 
 
 
Intake/Output Summary (Last 24 hours) at 6/23/2020 4856 Last data filed at 6/23/2020 8285 Gross per 24 hour Intake  Output 1400 ml Net -1400 ml Physical Exam:  
Constitution:  the patient is well developed and in no acute distress, on 2L HF- O2 sat 75% off O2 with no overt dyspnea EENMT:  Sclera clear, pupils equal, oral mucosa moist 
Respiratory: distant breath sounds, a few crackles Cardiovascular:  RRR without M,G,R 
Gastrointestinal: soft and non-tender; with positive bowel sounds. Musculoskeletal: warm without cyanosis. There is 1+ lower extremity edema.  
Skin:  no jaundice or rashes, 
 Neurologic: no gross neuro deficits Psychiatric:  alert and oriented x 3 CXR:  
 
 
LAB No results for input(s): GLUCPOC in the last 72 hours. No lab exists for component: [place] Recent Labs  
  06/23/20 
7249 06/22/20 
0326 06/21/20 
0410 WBC 8.1 9.4 10.4 HGB 10.6* 10.9* 10.9* HCT 35.7* 37.8 37.2  201 207 Recent Labs  
  06/22/20 
0326 06/21/20 
0410  141  
K 3.4* 3.4*  
 102 CO2 33* 31  
GLU 93 115* BUN 20 19 CREA 0.90 0.91  
CA 8.2* 8.2* Recent Labs  
  06/20/20 
0827 PHI 7.339* PCO2I 54.2*  
PO2I 74* HCO3I 29.2* No results for input(s): LCAD, LAC in the last 72 hours. Assessment:  (Medical Decision Making) Hospital Problems  Date Reviewed: 6/22/2020 Codes Class Noted POA Acute hypoxemic respiratory failure ([facility] [address] ICD-10-CM: J96.01 
ICD-9-CM: 518.81  6/21/2020 Unknown Wean O2 as tolerated - she will need home O2 and likely has been chronically hypoxic at home- likely [facility]  
 * (Principal) Hypertensive urgency ICD-10-CM: I16.0 ICD-9-CM: 401.9  6/20/2020 Unknown BP improved Atrial fibrillation [facility]) ICD-10-CM: I48.91 
ICD-9-CM: 427.31  6/20/2020 Unknown Prior to admission Hypoxia ICD-10-CM: R09.02 
ICD-9-CM: 799.02  6/20/2020 Unknown Likely chromic, seen in AM today in bathroom off O2 without overt dyspnea at O2 sat of 75% Acute respiratory failure with hypoxia and hypercarbia (HCC) ICD-10-CM: J96.01, J96.02 
ICD-9-CM: 518.81  6/20/2020 Unknown Will need home O2 Edema due to hypervolemia ICD-10-CM: R60.9, E87.70 ICD-9-CM: 782.3, 276.69  6/20/2020 Yes Suspected sleep apnea ICD-10-CM: R29.818 ICD-9-CM: 781.99  6/20/2020 Yes Change to APAP 5-12cm H2O, pt will need split night PSG after discharge wore CPAP only 3 h last night says she cannot tolarate it. [facility] ICD-10-CM: J47.9 ICD-9-CM: 494.0  5/5/2020 Yes Chronic Plan:  (Medical Decision Making) --continue nebs 
--continue zmax day 3 
--continue hypertonic saline -- Pt will need split night PSG.  
--she will need home O2 and is likely chronically hypoxic with [facility] 
 
More than 50% of the time documented was spent in face-to-face contact with the patient and in the care of the patient on the floor/unit where the patient is located.  
 
Patrick Marin MD

## 2020-08-19 PROBLEM — J96.21 ACUTE ON CHRONIC RESPIRATORY FAILURE WITH HYPOXIA (HCC): Status: ACTIVE | Noted: 2020-01-01

## 2020-08-19 NOTE — ED NOTES
TRANSFER - OUT REPORT: 
 
Verbal report given to Libia(name) on LIZZY  being transferred to [facility](unit) for routine progression of care Report consisted of patients Situation, Background, Assessment and  
Recommendations(SBAR). Information from the following report(s) SBAR was reviewed with the receiving nurse. Lines:  
Peripheral IV 08/19/20 Left Antecubital (Active) Site Assessment Clean, dry, & intact 08/19/20 1623 Phlebitis Assessment 0 08/19/20 1623 Infiltration Assessment 0 08/19/20 1623 Dressing Status Clean, dry, & intact 08/19/20 1623 Dressing Type Transparent 08/19/20 1623 Hub Color/Line Status Pink 08/19/20 1623 Peripheral IV 08/19/20 Right Antecubital (Active) Site Assessment Clean, dry, & intact 08/19/20 1635 Phlebitis Assessment 0 08/19/20 1635 Infiltration Assessment 0 08/19/20 1635 Dressing Status Clean, dry, & intact 08/19/20 1635 Dressing Type Transparent 08/19/20 1635 Hub Color/Line Status Pink 08/19/20 1635 Opportunity for questions and clarification was provided. Patient transported with: 
 O2 @ 8 liters

## 2020-08-19 NOTE — ED PROVIDER NOTES
A 28-year-old female with a history of bronchiectasis and morbid obesity presenting for worsening shortness of breath. Patient reports she went out to get her haircut this afternoon. While she was out she began getting more more short of breath. Family member drive her by her primary care doctor's office and she was wearing her normal 4 L. While there they registered a O2 sat of 65%. EMS was called and patient was brought to the emergency department. In transit she was put on CPAP with good response. Patient denies fevers chills or cough. She reports that she has been increasingly swollen in her lower extremities around her abdomen. This is atypical for her. He does have a history of lung disease has had multiple hospitalizations for acute on chronic respiratory failure. The history is provided by the patient. Shortness of Breath This is a recurrent problem. The problem occurs rarely. Associated symptoms include orthopnea and leg swelling. Pertinent negatives include no fever, no headaches, no coryza, no rhinorrhea, no sore throat, no swollen glands, no ear pain, no neck pain, no cough, no sputum production, no hemoptysis, no wheezing, no PND, no chest pain, no syncope, no vomiting, no abdominal pain, no rash, no leg pain and no claudication. It is unknown what precipitated the problem. She has tried ipratropium inhalers for the symptoms. The treatment provided no relief. She has had prior hospitalizations. She has had prior ED visits. She has had prior ICU admissions. Associated medical issues include chronic lung disease and heart failure. Past Medical History:  
Diagnosis Date  Bronchiectasis ([address]  Congestive heart failure (CHF) ([address]  HTN (hypertension)  Inguinal hernia  Pneumonia Past Surgical History:  
Procedure Laterality Date  HX BACK SURGERY    
 HX HYSTERECTOMY Family History:  
Problem Relation Age of Onset  Diabetes Sister  Diabetes Brother Social History Socioeconomic History  Marital status:  Spouse name: Not on file  Number of children: Not on file  Years of education: Not on file  Highest education level: Not on file Occupational History  Not on file Social Needs  Financial resource strain: Not on file  Food insecurity Worry: Not on file Inability: Not on file  Transportation needs Medical: Not on file Non-medical: Not on file Tobacco Use  Smoking status: Never Smoker  Smokeless tobacco: Never Used  Tobacco comment: Has been second hand smoke all her life Substance and Sexual Activity  Alcohol use: Never Frequency: Never  Drug use: Never  Sexual activity: Not on file Lifestyle  Physical activity Days per week: Not on file Minutes per session: Not on file  Stress: Not on file Relationships  Social connections Talks on phone: Not on file Gets together: Not on file Attends [employer] witness service: Not on file Active member of club or organization: Not on file Attends meetings of clubs or organizations: Not on file Relationship status: Not on file  Intimate partner violence Fear of current or ex partner: Not on file Emotionally abused: Not on file Physically abused: Not on file Forced sexual activity: Not on file Other Topics Concern  Not on file Social History Narrative  Not on file ALLERGIES: Pseudoephedrine Review of Systems Constitutional: Negative for fever. HENT: Negative for ear pain, rhinorrhea and sore throat. Respiratory: Positive for shortness of breath. Negative for cough, hemoptysis, sputum production and wheezing. Cardiovascular: Positive for orthopnea and leg swelling. Negative for chest pain, claudication, syncope and PND. Gastrointestinal: Negative for abdominal pain and vomiting. Musculoskeletal: Negative for neck pain. Skin: Negative for rash. Neurological: Negative for headaches. All other systems reviewed and are negative. Vitals:  
 08/19/20 1620 08/19/20 1625 BP: (!) 146/99 Pulse: (!) 103 Resp: (!) 36 Temp: 99.2 °F (37.3 °C) SpO2: 93% 93% Weight: 132.5 kg (292 lb) Height: 5' 2\" (1.575 m) Physical Exam 
Vitals signs and nursing note reviewed. Constitutional:   
   Appearance: She is well-developed. She is obese. HENT:  
   Head: Normocephalic and atraumatic. Eyes:  
   Conjunctiva/sclera: Conjunctivae normal.  
   Pupils: Pupils are equal, round, and reactive to light. Neck: Musculoskeletal: Neck supple. Cardiovascular:  
   Rate and Rhythm: Normal rate and regular rhythm. Pulmonary:  
   Effort: Pulmonary effort is normal.  
   Breath sounds: Examination of the right-upper field reveals decreased breath sounds. Examination of the left-upper field reveals decreased breath sounds. Examination of the right-middle field reveals decreased breath sounds. Examination of the left-middle field reveals decreased breath sounds. Examination of the right-lower field reveals decreased breath sounds. Examination of the left-lower field reveals decreased breath sounds. Decreased breath sounds present. Abdominal:  
   General: Bowel sounds are normal.  
   Palpations: Abdomen is soft. Comments: Edema extending up half of the abdomen Musculoskeletal: Normal range of motion. Right lower leg: Edema present. Left lower leg: Edema present. Skin: 
   General: Skin is warm and dry. Neurological:  
   Mental Status: She is alert and oriented to person, place, and time. MDM Number of Diagnoses or Management Options Acute on chronic respiratory failure with hypoxia ([address]): Acute pulmonary edema [facility]):  
Diagnosis management comments: 44-year-old female presenting for recurrent shortness of breath.   Clinically the patient looks like pulmonary edema and CHF. She has bilateral pitting edema of her legs all the way up to her abdomen and decreased breath sounds bilaterally. She is had no fevers or muscle aches concerning for infectious symptoms or COVID-19. She does have a history of bronchiectasis this is a possibility as well. Amount and/or Complexity of Data Reviewed Clinical lab tests: ordered and reviewed (Results for orders placed or performed during the hospital encounter of 08/19/20 
-CBC WITH AUTOMATED DIFF Result                      Value             Ref Range WBC                         7.8               4.3 - 11.1 K* 
     RBC                         3.93 (L)          4.05 - 5.2 M* HGB                         9.4 (L)           11.7 - 15.4 * HCT                         33.5 (L)          35.8 - 46.3 % MCV                         85.2              79.6 - 97.8 * MCH                         23.9 (L)          26.1 - 32.9 * MCHC                        28.1 (L)          31.4 - 35.0 * RDW                         19.0 (H)          11.9 - 14.6 % PLATELET                    232               150 - 450 K/* MPV                         9.9               9.4 - 12.3 FL ABSOLUTE NRBC               0.00              0.0 - 0.2 K/* DF                          AUTOMATED NEUTROPHILS                 82 (H)            43 - 78 % LYMPHOCYTES                 8 (L)             13 - 44 % MONOCYTES                   8                 4.0 - 12.0 % EOSINOPHILS                 1                 0.5 - 7.8 % BASOPHILS                   0                 0.0 - 2.0 % IMMATURE GRANULOCYTES       0                 0.0 - 5.0 %   
     ABS. NEUTROPHILS            6.4               1.7 - 8.2 K/* ABS. LYMPHOCYTES            0.6               0.5 - 4.6 K/* ABS. MONOCYTES              0.6               0.1 - 1.3 K/* ABS. EOSINOPHILS            0.1               0.0 - 0.8 K/* ABS. BASOPHILS              0.0               0.0 - 0.2 K/* ABS. IMM. GRANS.            0.0               0.0 - 0.5 K/* 
-LIPASE Result                      Value             Ref Range Lipase                      163               73 - 407 U/L  
-METABOLIC PANEL, COMPREHENSIVE Result                      Value             Ref Range Sodium                      142               136 - 145 mm* Potassium                   4.3               3.5 - 5.1 mm* Chloride                    100               98 - 107 mmo* CO2                         39 (H)            21 - 32 mmol* Anion gap                   3 (L)             7 - 16 mmol/L Glucose                     135 (H)           65 - 100 mg/* BUN                         17                8 - 23 MG/DL Creatinine                  1.28 (H)          0.6 - 1.0 MG* 
     GFR est AA                  53 (L)            >60 ml/min/1* GFR est non-AA              44 (L)            >60 ml/min/1* Calcium                     8.3               8.3 - 10.4 M* Bilirubin, total            1.2 (H)           0.2 - 1.1 MG* ALT (SGPT)                  17                12 - 65 U/L   
     AST (SGOT)                  21                15 - 37 U/L Alk. phosphatase            79                50 - 136 U/L Protein, total              6.3               6.3 - 8.2 g/* Albumin                     2.9 (L)           3.2 - 4.6 g/* Globulin                    3.4               2.3 - 3.5 g/* A-G Ratio                   0.9 (L)           1.2 - 3.5     
-MAGNESIUM Result                      Value             Ref Range Magnesium                   2.1               1.8 - 2.4 mg* 
-NT-PRO BNP Result                      Value             Ref Range NT pro-BNP                  5,211 (H)         5 - 125 PG/ML 
-POC VENOUS BLOOD GAS Result                      Value             Ref Range Device:                     CPAP                            
     FIO2 (POC)                  60                %             
     pH, venous (POC)            7.36              7.32 - 7.42   
     pCO2, venous (POC)          74.9 (H)          41 - 51 MMHG  
     pO2, venous (POC)           21 (LL)           mmHg HCO3, venous (POC)          42.0 (H)          23 - 28 MMOL* 
     sO2, venous (POC)           29 (L)            65 - 88 % Base excess, venous (P*     14                mmol/L        
     PEEP/CPAP (POC)             10                cmH2O Allens test (POC)           YES Site                        OTHER Specimen type (POC)         VENOUS BLOOD Performed by                                                
 Araceli 
     CO2, POC                    44                MMOL/L Critical value read ba*     00:01 COLLECT TIME                1,710                           
-EKG, 12 LEAD, INITIAL Result                      Value             Ref Range Ventricular Rate            119               BPM           
     Atrial Rate                 144               BPM           
     QRS Duration                76                ms Q-T Interval                286               ms            
     QTC Calculation (Bezet)     402               ms            
     Calculated R Axis           46                degrees Calculated T Axis           24                degrees Diagnosis                                                   
 !! AGE AND GENDER SPECIFIC ECG ANALYSIS !! Atrial fibrillation with rapid ventricular response Low voltage QRS Abnormal ECG When compared with ECG of 19-JUN-2020 23:06, 
 Nonspecific T wave abnormality no longer evident in Anterior leads Confirmed by BERTRAND GHOSH (), Katie Odonnell ([id number]) on 8/19/2020 5:21:46 PM 
  
) Tests in the radiology section of CPT®: ordered and reviewed (Xr Chest [facility] Result Date: 8/19/2020 EXAM: Single view chest radiograph. INDICATION: Shortness of breath COMPARISON: Chest x-ray, 6/19/2020 FINDINGS: Cardiac silhouette is enlarged, similar to prior. Low lung volumes with bronchovascular crowding. Bibasilar airspace opacities. No pneumothorax or large pleural effusion. No acute osseous abnormality. IMPRESSION: 1. Stable cardiomegaly. 2. Low lung volumes with bronchovascular crowding and bibasilar airspace opacities that could represent atelectasis versus pneumonia in the correct setting. ) Tests in the medicine section of CPT®: ordered and reviewed Decide to obtain previous medical records or to obtain history from someone other than the patient: yes Discuss the patient with other providers: yes (Discussed the case with the hospitalist will assume care) Independent visualization of images, tracings, or specimens: yes (Personally reviewed the patient's imaging) Risk of Complications, Morbidity, and/or Mortality Presenting problems: high Diagnostic procedures: high Management options: high General comments: I personally reviewed the patient's vital signs, laboratory tests, and/or radiological findings. I discussed these findings with the patient and their significance. I answered all questions and explained that given these findings there is significant concern for increased morbidity and/or mortality without immediate intervention. As a result, I recommended admission to the hospital, consulted the appropriate service, and transitioned care to that service in improved condition Patient Progress Patient progress: improved ED Course as of Aug 19 1819 Wed Aug 19, 2020  
1745 Patient's shortness of breath seems to be secondary to pulmonary edema and fluid overload. [JS] 1750 Patient maintaining sats at 93% with CPAP on 60% FiO2.  
 [JS] [phone] Reevaluated the patient she states she is feeling much better. We will attempt to wean off of CPAP to high flow. [JS] ED Course User Index [JS] Milly Shi MD  
 
 
Procedures

## 2020-08-19 NOTE — ED TRIAGE NOTES
EMS called out for respiratory distress. Pt was found in car on 4 L oxygen via [place] outside of her doctor's office. Sp02 60% on 4 L via [place] upon EMS arrival. Diminished lung sounds. EMS put pt on CPAP at 5 cm of pressure and Sp02 improved to 99%. Last EMS BP 98/50 and . Pt has Hx of edema, HTN. Pt denies Hx of CHF or COPD to EMS. Pt does have Hx of Afib. EMS started 20 g left A/C. Pt denies any CP. Pt reports bilateral leg pain from \"all the swelling\" and \"feeling pressure and real full in my stomach where the fluid is pushing against my hernia. \" Pt denies fever, body aches, chills, n/v/d, sore throat, cough or congestion. Pt is noted to have bilateral pitting edema. Pt has Hx of bronchiectasis. Dr. Bradhsaw Fees in room during triage for assessment.

## 2020-08-19 NOTE — PROGRESS NOTES
TRANSFER - IN REPORT: 
 
Verbal report received from Kristyn rn(name) on   being received from ED(unit) for routine progression of care Report consisted of patients Situation, Background, Assessment and  
Recommendations(SBAR). Information from the following report(s) SBAR, Kardex, ED Summary, Intake/Output, MAR and Recent Results was reviewed with the receiving nurse. Opportunity for questions and clarification was provided. Assessment to be completed upon patients arrival to unit and care assumed.

## 2020-08-20 PROBLEM — I27.20 MODERATE TO SEVERE PULMONARY HYPERTENSION (HCC): Status: ACTIVE | Noted: 2020-01-01

## 2020-08-20 PROBLEM — I48.91 ATRIAL FIBRILLATION (HCC): Chronic | Status: ACTIVE | Noted: 2020-01-01

## 2020-08-20 PROBLEM — J47.9 BRONCHIECTASIS (HCC): Chronic | Status: ACTIVE | Noted: 2020-01-01

## 2020-08-20 PROBLEM — E66.2 OBESITY HYPOVENTILATION SYNDROME (HCC): Chronic | Status: ACTIVE | Noted: 2020-01-01

## 2020-08-20 PROBLEM — I50.31 ACUTE DIASTOLIC CHF (CONGESTIVE HEART FAILURE) (HCC): Status: ACTIVE | Noted: 2020-01-01

## 2020-08-20 NOTE — PROGRESS NOTES
08/19/20 2145 Dual Skin Pressure Injury Assessment Dual Skin Pressure Injury Assessment WDL Second Care Provider (Based on [address] [place] [address]) Lizzy Desai Skin Integumentary Skin Integumentary (WDL) X Pressure  Injury Documentation No Pressure Injury Noted-Pressure Ulcer Prevention Initiated Skin Color Serafin Skin Condition/Temp Warm; Inflamed Skin Integrity Excoriation;Scars (comment) Turgor Shiney/hard Hair Growth Present Nails X Varicosities Present Exceptions to WDL Discolored; Thick

## 2020-08-20 NOTE — PROGRESS NOTES
Chart review for home NIV order Patient has ABG with PaCO2 of 74.9. Patient with Chronic Respiratory Failure due to COPD requires nocturnal and daytime targeted volume ventilation. Patient requires NIV 8-24 hours/day via face mask for respiratory failure and rapidly changing lung volumes as well as MPV preventing daytime exacerbations and illness. Alternative methods of ventilatory support have been tried such as a bi-level device, bi-level with back-up rate and failed to maintain patient's PCO2 levels to a safe and manageable range. Patient required targeted volumes, back up rate and battery with alarms. This patient is at risk for exacerbations leading quickly to possible decline and harm without this therapy. In patients with severe chronic hypercapnia and a history of hospitalization for acute respiratory failure, long-term non-invasive ventilation may decrease mortality and prevent re-hospitalization. Recommended settings: 
 
Target Vt : 355 ml Rate : 8-15 Max Insp. Pressure : [id number] 
EPAP/PEEP: 5-10 cmH2O Pressure Support Min : 4 Pressure Support Max : 22 O2 bleed in 4LPM

## 2020-08-20 NOTE — PROGRESS NOTES
08/19/20 2243 Oxygen Therapy O2 Sat (%) 100 % Pulse via Oximetry 93 beats per minute O2 Device CPAP mask 
(V60) FIO2 (%) 60 % Respiratory Respiratory (WDL) X  
CPAP/BIPAP  
CPAP/BIPAP Start/Stop On Device Mode CPAP  
$$ CPAP Daily Yes Bio[employer] ID # [id number] Mask Type and Size Full face; Medium Skin Condition intact PIP Observed 10 cm H20  
EPAP (cm H2O) 10 cm H2O Vt Spont (ml) 430 ml Ve Observed (l/min) 9.7 l/min Backup Rate 8 Total RR (Spontaneous) 31 breaths per minute Leak (Estimated) 8 L/min  
Pt's Home Machine No  
Biomedical Check Performed Yes Settings Verified Yes Alarm Settings High Pressure 50 Low Pressure 5 Apnea 20 Low Ve 2 High Rate 50 Low Rate 8 Pulmonary Toilet Pulmonary Toilet H. O.B elevated Patient placed on CPAP (V60) for QHS and resting comfortably at this time. Will continue to monitor. Nurse is aware. Heated HFNC (AirVo) remains at bedside for daytime use.

## 2020-08-20 NOTE — CONSULTS
CONSULT NOTE Ralph Gilliam 8/20/2020 Date of Admission:  8/19/2020 The patient's chart is reviewed and the patient is discussed with the staff. Subjective:  
 
Patient is a 78 y.o.  female seen and evaluated at the request of Dr. Divine Nair. She was admitted with hypoxia (sat of 60% on 4 liters oxygen). She was initially on bipap but has been weaned to 60% airvo. She describes a gradual increase in her dyspnea and lack of mobility due to shortness of breath, dating back to December. She was  Hospitalized in May and June with volume overload. Her echo in June reported a normal EF but her RV was dilated and her RVSP measured 75 to 80 mmHg. She is maintained on 4 liters of oxygen. She is morbidly obese and a sleep study has been ordered but she states that the office has not contacted her yet. She reports daytime fatigue and somnolence. She was seen in our office on August 6 and she complained of edema and dyspnea then. Her lasix was increased from 40 mg per day to BID. She was still on this dose at the time of this admission. She does not weigh herself nor limit her fluid intake and she feels very edematous now. Her BNP was 5211 on admission and she was hypertensive. She is currently on 40 mg lasix IV per day and her fluid balance is neg 1.1 liters. She has a history of bronchiectasis which has been stable. Her PFTs this month showed pure restriction. Review of Systems A comprehensive review of systems was negative except for: Constitutional: positive for fatigue Eyes: positive for contacts/glasses Ears, nose, mouth, throat, and face: positive for none Respiratory: positive for dyspnea on exertion Cardiovascular: positive for lower extremity edema, dyspnea on exertion Gastrointestinal: positive for none Genitourinary: positive for none Integument/breast: positive for none Hematologic/lymphatic: positive for none Musculoskeletal: positive for none Neurological: positive for none Behvioral/Psych: positive for sleep disturbance Endocrine: positive for none Allergic/Immunologic: positive for none Patient Active Problem List  
Diagnosis Code  [facility] J47.9  Hypertension I10  Atrial fibrillation ([facility]) I48.91  
 Chronic respiratory failure with hypoxia ([facility]) J96.11  
 Suspected sleep apnea R29.818  Obesity hypoventilation syndrome ([facility]) E66.2  Acute on chronic respiratory failure with hypoxia ([facility]) J96.21 Prior to Admission Medications Prescriptions Last Dose Informant Patient Reported? Taking? Oxygen   Yes No  
SiLPM  
acetaminophen (TYLENOL) 325 mg tablet   No No  
Sig: Take 2 Tabs by mouth every six (6) hours as needed for Pain. albuterol (PROVENTIL HFA, VENTOLIN HFA, PROAIR HFA) 90 mcg/actuation inhaler   No No  
Sig: Take 1 Puff by inhalation every four (4) hours as needed for Wheezing or Shortness of Breath. aspirin 81 mg chewable tablet   Yes No  
Sig: Take 81 mg by mouth daily. dabigatran etexilate (Pradaxa) 150 mg capsule   No No  
Sig: Take 1 Cap by mouth every twelve (12) hours. dilTIAZem ER (CARDIZEM CD) 120 mg capsule   No No  
Sig: Take 1 Cap by mouth daily. ergocalciferol (Vitamin D2) 1,250 mcg (50,000 unit) capsule   Yes No  
Sig: Take 50,000 Units by mouth every seven (7) days. Taken on , per patient  
furosemide (LASIX) 40 mg tablet   Yes No  
Sig: Take  by mouth daily. loratadine (Claritin) 10 mg tablet   Yes No  
Sig: Take 10 mg by mouth daily as needed for Allergies. losartan (COZAAR) 50 mg tablet   No No  
Sig: Take 1 Tab by mouth daily. Indications: high blood pressure  
metoprolol tartrate (LOPRESSOR) 100 mg IR tablet   No No  
Sig: Take 1 Tab by mouth every twelve (12) hours. potassium chloride (K-DUR, KLOR-CON) 20 mEq tablet   Yes No  
Sig: Take  by mouth two (2) times a day. Facility-Administered Medications: None Past Medical History:  
Diagnosis Date  Bronchiectasis ([facility]  Congestive heart failure (CHF) ([address]  HTN (hypertension)  Inguinal hernia  Pneumonia Active Ambulatory Problems Diagnosis Date Noted  Bronchiectasis ([address] 05/05/2020  Hypertension 05/05/2020  Atrial fibrillation ([facility] 06/20/2020  Chronic respiratory failure with hypoxia ([facility] 06/20/2020  Suspected sleep apnea 06/20/2020  Obesity hypoventilation syndrome ([address] 06/23/2020 Resolved Ambulatory Problems Diagnosis Date Noted  Pneumonia 05/04/2020  Suspected COVID-19 virus infection 05/05/2020  Hypertensive urgency 06/20/2020  Acute respiratory failure with hypoxia and hypercarbia ([address] 06/20/2020  Edema due to hypervolemia 06/20/2020  Acute hypoxemic respiratory failure ([facility] 06/21/2020 Past Medical History:  
Diagnosis Date  Congestive heart failure (CHF) ([facility]  HTN (hypertension)  Inguinal hernia Past Surgical History:  
Procedure Laterality Date  HX BACK SURGERY    
 HX HYSTERECTOMY Social History Socioeconomic History  Marital status:  Spouse name: Not on file  Number of children: Not on file  Years of education: Not on file  Highest education level: Not on file Occupational History  Not on file Social Needs  Financial resource strain: Not on file  Food insecurity Worry: Not on file Inability: Not on file  Transportation needs Medical: Not on file Non-medical: Not on file Tobacco Use  Smoking status: Never Smoker  Smokeless tobacco: Never Used  Tobacco comment: Has been second hand smoke all her life Substance and Sexual Activity  Alcohol use: Never Frequency: Never  Drug use: Never  Sexual activity: Not on file Lifestyle  Physical activity Days per week: Not on file Minutes per session: Not on file  Stress: Not on file Relationships  Social connections Talks on phone: Not on file Gets together: Not on file Attends Rastafarian service: Not on file Active member of club or organization: Not on file Attends meetings of clubs or organizations: Not on file Relationship status: Not on file  Intimate partner violence Fear of current or ex partner: Not on file Emotionally abused: Not on file Physically abused: Not on file Forced sexual activity: Not on file Other Topics Concern  Not on file Social History Narrative . Lives with  Family History Problem Relation Age of Onset  Diabetes Sister  Diabetes Brother Allergies Allergen Reactions  Pseudoephedrine Other (comments) \"gave me high blood pressure\" Current Facility-Administered Medications Medication Dose Route Frequency  sodium chloride (NS) flush 5-40 mL  5-40 mL IntraVENous Q8H  
 sodium chloride (NS) flush 5-40 mL  5-40 mL IntraVENous PRN  
 acetaminophen (TYLENOL) tablet 650 mg  650 mg Oral Q6H PRN Or  
 acetaminophen (TYLENOL) suppository 650 mg  650 mg Rectal Q6H PRN  polyethylene glycol (MIRALAX) packet 17 g  17 g Oral DAILY PRN  promethazine (PHENERGAN) tablet 12.5 mg  12.5 mg Oral Q6H PRN Or  
 ondansetron (ZOFRAN) injection 4 mg  4 mg IntraVENous Q6H PRN  
 acetaminophen (TYLENOL) tablet 650 mg  650 mg Oral Q6H PRN  
 albuterol (PROVENTIL VENTOLIN) nebulizer solution 2.5 mg  2.5 mg Nebulization Q4H PRN  
 aspirin chewable tablet 81 mg  81 mg Oral DAILY  dabigatran etexilate (PRADAXA) capsule 150 mg  150 mg Oral Q12H  
 dilTIAZem ER (CARDIZEM CD) capsule 120 mg  120 mg Oral DAILY  furosemide (LASIX) injection 40 mg  40 mg IntraVENous BID  losartan (COZAAR) tablet 50 mg  50 mg Oral DAILY  metoprolol tartrate (LOPRESSOR) tablet 100 mg  100 mg Oral Q12H  potassium chloride (K-DUR, KLOR-CON) SR tablet 40 mEq  40 mEq Oral BID  nystatin (MYCOSTATIN) 100,000 unit/gram powder   Topical BID Objective: Vitals:  
 08/20/20 0339 08/20/20 0800 08/20/20 0852 08/20/20 1150 BP: 136/86 136/77  107/69 Pulse: 91 88  97 Resp: 24 22  23 Temp: 97.9 °F (36.6 °C) 98 °F (36.7 °C)  97.9 °F (36.6 °C) SpO2: 96% 95% 94% 94% Weight:      
Height: PHYSICAL EXAM  
 
Constitutional:  the patient is well developed and in no acute distress HEENT:  Sclera clear, pupils equal, oral mucosa moist 
Lungs: crackles from posterior. Wearing 60% airvo device Cardiovascular:  RRR without M,G,R 
Abd/GI: soft and non-tender; with positive bowel sounds. Ext: warm without cyanosis. There is 2+ generalized edema to include anasarca. Skin:  no jaundice or rashes, no wounds Neuro: no gross neuro deficits. Alert and oriented Musculoskeletal: moves all four extremities. No deformities. Psychiatric: calm. Does not appear anxious or depressed Chest X-ray:   
 
Echo: Left ventricle: Systolic function was normal. Ejection fraction was 
estimated in the range of 50 % to 55 %. There were no regional wall motion 
abnormalities. There was mild concentric hypertrophy.  
-  Right ventricle: The ventricle was dilated.  RVSP 75-80 mmHg -  Left atrium: The atrium was moderately dilated.  
-  Right atrium: The atrium was moderately to markedly dilated.  
-  Inferior vena cava, hepatic veins: The inferior vena cava was dilated. The 
respirophasic change in diameter was less than 50%.   
-  Mitral valve: There was mild regurgitation.  
-  Tricuspid valve: There was moderate regurgitation. Recent Labs  
  08/20/20 
6296 08/19/20 
1629 WBC 7.5 7.8 HGB 9.2* 9.4* HCT 31.7* 33.5*  
 232 INR  --  1.4 Recent Labs  
  08/20/20 
7631 08/19/20 
1629  142  
K 4.1 4.3  100 GLU 81 135* CO2 35* 39* BUN 17 17 CREA 1.08* 1.28* MG 2.1 2.1 CA 8.3 8.3 ALB  --  2.9* Assessment:  (Medical Decision Making) Hospital Problems  Date Reviewed: 8/6/2020 Codes Class Noted POA * (Principal) Acute on chronic respiratory failure with hypoxia (HCC) ICD-10-CM: J96.21 
ICD-9-CM: 518.84, 799.02  8/19/2020 Yes Obesity hypoventilation syndrome (HCC) (Chronic) ICD-10-CM: X73.3 ICD-9-CM: 278.03  6/23/2020 Yes Atrial fibrillation (HCC) (Chronic) ICD-10-CM: I48.91 
ICD-9-CM: 427.31  6/20/2020 Yes Bronchiectasis ([address] (Chronic) ICD-10-CM: J47.9 ICD-9-CM: 494.0  5/5/2020 Yes Hypertension (Chronic) ICD-10-CM: I10 
ICD-9-CM: 401.9  5/5/2020 Yes Plan:  (Medical Decision Making) 1. She has acute diastolic heart failure with severe pulmonary hypertension. Continue IV lasix and monitor balance. She is neg 1 liter so far. Daily weight ordered. Discussed need for her to do this at home in addition to limiting her oral fluid intake. 2. I have sent a message to the office to arrange for sleep study ASAP (will try to get done as soon after discharge as possible). Will order autoset CPAP for use here and monitor willingness 3. Control blood pressure and heart rate 4. Will need follow up echo in a few months to reassess pulmonary hypertension. She needs to be euvolemic and suspected sleep apnea addressed 5. Reassess oxygen needs at discharge Becki Vieyra, NP Lungs: decreased breath sounds Heart:  RRR with no Murmur/Rubs/Gallops, 2+ edema Additional Comments:    Continue diuresis, cpap hs I have spoken with and examined the patient. I agree with the above assessment and plan as documented. James Castrejon MD 
 
 
 
More than 50% of time documented was spent face-to-face contact with the patient and in the care of the patient on the floor/unit where the patient is located

## 2020-08-20 NOTE — PROGRESS NOTES
Problem: Patient Education: Go to Patient Education Activity Goal: Patient/Family Education Outcome: Progressing Towards Goal 
Note: 1. Patient will complete total body bathing and dressing with modified independence and adaptive equipment as needed. 2. Patient will complete toileting with modified independence and adaptive equipment as needed. 3. Patient will tolerate 20 minutes of OT treatment with self-incorporated rest breaks to increase activity tolerance for ADLs. 4. Patient will complete functional transfers with modified independence and adaptive equipment as needed. 5. Patient will complete functional mobility for ADLs with modified independence and adaptive equipment as needed. 6. Patient will verbalize 2 energy conservation techniques with no cues from therapist to increase safety and independence with ADLs. Timeframe: 7 visits OCCUPATIONAL THERAPY: Initial Assessment, Daily Note, and PM 8/20/2020 INPATIENT: OT Visit Days: 1 [place] / [place] / Product Type: Medicare /  
  
NAME/AGE/GENDER: Stephon Chandler is a 78 y.o. female PRIMARY DIAGNOSIS:  Acute on chronic respiratory failure with hypoxia (HCC) [J96.21] Acute on chronic respiratory failure with hypoxia (HCC) Acute on chronic respiratory failure with hypoxia (HCC) ICD-10: Treatment Diagnosis:  
 · Generalized Muscle Weakness (M62.81) Precautions/Allergies: 
  Pseudoephedrine ASSESSMENT:  
 
Ms. Elizabeth Sidhu is a 78 y.o. female admitted with SOB, respiratory failure. Hx pulmonary hypertension, chronic hypoxic respiratory failure. Has been on 4L supplemental O2 since June. At baseline pt lives with spouse and reports independence with ADLs, though reports she has had increased difficulty with ADLs over the last year. Independent with household ambulation and driving, utilizes w/c for MD visits.   
 
Upon arrival pt alert and agreeable to OT evaluation and treatment, O2 sats 90% on Airvo. BUE assessment revealed AROM and strength generally decreased in BUEs. Pt completed bed mobility with Elle/additional time, cues throughout for breathing technique and pacing. Fair to good static sitting balance. Total assist to don socks. Educated on skin integrity management, pt verbalized understanding. Could benefit from AD training for lower body ADLs. Functional transfers with CGA/cues for hand placement/safety, ambulation for ADLs with RW/CGA. Cues for pacing. Pt left seated in chair with call bell within reach. Pt presents with deficits in SOB, strength, activity tolerance, balance, ambulation and transfers. Maite Henedrson is currently functioning below baseline and would benefit from continued OT to increase safety and independence with ADLs. Will follow. This section established at most recent assessment PROBLEM LIST (Impairments causing functional limitations): 1. Decreased Strength 2. Decreased ADL/Functional Activities 3. Decreased Transfer Abilities 4. Decreased Ambulation Ability/Technique 5. Decreased Balance 6. Decreased Activity Tolerance 7. Decreased Pacing Skills 8. Decreased Work Simplification/Energy Conservation Techniques 9. Increased Fatigue 10. Increased Shortness of Breath 11. Edema/Girth 12. Decreased [place] with Home Exercise Program 
 INTERVENTIONS PLANNED: (Benefits and precautions of occupational therapy have been discussed with the patient.) 1. Activities of daily living training 2. Adaptive equipment training 3. Balance training 4. Clothing management 5. Community reintergration 6. Donning&doffing training 7. Hygiene training 8. Neuromuscular re-eduation 9. Re-evaluation 10. Therapeutic activity 11. Therapeutic exercise TREATMENT PLAN: Frequency/Duration: Follow patient 3x/week to address above goals. Rehabilitation Potential For Stated Goals: Good REHAB RECOMMENDATIONS (at time of discharge pending progress):   
 Placement: It is my opinion, based on this patient's performance to date, that Ms. Boswell may benefit from participating in 1-2 additional therapy sessions in order to continue to assess for rehab potential and then make recommendation for disposition at discharge. Equipment: ? TBD, may need RW  
    
 
 
 
OCCUPATIONAL PROFILE AND HISTORY:  
History of Present Injury/Illness (Reason for Referral): 
79 yo CF with past history of bronchiectasis, JEANNE complicated by pulmonary hypertension with chronic hypoxic respiratory failure (on 4L oxygen at home), Afib (on Pradaxa) presents with worsening shortness of breath that started when she went out to get a haircut today. She denies associated fevers/chills, chest pain, worsening productive cough, abdominal pain, nausea/vomiting or diarrhea. Patient was outside her PCP office with SpO2 of 60% on her home 4L. She was transported via EMS and placed on BiPAP. At bedside, patient says she has not missed any doses of her medications \"except for my potassium. \"  
  
ED Course: patient weaned from CPAP to Airvo 60L and FiO2 of 70%. LA of 2.3 with SCr of 1.3 (baseline around 0.9). Blood cultures collected x2. EKG showing Afib with RVR with HR of 119 bpm. Given albuterol nebulizer and Lasix 40 mg IV once. Hospitalist called for admission. Past Medical History/Comorbidities: Ms. Jam Bee  has a past medical history of Bronchiectasis ([address] Congestive heart failure (CHF) ([facility] [address] HTN (hypertension), Inguinal hernia, and Pneumonia. Ms. Jam Bee  has a past surgical history that includes hx hysterectomy and hx back surgery. Social History/Living Environment:  
Home Environment: Private residence # Steps to Enter: 6 Rails to Enter: Yes Hand Rails : Bilateral 
One/Two Story Residence: One story(Doesn't access basement) # of Interior Steps: 25 Living Alone: No 
Support Systems: Spouse/Significant Other/Partner Patient Expects to be Discharged to[de-identified] Private residence Current DME Used/Available at Home: Oxygen, portable Tub or Shower Type: Tub/Shower combination Prior Level of Function/Work/Activity: Hx pulmonary hypertension, chronic hypoxic respiratory failure. Has been on 4L supplemental O2 since June. At baseline pt lives with spouse and reports independence with ADLs, though reports she has had increased difficulty with ADLs over the last year. Independent with household ambulation and driving, utilizes w/c for MD visits. Personal Factors:   
      Sex:  female Age:  78 y.o. Other factors that influence how disability is experienced by the patient:  Multiple co-morbidities Number of Personal Factors/Comorbidities that affect the Plan of Care: Expanded review of therapy/medical records (1-2):  MODERATE COMPLEXITY ASSESSMENT OF OCCUPATIONAL PERFORMANCE[de-identified]  
Activities of Daily Living:  
Basic ADLs (From Assessment) Complex ADLs (From Assessment) Feeding: Independent Oral Facial Hygiene/Grooming: Setup Bathing: Moderate assistance Upper Body Dressing: Setup Lower Body Dressing: Maximum assistance Toileting: Moderate assistance Instrumental ADL Meal Preparation: Total assistance Homemaking: Total assistance Grooming/Bathing/Dressing Activities of Daily Living Cognitive Retraining Safety/Judgement: Awareness of environment; Fall prevention; Insight into deficits Lower Body Dressing Assistance Socks: Total assistance (dependent) Bed/Mat Mobility Supine to Sit: Minimum assistance; Additional time Sit to Stand: Contact guard assistance; Additional time Stand to Sit: Contact guard assistance; Additional time Bed to Chair: Contact guard assistance Scooting: Stand-by assistance; Additional time Most Recent Physical Functioning:  
Gross Assessment: 
AROM: Generally decreased, functional(BUEs) Strength: Generally decreased, functional(BUEs) Coordination: Within functional limits(BUEs) Posture: 
  
Balance: Sitting: Impaired Sitting - Static: Good (unsupported); Fair (occasional); Prop sitting Sitting - Dynamic: Fair (occasional); Prop sitting Standing: Impaired Standing - Static: Fair;Constant support Standing - Dynamic : Fair;Constant support Bed Mobility: 
Supine to Sit: Minimum assistance; Additional time Scooting: Stand-by assistance; Additional time Wheelchair Mobility: 
  
Transfers: 
Sit to Stand: Contact guard assistance; Additional time Stand to Sit: Contact guard assistance; Additional time Bed to Chair: Contact guard assistance Interventions: Safety awareness training; Tactile cues; Verbal cues Patient Vitals for the past 6 hrs: 
 BP BP Patient Position SpO2 Pulse 20 1150 107/69 At rest 94 % 97  
20 1415   90 %  Mental Status Neurologic State: Alert Orientation Level: Oriented X4 Cognition: Appropriate decision making, Appropriate for age attention/concentration, Follows commands Perception: Appears intact Perseveration: No perseveration noted Safety/Judgement: Awareness of environment, Fall prevention, Insight into deficits Physical Skills Involved: 1. Range of Motion 2. Balance 3. Strength 4. Activity Tolerance 5. Gross Motor Control 6. Edema Cognitive Skills Affected (resulting in the inability to perform in a timely and safe manner): 1. None Psychosocial Skills Affected: 1. Habits/Routines 2. Environmental Adaptation 3. Social Interaction 4. Emotional Regulation 5. Self-Awareness 6. Awareness of Others 7. Social Roles Number of elements that affect the Plan of Care: 5+:  HIGH COMPLEXITY CLINICAL DECISION MAKIN [facility] [address] [id number] AM-PAC 6 Clicks Daily Activity Inpatient Short Form How much help from another person does the patient currently need. .. Total A Lot A Little None 1. Putting on and taking off regular lower body clothing?    [] 1   [x] 2   [] 3   [] 4  
 2.  Bathing (including washing, rinsing, drying)? [] 1   [x] 2   [] 3   [] 4  
3. Toileting, which includes using toilet, bedpan or urinal?   [] 1   [x] 2   [] 3   [] 4  
4. Putting on and taking off regular upper body clothing? [] 1   [] 2   [x] 3   [] 4  
5. Taking care of personal grooming such as brushing teeth? [] 1   [] 2   [x] 3   [] 4  
6. Eating meals? [] 1   [] 2   [] 3   [x] 4  
© 2007, Trustees of [facility] [employer], under license to [employer]. All rights reserved Score:  Initial: 16 8/20/2020 Most Recent: X (Date: -- ) Interpretation of Tool:  Represents activities that are increasingly more difficult (i.e. Bed mobility, Transfers, Gait). Medical Necessity:    
· Patient demonstrates good rehab potential due to higher previous functional level. Reason for Services/Other Comments: 
· Patient continues to require skilled intervention due to inability to complete ADLs at prior level of independence. Use of outcome tool(s) and clinical judgement create a POC that gives a: MODERATE COMPLEXITY  
 
 
 
TREATMENT:  
(In addition to Assessment/Re-Assessment sessions the following treatments were rendered) Pre-treatment Symptoms/Complaints:   
Pain: Initial:  
Pain Intensity 1: 0  Post Session:  same Today's treatment session addressed Decreased Strength, Decreased ADL/Functional Activities, Decreased Transfer Abilities, Decreased Ambulation Ability/Technique, Decreased Balance, Decreased Activity Tolerance, Decreased Pacing Skills, Decreased Work Simplification/Energy Conservation Techniques, Increased Fatigue, Increased Shortness of Breath, Decreased Flexibility/Joint Mobility and Edema/Girth to progress towards achieving goal(s). During this session,  Physical Therapy addressed  Ambulation to progress towards their discipline specific goal(s).   Co-treatment was necessary to improve patient's ability to follow higher level commands, ability to increase activity demands and ability to return to normal functional activity. This patient is appropriate for co-treatment at this time due to multiple deficits including decreased balance, increased SOB, decreased cardiopulmonary endurance, decreased strength, and need for high level cueing to complete functional transfers and functional tasks. Self Care: (8 minutes): Procedure(s) (per grid) utilized to improve and/or restore self-care/home management as related to dressing and energy conservation and pacing. Required minimal visual, verbal and tactile cueing to facilitate activities of daily living skills. Cues throughout for breathing technique and pacing. Fair to good static sitting balance. Total assist to don socks. Could benefit from AD training for lower body ADLs. Therapeutic Activity: (    15 minutes): Therapeutic activities including Bed transfers, Chair transfers, Ambulation on level ground and RW management to improve mobility, strength, balance, coordination and activity tolerance. Required minimal Safety awareness training; Tactile cues; Verbal cues to promote dynamic balance in standing and promote coordination of bilateral, upper extremity(s), lower extremity(s). Pt completed bed mobility with Elle/additional time, Functional transfers with CGA/cues for hand placement/safety, ambulation for ADLs with RW/CGA. Cues for pacing. Braces/Orthotics/Lines/Etc:  
· elam catheter · O2 Device: Heated, Hi flow nasal cannula Treatment/Session Assessment:   
Response to Treatment:  Tolerated well · Interdisciplinary Collaboration:  
o Physical Therapist 
o Occupational Therapist 
o Registered Nurse · After treatment position/precautions:  
o Up in chair 
o Bed/Chair-wheels locked 
o Bed in low position 
o Call light within reach 
o RN notified 
o Zohreh riley · Compliance with Program/Exercises: Compliant all of the time, Will assess as treatment progresses. · Recommendations/Intent for next treatment session: \"Next visit will focus on advancements to more challenging activities and reduction in assistance provided\". Total Treatment Duration: OT Patient Time In/Time Out Time In: 4500 Time Out: 0834 Belgica Lai, OTR/L

## 2020-08-20 NOTE — PROGRESS NOTES
Problem: Mobility Impaired (Adult and Pediatric) Goal: *Acute Goals and Plan of Care (Insert Text) Outcome: Progressing Towards Goal 
Note: ACUTE PT GOALS: 
1. Patient will perform bed mobility with MODIFIED INDEPENDENCE within 7 days. 2. Patient will transfer bed to chair with MODIFIED INDEPENDENCE within 7 days. 3. Patient will demonstrate FAIR+ DYNAMIC STANDING balance within 7 day(s). 4. Patient will ambulate 75ft+ using least restrictive assistive device and SUPERVISION within 7 days. 5. Patient will tolerate 25+ minutes of therapeutic activity/exercise and/or neuromuscular re-education while maintaining stable vitals to improve functional strength and activity tolerance within 7 days. PHYSICAL THERAPY: Initial Assessment, Daily Note, and PM 8/20/2020 INPATIENT: PT Visit Days : 1 [place] / Product Type: Medicare /   
  
NAME/AGE/GENDER: Nadine Briceno is a 78 y.o. female PRIMARY DIAGNOSIS: Acute on chronic respiratory failure with hypoxia (HCC) [J96.21] Acute on chronic respiratory failure with hypoxia (HCC) Acute on chronic respiratory failure with hypoxia (HCC) ICD-10: Treatment Diagnosis:  
 Generalized Muscle Weakness (M62.81) Difficulty in walking, Not elsewhere classified (R26.2) Precaution/Allergies: 
Pseudoephedrine ASSESSMENT:  
 
Ms. Abraham De Souza is a pleasant 78year old female admitted with acute on chronic respiratory failure and seen this PM for initial physical therapy evaluation: presents supine in bed, oriented x4, on 60% Airvo, and endorses 0/10 pain. Patient lives with her spouse in a two story residence, residing on first level, with 6 steps to enter. At baseline, patient ambulates modified household level distances without utilizing an assistive device and utilizes a motorized scooter for community level distances.  Patient endorses poor activity tolerance with ADLs, IADLs, and community-level ambulation. Chronically on 4L O2. Today, decreased AROM B LE secondary to 2+ pitting edema up to mid thigh, B LE with generalized functional weakness 4-/5, and sensation is diminished to light touch distal B LEs. Addressed pre gait activity including, sit to stand training with verbal/visual/tactile cueing x4, lateral stepping 2x5ft with RW and verbal/tactile cues for assistive device utilization. Gait training with RW x10ft to chair. Patient demonstrated a slow, step-to gait pattern with shortened step length bilaterally and fair dynamic balance throughout. Increased work of breathing with activity; no c/o dizziness throughout; Sp02 >90-95% in sitting post activity. Ms. Rick De Jesus presents with decreased functional activity tolerance, decreased functional mobility, and decreased balance/gait status. Anticipate HHPT at discharge. Will follow during acute stay with stated plan of care and update recommendations according to clinical course. This section established at most recent assessment PROBLEM LIST (Impairments causing functional limitations): 
Decreased Strength Decreased ADL/Functional Activities Decreased Transfer Abilities Decreased Ambulation Ability/Technique Decreased Balance Increased Pain Decreased Activity Tolerance Decreased Pacing Skills Decreased Work Simplification/Energy Conservation Techniques Decreased Flexibility/Joint Mobility INTERVENTIONS PLANNED: (Benefits and precautions of physical therapy have been discussed with the patient.) Balance Exercise Bed Mobility Family Education Gait Training Home Exercise Program (HEP) Neuromuscular Re-education/Strengthening Range of Motion (ROM) Therapeutic Activites Therapeutic Exercise/Strengthening Transfer Training TREATMENT PLAN: Frequency/Duration: 3 times a week for duration of hospital stay Rehabilitation Potential For Stated Goals: Good REHAB RECOMMENDATIONS (at time of discharge pending progress):   
 Placement: It is my opinion, based on this patient's performance to date, that Ms. Boswell may benefit from [address] after discharge due to the functional deficits listed above that are likely to improve with skilled rehabilitation because he/she has multiple medical issues that affect his/her functional mobility in the community. Equipment:  
TBD; may need RW  
    
 
 
 
HISTORY:  
History of Present Injury/Illness (Reason for Referral): Difficulty in walking; Generalized weakness Past Medical History/Comorbidities: Ms. Carlos Shahid  has a past medical history of Bronchiectasis ([address] Congestive heart failure (CHF) ([facility] [address] HTN (hypertension), Inguinal hernia, and Pneumonia. Ms. Carlos Shahid  has a past surgical history that includes hx hysterectomy and hx back surgery. Social History/Living Environment:  
Home Environment: Private residence # Steps to Enter: 6 Rails to Enter: Yes Hand Rails : Bilateral 
One/Two Story Residence: Two story, live on 1st floor # of Interior Steps: 25 Living Alone: No 
Support Systems: Spouse/Significant Other/Partner Patient Expects to be Discharged to[de-identified] Private residence Tub or Shower Type: Tub/Shower combination Prior Level of Function/Work/Activity: 
Patient lives with her spouse in a two story residence, residing on first level, with 6 steps to enter. At baseline, patient ambulates modified household level distances without utilizing an assistive device and utilizes a motorized scooter for community level distances. Patient endorses poor activity tolerance with ADLs, IADLs, and community-level ambulation. Number of Personal Factors/Comorbidities that affect the Plan of Care: 1-2: MODERATE COMPLEXITY EXAMINATION:  
Most Recent Physical Functioning:  
Gross Assessment: 
AROM: Generally decreased, functional(B LEs secondary to edema) Strength: Generally decreased, functional(B LE) Coordination: Within functional limits Sensation: Impaired(Diminished light touch B LE) Posture: 
  
Balance: 
Sitting: Impaired Sitting - Static: Fair (occasional); Prop sitting Sitting - Dynamic: Fair (occasional) Standing: Impaired Standing - Static: Fair(+) Standing - Dynamic : Fair Bed Mobility: 
Supine to Sit: Minimum assistance Scooting: Minimum assistance; Additional time Wheelchair Mobility: 
  
Transfers: 
Sit to Stand: Contact guard assistance;Minimum assistance Stand to Sit: Contact guard assistance;Minimum assistance Interventions: Safety awareness training; Tactile cues; Verbal cues Gait: 
  
Base of Support: Widened Speed/Cinda: Shuffled; Slow Step Length: Right shortened;Left shortened Gait Abnormalities: Decreased step clearance; Step to gait Distance (ft): 20 Feet (ft)(2x5ft and 1x10ft) Assistive Device: Walker, rolling(Recommend Bariatric RW next tx) Interventions: Safety awareness training; Tactile cues; Verbal cues Body Structures Involved: 
Heart Lungs Muscles Body Functions Affected: 
Cardio Respiratory Movement Related Activities and Participation Affected: Mobility Self Care Domestic Life Number of elements that affect the Plan of Care: 4+: HIGH COMPLEXITY CLINICAL PRESENTATION:  
Presentation: Evolving clinical presentation with changing clinical characteristics: MODERATE COMPLEXITY CLINICAL DECISION MAKIN[address] [place] [address] AM-[facility] 6 Clicks Basic Mobility Inpatient Short Form How much difficulty does the patient currently have. .. Unable A Lot A Little None 1. Turning over in bed (including adjusting bedclothes, sheets and blankets)? [] 1   [] 2   [x] 3   [] 4  
2. Sitting down on and standing up from a chair with arms ( e.g., wheelchair, bedside commode, etc.)   [] 1   [] 2   [x] 3   [] 4  
3. Moving from lying on back to sitting on the side of the bed? [] 1   [] 2   [x] 3   [] 4 How much help from another person does the patient currently need. ..  Total A Lot A Little None 4. Moving to and from a bed to a chair (including a wheelchair)? [] 1   [] 2   [x] 3   [] 4  
5. Need to walk in hospital room? [] 1   [] 2   [x] 3   [] 4  
6. Climbing 3-5 steps with a railing? [] 1   [x] 2   [] 3   [] 4  
© 2007, Trustees of [address] [place] [address], under license to [employer]. All rights reserved Score:  Initial: 17 Most Recent: 17 (Date: 8/20/2020) Interpretation of Tool:  Represents activities that are increasingly more difficult (i.e. Bed mobility, Transfers, Gait). Medical Necessity:    
Patient demonstrates  
good 
 rehab potential due to higher previous functional level. Reason for Services/Other Comments: 
Patient continues to require skilled intervention due to Decreased functional activity tolerance and balance/gait status from baseline Daysi Ask Use of outcome tool(s) and clinical judgement create a POC that gives a: Questionable prediction of patient's progress: MODERATE COMPLEXITY  
  
 
 
 
TREATMENT:  
(In addition to Assessment/Re-Assessment sessions the following treatments were rendered) Pre-treatment Symptoms/Complaints:   
Pain: Initial:  
Pain Intensity 1: 0  Post Session:  0/10; endorses comfort in bedside chair Initial Evaluation (untimed charge) Gait Training ( 23 Minutes):  Gait training to improve and/or restore physical functioning as related to mobility, strength, balance, and coordination. Ambulated 20 Feet (ft)(2x5ft and 1x10ft) with   using a Walker, rolling(Recommend Bariatric RW next tx) and minimal Safety awareness training; Tactile cues; Verbal cues related to their sit to stand training, static and dynamic standing balance, lateral stepping, RW negotiation, and ambulation x10ft to chair. Braces/Orthotics/Lines/Etc:  
IV 
elam catheter O2 Device: Heated, Hi flow nasal cannula, Humidifier Treatment/Session Assessment:   
Response to Treatment:  See above.   
Interdisciplinary Collaboration:  
Physical Therapist 
 Occupational Therapist 
Registered Nurse After treatment position/precautions:  
Up in chair Bed/Chair-wheels locked Bed in low position Call light within reach RN notified Educated patient to call for assistance; verbalized understanding; needs within reach Compliance with Program/Exercises: Will assess as treatment progresses Recommendations/Intent for next treatment session: \"Next visit will focus on advancements to more challenging activities and reduction in assistance provided\". Total Treatment Duration: PT Patient Time In/Time Out Time In: 5496 Time Out: 0069 Siddhartha Lamar DPT

## 2020-08-20 NOTE — PROGRESS NOTES
CM reviewed chart and therapy recommendations. No consult received. CM met with patient at bedside while wearing mask and maintaining appropriate social distancing. Demographics, PCP and insurance verified. Reviewed therapy recommendation for therapy and rolling walker. Patient is agreeable to rolling walker, however, she does not want home health services. Order placed, DME report sent and paperwork faxed to [address] [place] [address].  
 
CM will continue to follow for discharge planning. Care Management Interventions PCP Verified by CM: Yes(Tony) Mode of Transport at Discharge: Self(Family) Transition of Care Consult (CM Consult): (Patient decline  recommendation. ) Discharge Durable Medical Equipment: Yes(Rolling walker) Physical Therapy Consult: Yes Occupational Therapy Consult: Yes Speech Therapy Consult: No 
Current Support Network: Own Home, Lives with Spouse Confirm Follow Up Transport: Family Discharge Location Discharge Placement: Home

## 2020-08-20 NOTE — PROGRESS NOTES
Problem: Pressure Injury - Risk of 
Goal: *Prevention of pressure injury Description: Document Greg Scale and appropriate interventions in the flowsheet. Outcome: Progressing Towards Goal 
Note: Pressure Injury Interventions: 
  
 
Moisture Interventions: Absorbent underpads, Check for incontinence Q2 hours and as needed, Limit adult briefs, Minimize layers Activity Interventions: Increase time out of bed, Pressure redistribution bed/mattress(bed type), PT/OT evaluation Mobility Interventions: HOB 30 degrees or less, Pressure redistribution bed/mattress (bed type), PT/OT evaluation Nutrition Interventions: Document food/fluid/supplement intake, Offer support with meals,snacks and hydration Problem: Patient Education: Go to Patient Education Activity Goal: Patient/Family Education Outcome: Progressing Towards Goal 
  
Problem: Falls - Risk of 
Goal: *Absence of Falls Description: Document Whitney Kate Fall Risk and appropriate interventions in the flowsheet. Outcome: Progressing Towards Goal 
Note: Fall Risk Interventions: 
Mobility Interventions: Bed/chair exit alarm, Communicate number of staff needed for ambulation/transfer, OT consult for ADLs, Patient to call before getting OOB, PT Consult for mobility concerns Medication Interventions: Bed/chair exit alarm, Patient to call before getting OOB, Teach patient to arise slowly Elimination Interventions: Bed/chair exit alarm, Call light in reach, Patient to call for help with toileting needs, Stay With Me (per policy), Toilet paper/wipes in reach, Toileting schedule/hourly rounds Problem: Patient Education: Go to Patient Education Activity Goal: Patient/Family Education Outcome: Progressing Towards Goal 
  
Problem: General Medical Care Plan Goal: *Vital signs within specified parameters Outcome: Progressing Towards Goal 
Goal: *Labs within defined limits Outcome: Progressing Towards Goal 
 Goal: *Absence of infection signs and symptoms Outcome: Progressing Towards Goal 
Goal: *Optimal pain control at patient's stated goal 
Outcome: Progressing Towards Goal 
Goal: *Skin integrity maintained Outcome: Progressing Towards Goal 
Goal: *Fluid volume balance Outcome: Progressing Towards Goal 
Goal: *Optimize nutritional status Outcome: Progressing Towards Goal 
Goal: *Anxiety reduced or absent Outcome: Progressing Towards Goal 
Goal: *Progressive mobility and function (eg: ADL's) Outcome: Progressing Towards Goal 
  
Problem: Patient Education: Go to Patient Education Activity Goal: Patient/Family Education Outcome: Progressing Towards Goal

## 2020-08-20 NOTE — PROGRESS NOTES
Spoke with patient regarding refusing attempt to schedule for sleep study prior to readmission. Discussed briefly how sleep disordered breathing may exacerbate HTN and A Fib. Since readmission, patient now qualifies for home Trilogy device. This was discussed with patient, in addition to mention of a return visit to ensure PAP tolerance

## 2020-08-20 NOTE — PROGRESS NOTES
Problem: Pressure Injury - Risk of 
Goal: *Prevention of pressure injury Description: Document Greg Scale and appropriate interventions in the flowsheet. Outcome: Progressing Towards Goal 
Note: Pressure Injury Interventions: 
Sensory Interventions: Assess changes in LOC, Avoid rigorous massage over bony prominences Moisture Interventions: Absorbent underpads, Check for incontinence Q2 hours and as needed Activity Interventions: Increase time out of bed, PT/OT evaluation, Pressure redistribution bed/mattress(bed type) Mobility Interventions: HOB 30 degrees or less, Pressure redistribution bed/mattress (bed type), PT/OT evaluation Nutrition Interventions: Document food/fluid/supplement intake, Offer support with meals,snacks and hydration Friction and Shear Interventions: HOB 30 degrees or less Problem: Patient Education: Go to Patient Education Activity Goal: Patient/Family Education Outcome: Progressing Towards Goal 
  
Problem: Falls - Risk of 
Goal: *Absence of Falls Description: Document Raghav Lewis Fall Risk and appropriate interventions in the flowsheet. Outcome: Progressing Towards Goal 
Note: Fall Risk Interventions: 
Mobility Interventions: Bed/chair exit alarm, Communicate number of staff needed for ambulation/transfer, Patient to call before getting OOB Medication Interventions: Bed/chair exit alarm, Patient to call before getting OOB, Teach patient to arise slowly Elimination Interventions: Bed/chair exit alarm, Call light in reach, Patient to call for help with toileting needs, Toilet paper/wipes in reach, Toileting schedule/hourly rounds, Stay With Me (per policy) Problem: Patient Education: Go to Patient Education Activity Goal: Patient/Family Education Outcome: Progressing Towards Goal 
  
Problem: General Medical Care Plan Goal: *Vital signs within specified parameters Outcome: Progressing Towards Goal 
Goal: *Labs within defined limits Outcome: Progressing Towards Goal 
Goal: *Absence of infection signs and symptoms Outcome: Progressing Towards Goal 
Goal: *Optimal pain control at patient's stated goal 
Outcome: Progressing Towards Goal 
Goal: *Skin integrity maintained Outcome: Progressing Towards Goal 
Goal: *Fluid volume balance Outcome: Progressing Towards Goal 
Goal: *Optimize nutritional status Outcome: Progressing Towards Goal 
Goal: *Anxiety reduced or absent Outcome: Progressing Towards Goal 
Goal: *Progressive mobility and function (eg: ADL's) Outcome: Progressing Towards Goal 
  
Problem: Patient Education: Go to Patient Education Activity Goal: Patient/Family Education Outcome: Progressing Towards Goal 
  
Problem: Patient Education: Go to Patient Education Activity Goal: Patient/Family Education Outcome: Progressing Towards Goal 
  
Problem: Patient Education: Go to Patient Education Activity Goal: Patient/Family Education Outcome: Progressing Towards Goal

## 2020-08-20 NOTE — ROUTINE PROCESS
Sky Duarte [facility]                                                                                                
PATIENT INFORMATION Patient Name: [facility] Acct: [de-identified] Patient MRN: [id number] Address: [address] [place] [id number] Patient CSN: [id number]  
   Protestant: OTHER Sex: Female Marital Status:   
: 1950 Age:   78 yrs Home Phone: [phone] Mobile Phone:   [phone] Race: WHITE [place]  Employer:   Retired Language: ENGLISH Admitted/Arrived From:   Other [348] ADMISSION INFORMATION Admit Date: 2020 Admit Time:  4:17 PM  
Patient Class: Inpatient Service: Medicine Admit Source: Non-health care facility* Admit Type: EMERGENCY Admitting Provider: Mindy Wheeler Attending Provider: Glen Boss* Unit: [facility] 7 Med Surg Room/Bed: [facility]/   
Admission Diagnosis:  Acute on chronic respira* SOB Discharge Date:   Discharge Time:    
GUARANTOR INFORMATION Name:  Bal Walsh Address: [address] [place] [address] Street:  Self Phone: [phone]   [address] [place] [id number] : 1950 EMERGENCY CONTACTS Name: Mara Villanuevah, [address] [facility] [address]: 
Mobile: [phone] 
  Rel: Spouse COVERAGE INFORMATION [place]:   [place] Sub[place] Bal Walsh [place] Name[place] Pt Rel to Subscriber: Self [01] Claim Address: P.o. Guys [id number] [address] Sex: Female  
   Policy #:  [id number]   
Group #:   Group Name:   Ab Auth #: nikki Ins Phone:    
   
Secondary Insurance:   Subscriber:    
Plan Name:   Pt Rel to Subscriber:    
Claim Address: NA Sex:    
  Policy #: N/A   
Group #: N/A Group Name: N/A Auth #: N/A Ins Phone:    
Accident Date:    Accident Type:    
PROVIDER INFORMATION  
PCP:         Milo Barrow PCP Phone:  [phone] Referring Prov:   No ref. provider found Referring Phone:  N/A Advanced Directive:  Not Received Research:    
Lab Client:   Enrollment Status:

## 2020-08-20 NOTE — PROGRESS NOTES
Hospitalist Progress Note 2020 Admit Date: 2020  4:17 PM  
NAME: Tj Boswell :  1950 MRN:  [id number] Attending: Lola Wells MD 
PCP:  Lance Shone, MD 
 
SUBJECTIVE:  
 
77 yo CF with past history of bronchiectasis, JEANNE complicated by pulmonary hypertension with chronic hypoxic respiratory failure (on 4L oxygen at home), Afib (on Pradaxa) presents with worsening shortness of breath. Denies associated fevers/chills, chest pain, worsening productive cough, abdominal pain, nausea/vomiting or diarrhea. Patient was outside her PCP office with SpO2 of 60% on her home 4L. She was transported via EMS and placed on BiPAP. At bedside, patient says she has not missed any doses of her medications \"except for my potassium. \" In ED, pt weaned to [address], FiO2 70%, CXR with bibasilar airspace opacities. She was given Lasix in ED. Pt reports she is feeling better this AM but remains on AirVo 80%. Denies F/C, cough is unchanged from her usual.  
 
Review of Systems negative with exception of pertinent positives noted above PHYSICAL EXAM  
 
Patient Vitals for the past 24 hrs: 
 Temp Pulse Resp BP SpO2  
20 1150 97.9 °F (36.6 °C) 97 23 107/69 94 % 20 0852     94 % 20 0800 98 °F (36.7 °C) 88 22 136/77 95 % 20 0339 97.9 °F (36.6 °C) 91 24 136/86 96 % 20 2243     100 % 20 2145 97.3 °F (36.3 °C) 90 (!) 31 129/77 100 % 20 2059     94 % 20 1900  88 (!) 31 (!) 173/91 91 % 20 1830  86 29 155/80 100 % 20 1826     93 % 20 1721  94 (!) 38 164/83 93 % 20 1701  98 21 164/81 93 % 20 1636     98 % 20 1626  (!) 119 (!) 52 (!) 148/101 93 % 20 1625     93 % 20 1622  (!) 120  (!) 146/99 93 % 20 1620 99.2 °F (37.3 °C) (!) 103 (!) 36 (!) 146/99 93 % Oxygen Therapy O2 Sat (%): 94 % (20 1150) Pulse via Oximetry: 84 beats per minute (08/20/20 0852) O2 Device: Heated; Hi flow nasal cannula;Humidifier (08/20/20 0852) O2 Flow Rate (L/min): 50 l/min (08/20/20 0852) O2 Temperature: 87.8 °F (31 °C) (08/20/20 0852) FIO2 (%): 70 % (08/20/20 0852) Intake/Output Summary (Last 24 hours) at 8/20/2020 1155 Last data filed at 8/20/2020 6407 Gross per 24 hour Intake 460 ml Output 1825 ml Net -1365 ml General: No acute distress, obese Lungs:  Decreased BS BL bases Heart:  Regular rate and rhythm,  No murmur, rub, or gallop Abdomen: Soft, Non distended, Non tender, Positive bowel sounds Extremities: No cyanosis, clubbing or edema Neurologic:  No focal deficits Psych:  Calm, cooperative Recent Results (from the past 24 hour(s)) EKG, 12 LEAD, INITIAL Collection Time: 08/19/20  4:26 PM  
Result Value Ref Range Ventricular Rate 119 BPM  
 Atrial Rate 144 BPM  
 QRS Duration 76 ms  
 Q-T Interval 286 ms QTC Calculation (Bezet) 402 ms Calculated R Axis 46 degrees Calculated T Axis 24 degrees Diagnosis    
  !! AGE AND GENDER SPECIFIC ECG ANALYSIS !! Atrial fibrillation with rapid ventricular response Low voltage QRS Abnormal ECG When compared with ECG of 19-JUN-2020 23:06, 
Nonspecific T wave abnormality no longer evident in Anterior leads Confirmed by BERTRAND GHOSH (), Cynthia Patterson ([id number]) on 8/19/2020 5:21:46 PM 
  
CBC WITH AUTOMATED DIFF Collection Time: 08/19/20  4:29 PM  
Result Value Ref Range WBC 7.8 4.3 - 11.1 K/uL  
 RBC 3.93 (L) 4.05 - 5.2 M/uL HGB 9.4 (L) 11.7 - 15.4 g/dL HCT 33.5 (L) 35.8 - 46.3 % MCV 85.2 79.6 - 97.8 FL  
 MCH 23.9 (L) 26.1 - 32.9 PG  
 MCHC 28.1 (L) 31.4 - 35.0 g/dL  
 RDW 19.0 (H) 11.9 - 14.6 % PLATELET 445 477 - 246 K/uL MPV 9.9 9.4 - 12.3 FL ABSOLUTE NRBC 0.00 0.0 - 0.2 K/uL  
 DF AUTOMATED NEUTROPHILS 82 (H) 43 - 78 % LYMPHOCYTES 8 (L) 13 - 44 % MONOCYTES 8 4.0 - 12.0 % EOSINOPHILS 1 0.5 - 7.8 % BASOPHILS 0 0.0 - 2.0 % IMMATURE GRANULOCYTES 0 0.0 - 5.0 %  
 ABS. NEUTROPHILS 6.4 1.7 - 8.2 K/UL  
 ABS. LYMPHOCYTES 0.6 0.5 - 4.6 K/UL  
 ABS. MONOCYTES 0.6 0.1 - 1.3 K/UL  
 ABS. EOSINOPHILS 0.1 0.0 - 0.8 K/UL  
 ABS. BASOPHILS 0.0 0.0 - 0.2 K/UL  
 ABS. IMM. GRANS. 0.0 0.0 - 0.5 K/UL PROTHROMBIN TIME + INR Collection Time: 08/19/20  4:29 PM  
Result Value Ref Range Prothrombin time 17.9 (H) 12.0 - 14.7 sec INR 1.4 PTT Collection Time: 08/19/20  4:29 PM  
Result Value Ref Range aPTT 39.6 (H) 24.3 - 35.4 SEC  
LIPASE Collection Time: 08/19/20  4:29 PM  
Result Value Ref Range Lipase 163 73 - 178 U/L  
METABOLIC PANEL, COMPREHENSIVE Collection Time: 08/19/20  4:29 PM  
Result Value Ref Range Sodium 142 136 - 145 mmol/L Potassium 4.3 3.5 - 5.1 mmol/L Chloride 100 98 - 107 mmol/L  
 CO2 39 (H) 21 - 32 mmol/L Anion gap 3 (L) 7 - 16 mmol/L Glucose 135 (H) 65 - 100 mg/dL BUN 17 8 - 23 MG/DL Creatinine 1.28 (H) 0.6 - 1.0 MG/DL  
 GFR est AA 53 (L) >60 ml/min/1.73m2 GFR est non-AA 44 (L) >60 ml/min/1.73m2 Calcium 8.3 8.3 - 10.4 MG/DL Bilirubin, total 1.2 (H) 0.2 - 1.1 MG/DL  
 ALT (SGPT) 17 12 - 65 U/L  
 AST (SGOT) 21 15 - 37 U/L Alk. phosphatase 79 50 - 136 U/L Protein, total 6.3 6.3 - 8.2 g/dL Albumin 2.9 (L) 3.2 - 4.6 g/dL Globulin 3.4 2.3 - 3.5 g/dL A-G Ratio 0.9 (L) 1.2 - 3.5 MAGNESIUM Collection Time: 08/19/20  4:29 PM  
Result Value Ref Range Magnesium 2.1 1.8 - 2.4 mg/dL CULTURE, BLOOD Collection Time: 08/19/20  4:29 PM  
 Specimen: Blood Result Value Ref Range Special Requests: LEFT 
ARM 
    
 GRAM STAIN GRAM POSITIVE RODS    
 GRAM STAIN AEROBIC BOTTLE POSITIVE    
 GRAM STAIN     
  RESULTS VERIFIED, PHONED TO AND READ BACK BY [facility] , [address] 8/20/20 [place] Culture result: CULTURE IN PROGRESS,FURTHER UPDATES TO FOLLOW    
NT-PRO BNP Collection Time: 08/19/20  4:29 PM  
Result Value Ref Range NT pro-BNP 5,211 (H) 5 - 125 PG/ML  
PROCALCITONIN Collection Time: 08/19/20  4:29 PM  
Result Value Ref Range Procalcitonin 0.06 ng/mL LACTIC ACID Collection Time: 08/19/20  4:32 PM  
Result Value Ref Range Lactic acid 2.3 (HH) 0.4 - 2.0 MMOL/L  
CULTURE, BLOOD Collection Time: 08/19/20  4:40 PM  
 Specimen: Blood Result Value Ref Range Special Requests: RIGHT Antecubital 
    
 Culture result: NO GROWTH AFTER 16 HOURS    
POC VENOUS BLOOD GAS Collection Time: 08/19/20  5:30 PM  
Result Value Ref Range Device: CPAP    
 FIO2 (POC) 60 %  
 pH, venous (POC) 7.36 7.32 - 7.42    
 pCO2, venous (POC) 74.9 (H) 41 - 51 MMHG  
 pO2, venous (POC) 21 (LL) mmHg HCO3, venous (POC) 42.0 (H) 23 - 28 MMOL/L  
 sO2, venous (POC) 29 (L) 65 - 88 % Base excess, venous (POC) 14 mmol/L  
 PEEP/CPAP (POC) 10 cmH2O Allens test (POC) YES Site OTHER Specimen type (POC) VENOUS BLOOD Performed by Araceli   
 CO2, POC 44 MMOL/L Critical value read back 00:01 COLLECT TIME 1,710 LACTIC ACID Collection Time: 08/19/20  7:21 PM  
Result Value Ref Range Lactic acid 1.5 0.4 - 2.0 MMOL/L  
METABOLIC PANEL, BASIC Collection Time: 08/20/20  6:37 AM  
Result Value Ref Range Sodium 143 136 - 145 mmol/L Potassium 4.1 3.5 - 5.1 mmol/L Chloride 100 98 - 107 mmol/L  
 CO2 35 (H) 21 - 32 mmol/L Anion gap 8 7 - 16 mmol/L Glucose 81 65 - 100 mg/dL BUN 17 8 - 23 MG/DL Creatinine 1.08 (H) 0.6 - 1.0 MG/DL  
 GFR est AA >60 >60 ml/min/1.73m2 GFR est non-AA 54 (L) >60 ml/min/1.73m2 Calcium 8.3 8.3 - 10.4 MG/DL MAGNESIUM Collection Time: 08/20/20  6:37 AM  
Result Value Ref Range Magnesium 2.1 1.8 - 2.4 mg/dL CBC WITH AUTOMATED DIFF Collection Time: 08/20/20  6:37 AM  
Result Value Ref Range WBC 7.5 4.3 - 11.1 K/uL  
 RBC 3.81 (L) 4.05 - 5.2 M/uL HGB 9.2 (L) 11.7 - 15.4 g/dL HCT 31.7 (L) 35.8 - 46.3 %  MCV 83.2 79.6 - 97.8 FL  
 MCH 24.1 (L) 26.1 - 32.9 PG  
 MCHC 29.0 (L) 31.4 - 35.0 g/dL  
 RDW 19.4 (H) 11.9 - 14.6 % PLATELET 770 156 - 426 K/uL MPV 10.5 9.4 - 12.3 FL ABSOLUTE NRBC 0.00 0.0 - 0.2 K/uL  
 DF AUTOMATED NEUTROPHILS 75 43 - 78 % LYMPHOCYTES 12 (L) 13 - 44 % MONOCYTES 10 4.0 - 12.0 % EOSINOPHILS 2 0.5 - 7.8 % BASOPHILS 0 0.0 - 2.0 % IMMATURE GRANULOCYTES 0 0.0 - 5.0 %  
 ABS. NEUTROPHILS 5.6 1.7 - 8.2 K/UL  
 ABS. LYMPHOCYTES 0.9 0.5 - 4.6 K/UL  
 ABS. MONOCYTES 0.8 0.1 - 1.3 K/UL  
 ABS. EOSINOPHILS 0.2 0.0 - 0.8 K/UL  
 ABS. BASOPHILS 0.0 0.0 - 0.2 K/UL  
 ABS. IMM. GRANS. 0.0 0.0 - 0.5 K/UL Micro: All Micro Results Procedure Component Value Units Date/Time CULTURE, BLOOD [[id number]] Collected:  08/19/20 1629 Order Status:  Completed Specimen:  Blood Updated:  08/20/20 1148 Special Requests: --     
  LEFT 
ARM 
  
  GRAM STAIN GRAM POSITIVE RODS AEROBIC BOTTLE POSITIVE RESULTS VERIFIED, PHONED TO AND READ BACK BY [id number] Scot[address] Newton RN @0874 8/20/20 [place] Culture result:    
  CULTURE IN PROGRESS,FURTHER UPDATES TO FOLLOW CULTURE, BLOOD [[id number]] Collected:  08/19/20 1640 Order Status:  Completed Specimen:  Blood Updated:  08/20/20 1134 Special Requests: --     
  RIGHT Antecubital 
  
  Culture result: NO GROWTH AFTER 16 HOURS Other studies last 24 hours: 
Xr Chest [facility] Result Date: 8/19/2020 IMPRESSION: 1. Stable cardiomegaly. 2. Low lung volumes with bronchovascular crowding and bibasilar airspace opacities that could represent atelectasis versus pneumonia in the correct setting. All imaging, labs reviewed. ASSESSMENT Hospital Problems as of 8/20/2020 Date Reviewed: 8/6/2020 Codes Class Noted - Resolved POA * (Principal) Acute on chronic respiratory failure with hypoxia [facility]) ICD-10-CM: C31.91 
ICD-9-CM: 518.84, 799.02  8/19/2020 - Present Unknown Obesity hypoventilation syndrome (HCC) ICD-10-CM: K29.7 ICD-9-CM: 278.03  6/23/2020 - Present Yes Atrial fibrillation ([facility] [address] ICD-10-CM: I48.91 
ICD-9-CM: 427.31  6/20/2020 - Present Yes Bronchiectasis ([facility] [address] ICD-10-CM: J47.9 ICD-9-CM: 494.0  5/5/2020 - Present Yes Hypertension (Chronic) ICD-10-CM: I10 
ICD-9-CM: 401.9  5/5/2020 - Present Yes Acute on chronic respiratory failure with hypoxia, on 4L oxygen at home CXR with bibasilar infiltrates, no leukocytosis, afebrile, pro-BNP 5211. Not overtly volume overloaded on exam. Still on AirVo 80%. - ECHO 6/20 with preserved EF 
- continue Lasix for now - PCT 0.06 
- repeat CXR ordered - pulm and cards consulted at 's request - follows with both as outpt 
- on Pradaxa so lower suspicion for PE 
- CPAP at nighttime 
- wean O2 as tolerated 
  
GPR in blood culture Suspect contaminate. 
- follow blood cxs 
- hold on abx for now unless HD compromise or fever Afib 
- currently rate controlled 
- continue diltiazem, metoprolol, Pradaxa 
  
RAMYA Improved. - follow 
  
HTN 
- continue with home meds 
  
Proph - Pradaxa Dispo - too early to tell 
  
 
 
Signed By: Kassy Siegel MD   
 August 20, 2020

## 2020-08-20 NOTE — CONSULTS
[address] Cardiology Consultation Date of  Admission: 8/19/2020  4:17 PM  
 
Primary Care Physician: Dr Trey Jon MD 
Primary Cardiologist: Dr Sinan Cha MD 
Referring Physician: Dr Dickson Osei MD 
Consulting Physician: Dr Sinan Cha MD 
 
CC/Reason for consult: ? CHF 
 
HPI:  Ravi Orourke is a 78 y.o. female with PMH of persistent atrial fibrillation (on Pradaxa), HTN, bronchiectasis, chronic respiratory failure on home 4L O2, pulmonary HTN (RVSP 75-80 mmHg on 6/20 ECHO), and morbid obesity who presented to [facility] ED on 8/19 for hypoxia. Patient developed worsening SOB while out getting her hair cut therefore family member drove patient to PCP office. Before getting into PCP office patient oxygen level was noted to be 65% therefore EMS was called. Patient placed on CPAP via EMS and transferred to ED. Upon evaluation in ED CXR showed low lung volumes with bronchovascular crowding and bibasilar airspace opacities that could represent atelectasis versus pneumonia, /99, H/H 9.2/31.7, BUN/Cr 17/1.07, proBNP 5211, and EKG showed atrial fibrillation with rate 119. We were consulted to evaluate for possible CHF. Patient states she has been retaining fluid. Has had increased swelling in her abdomen and lower extremities. Patient reports doubling Lasix on August 6th without much relief. Past Medical History:  
Diagnosis Date  Bronchiectasis ([address]  Congestive heart failure (CHF) ([address]  HTN (hypertension)  Inguinal hernia  Pneumonia Past Surgical History:  
Procedure Laterality Date  HX BACK SURGERY    
 HX HYSTERECTOMY Allergies Allergen Reactions  Pseudoephedrine Other (comments) \"gave me high blood pressure\" Social History Socioeconomic History  Marital status:  Spouse name: Not on file  Number of children: Not on file  Years of education: Not on file  Highest education level: Not on file Occupational History  Not on file Social Needs  Financial resource strain: Not on file  Food insecurity Worry: Not on file Inability: Not on file  Transportation needs Medical: Not on file Non-medical: Not on file Tobacco Use  Smoking status: Never Smoker  Smokeless tobacco: Never Used  Tobacco comment: Has been second hand smoke all her life Substance and Sexual Activity  Alcohol use: Never Frequency: Never  Drug use: Never  Sexual activity: Not on file Lifestyle  Physical activity Days per week: Not on file Minutes per session: Not on file  Stress: Not on file Relationships  Social connections Talks on phone: Not on file Gets together: Not on file Attends Confucianism service: Not on file Active member of club or organization: Not on file Attends meetings of clubs or organizations: Not on file Relationship status: Not on file  Intimate partner violence Fear of current or ex partner: Not on file Emotionally abused: Not on file Physically abused: Not on file Forced sexual activity: Not on file Other Topics Concern  Not on file Social History Narrative  Not on file Family History Problem Relation Age of Onset  Diabetes Sister  Diabetes Brother Current Facility-Administered Medications Medication Dose Route Frequency  sodium chloride (NS) flush 5-40 mL  5-40 mL IntraVENous Q8H  
 sodium chloride (NS) flush 5-40 mL  5-40 mL IntraVENous PRN  
 acetaminophen (TYLENOL) tablet 650 mg  650 mg Oral Q6H PRN Or  
 acetaminophen (TYLENOL) suppository 650 mg  650 mg Rectal Q6H PRN  polyethylene glycol (MIRALAX) packet 17 g  17 g Oral DAILY PRN  promethazine (PHENERGAN) tablet 12.5 mg  12.5 mg Oral Q6H PRN  Or  
 ondansetron (ZOFRAN) injection 4 mg  4 mg IntraVENous Q6H PRN  
 acetaminophen (TYLENOL) tablet 650 mg  650 mg Oral Q6H PRN  
 albuterol (PROVENTIL VENTOLIN) nebulizer solution 2.5 mg  2.5 mg Nebulization Q4H PRN  
 aspirin chewable tablet 81 mg  81 mg Oral DAILY  dabigatran etexilate (PRADAXA) capsule 150 mg  150 mg Oral Q12H  
 dilTIAZem ER (CARDIZEM CD) capsule 120 mg  120 mg Oral DAILY  furosemide (LASIX) injection 40 mg  40 mg IntraVENous BID  losartan (COZAAR) tablet 50 mg  50 mg Oral DAILY  metoprolol tartrate (LOPRESSOR) tablet 100 mg  100 mg Oral Q12H  potassium chloride (K-DUR, KLOR-CON) SR tablet 40 mEq  40 mEq Oral BID  nystatin (MYCOSTATIN) 100,000 unit/gram powder   Topical BID Review of Symptoms: 
General: Positive for fatigue. No weight changes, weakness, fever or chills Skin: no rashes, lumps, or other skin changes HEENT: no headache, dizziness, lightheadedness, vision changes, hearing changes, tinnitus, vertigo, sinus pressure/pain, bleeding gums, sore throat, or hoarseness Neck: no swollen glands, goiter, pain or stiffness Respiratory: Positive for dyspnea. No cough, sputum, hemoptysis, wheezing Cardiovascular: + as per HPI Gastrointestinal: no GERD, constipation, diarrhea, liver problems, or h/o GI bleed Urinary: no frequency, urgency , hematuria, burning/pain with urination, recent flank pain, polyuria, nocturia, or difficulty urinating Peripheral Vascular: Positive for bilateral lower extremity edema. No claudication, leg cramps, prior DVTs,  color change, or swelling with redness or tenderness Musculoskeletal: no muscle or joint pain/stiffness, joint swelling, erythema of joints, or back pain Psychiatric: no depression or excessive stress Neurological: no sensory or motor loss, seizures, syncope, tremors, numbness, no dementia Hematologic: no anemia, easy bruising or bleeding Endocrine: No thyroid problems, heat or cold intolerance, excessive sweating, polyuria, polydipsia, diabetes. Subjective:  
 
Physical Exam: 
 
General: Well Developed, Well Nourished, No Acute Distress HEENT: pupils equal and round, no abnormalities noted Neck: supple, no JVD, no carotid bruits Heart: IRR without murmurs or gallops Lungs: Diminished Abd: soft, nontender, nondistended, with good bowel sounds Ext: warm, positive for edema, stasis changes Skin: warm and dry Psychiatric: Normal mood and affect Neurologic: Alert and oriented X 3 Cardiographics ECG: atrial fibrillation, rate 119 Echocardiogram:  
6/20/2020 LEFT VENTRICLE: Size was normal. Systolic function was normal. Ejection 
fraction was estimated in the range of 50 % to 55 %. There were no regional 
wall motion abnormalities. There was mild concentric hypertrophy. The study  
was 
not technically sufficient to allow evaluation of LV diastolic function. 
  
RIGHT VENTRICLE: The ventricle was dilated. Systolic function was normal. 
Estimated peak pressure was in the range of 75-80 mmHg. 
  
LEFT ATRIUM: The atrium was moderately dilated. 
  
RIGHT ATRIUM: The atrium was moderately to markedly dilated. 
  
SYSTEMIC VEINS: IVC: The inferior vena cava was dilated. The respirophasic 
change in diameter was less than 50%. 
  
AORTIC VALVE: The valve was structurally normal, tri-commissural. There was  
no 
evidence for stenosis. There was no insufficiency. 
  
MITRAL VALVE: Valve structure was normal. There was no evidence for stenosis. There was mild regurgitation. 
  
TRICUSPID VALVE: The valve structure was normal. There was no evidence for 
stenosis. There was moderate regurgitation. 
  
PULMONIC VALVE: The valve structure was normal. There was no evidence for 
stenosis. There was mild regurgitation. 
  
PERICARDIUM: There was no pericardial effusion. 
  
AORTA: The root exhibited normal size. Labs:  
Recent Results (from the past 24 hour(s)) EKG, 12 LEAD, INITIAL Collection Time: 08/19/20  4:26 PM  
Result Value Ref Range Ventricular Rate 119 BPM  
 Atrial Rate 144 BPM  
 QRS Duration 76 ms  
 Q-T Interval 286 ms QTC Calculation (Bezet) 402 ms Calculated R Axis 46 degrees Calculated T Axis 24 degrees Diagnosis    
  !! AGE AND GENDER SPECIFIC ECG ANALYSIS !! Atrial fibrillation with rapid ventricular response Low voltage QRS Abnormal ECG When compared with ECG of 19-JUN-2020 23:06, 
Nonspecific T wave abnormality no longer evident in Anterior leads Confirmed by BERTRAND GHOSH (), Danielle Walker ([id number]) on 8/19/2020 5:21:46 PM 
  
CBC WITH AUTOMATED DIFF Collection Time: 08/19/20  4:29 PM  
Result Value Ref Range WBC 7.8 4.3 - 11.1 K/uL  
 RBC 3.93 (L) 4.05 - 5.2 M/uL HGB 9.4 (L) 11.7 - 15.4 g/dL HCT 33.5 (L) 35.8 - 46.3 % MCV 85.2 79.6 - 97.8 FL  
 MCH 23.9 (L) 26.1 - 32.9 PG  
 MCHC 28.1 (L) 31.4 - 35.0 g/dL  
 RDW 19.0 (H) 11.9 - 14.6 % PLATELET 590 270 - 502 K/uL MPV 9.9 9.4 - 12.3 FL ABSOLUTE NRBC 0.00 0.0 - 0.2 K/uL  
 DF AUTOMATED NEUTROPHILS 82 (H) 43 - 78 % LYMPHOCYTES 8 (L) 13 - 44 % MONOCYTES 8 4.0 - 12.0 % EOSINOPHILS 1 0.5 - 7.8 % BASOPHILS 0 0.0 - 2.0 % IMMATURE GRANULOCYTES 0 0.0 - 5.0 %  
 ABS. NEUTROPHILS 6.4 1.7 - 8.2 K/UL  
 ABS. LYMPHOCYTES 0.6 0.5 - 4.6 K/UL  
 ABS. MONOCYTES 0.6 0.1 - 1.3 K/UL  
 ABS. EOSINOPHILS 0.1 0.0 - 0.8 K/UL  
 ABS. BASOPHILS 0.0 0.0 - 0.2 K/UL  
 ABS. IMM. GRANS. 0.0 0.0 - 0.5 K/UL PROTHROMBIN TIME + INR Collection Time: 08/19/20  4:29 PM  
Result Value Ref Range Prothrombin time 17.9 (H) 12.0 - 14.7 sec INR 1.4 PTT Collection Time: 08/19/20  4:29 PM  
Result Value Ref Range aPTT 39.6 (H) 24.3 - 35.4 SEC  
LIPASE Collection Time: 08/19/20  4:29 PM  
Result Value Ref Range Lipase 163 73 - 370 U/L  
METABOLIC PANEL, COMPREHENSIVE Collection Time: 08/19/20  4:29 PM  
Result Value Ref Range Sodium 142 136 - 145 mmol/L Potassium 4.3 3.5 - 5.1 mmol/L Chloride 100 98 - 107 mmol/L  
 CO2 39 (H) 21 - 32 mmol/L Anion gap 3 (L) 7 - 16 mmol/L Glucose 135 (H) 65 - 100 mg/dL BUN 17 8 - 23 MG/DL  Creatinine 1.28 (H) 0.6 - 1.0 MG/DL  
 GFR est AA 53 (L) >60 ml/min/1.73m2 GFR est non-AA 44 (L) >60 ml/min/1.73m2 Calcium 8.3 8.3 - 10.4 MG/DL Bilirubin, total 1.2 (H) 0.2 - 1.1 MG/DL  
 ALT (SGPT) 17 12 - 65 U/L  
 AST (SGOT) 21 15 - 37 U/L Alk. phosphatase 79 50 - 136 U/L Protein, total 6.3 6.3 - 8.2 g/dL Albumin 2.9 (L) 3.2 - 4.6 g/dL Globulin 3.4 2.3 - 3.5 g/dL A-G Ratio 0.9 (L) 1.2 - 3.5 MAGNESIUM Collection Time: 08/19/20  4:29 PM  
Result Value Ref Range Magnesium 2.1 1.8 - 2.4 mg/dL CULTURE, BLOOD Collection Time: 08/19/20  4:29 PM  
 Specimen: Blood Result Value Ref Range Special Requests: LEFT 
ARM 
    
 GRAM STAIN GRAM POSITIVE RODS    
 GRAM STAIN AEROBIC BOTTLE POSITIVE    
 GRAM STAIN     
  RESULTS VERIFIED, PHONED TO AND READ BACK BY [address] [place] [address], [facility] [employer] 8/20/20 SC Culture result: CULTURE IN PROGRESS,FURTHER UPDATES TO FOLLOW    
NT-PRO BNP Collection Time: 08/19/20  4:29 PM  
Result Value Ref Range NT pro-BNP 5,211 (H) 5 - 125 PG/ML  
PROCALCITONIN Collection Time: 08/19/20  4:29 PM  
Result Value Ref Range Procalcitonin 0.06 ng/mL LACTIC ACID Collection Time: 08/19/20  4:32 PM  
Result Value Ref Range Lactic acid 2.3 (HH) 0.4 - 2.0 MMOL/L  
CULTURE, BLOOD Collection Time: 08/19/20  4:40 PM  
 Specimen: Blood Result Value Ref Range Special Requests: RIGHT Antecubital 
    
 Culture result: NO GROWTH AFTER 16 HOURS    
POC VENOUS BLOOD GAS Collection Time: 08/19/20  5:30 PM  
Result Value Ref Range Device: CPAP    
 FIO2 (POC) 60 %  
 pH, venous (POC) 7.36 7.32 - 7.42    
 pCO2, venous (POC) 74.9 (H) 41 - 51 MMHG  
 pO2, venous (POC) 21 (LL) mmHg HCO3, venous (POC) 42.0 (H) 23 - 28 MMOL/L  
 sO2, venous (POC) 29 (L) 65 - 88 % Base excess, venous (POC) 14 mmol/L  
 PEEP/CPAP (POC) 10 cmH2O Allens test (POC) YES Site OTHER Specimen type (POC) VENOUS BLOOD  Performed by Araceli   
 CO2, POC 44 MMOL/L  
 Critical value read back 00:01 COLLECT TIME 1,710 LACTIC ACID Collection Time: 08/19/20  7:21 PM  
Result Value Ref Range Lactic acid 1.5 0.4 - 2.0 MMOL/L  
METABOLIC PANEL, BASIC Collection Time: 08/20/20  6:37 AM  
Result Value Ref Range Sodium 143 136 - 145 mmol/L Potassium 4.1 3.5 - 5.1 mmol/L Chloride 100 98 - 107 mmol/L  
 CO2 35 (H) 21 - 32 mmol/L Anion gap 8 7 - 16 mmol/L Glucose 81 65 - 100 mg/dL BUN 17 8 - 23 MG/DL Creatinine 1.08 (H) 0.6 - 1.0 MG/DL  
 GFR est AA >60 >60 ml/min/1.73m2 GFR est non-AA 54 (L) >60 ml/min/1.73m2 Calcium 8.3 8.3 - 10.4 MG/DL MAGNESIUM Collection Time: 08/20/20  6:37 AM  
Result Value Ref Range Magnesium 2.1 1.8 - 2.4 mg/dL CBC WITH AUTOMATED DIFF Collection Time: 08/20/20  6:37 AM  
Result Value Ref Range WBC 7.5 4.3 - 11.1 K/uL  
 RBC 3.81 (L) 4.05 - 5.2 M/uL HGB 9.2 (L) 11.7 - 15.4 g/dL HCT 31.7 (L) 35.8 - 46.3 % MCV 83.2 79.6 - 97.8 FL  
 MCH 24.1 (L) 26.1 - 32.9 PG  
 MCHC 29.0 (L) 31.4 - 35.0 g/dL  
 RDW 19.4 (H) 11.9 - 14.6 % PLATELET 674 874 - 112 K/uL MPV 10.5 9.4 - 12.3 FL ABSOLUTE NRBC 0.00 0.0 - 0.2 K/uL  
 DF AUTOMATED NEUTROPHILS 75 43 - 78 % LYMPHOCYTES 12 (L) 13 - 44 % MONOCYTES 10 4.0 - 12.0 % EOSINOPHILS 2 0.5 - 7.8 % BASOPHILS 0 0.0 - 2.0 % IMMATURE GRANULOCYTES 0 0.0 - 5.0 %  
 ABS. NEUTROPHILS 5.6 1.7 - 8.2 K/UL  
 ABS. LYMPHOCYTES 0.9 0.5 - 4.6 K/UL  
 ABS. MONOCYTES 0.8 0.1 - 1.3 K/UL  
 ABS. EOSINOPHILS 0.2 0.0 - 0.8 K/UL  
 ABS. BASOPHILS 0.0 0.0 - 0.2 K/UL  
 ABS. IMM. GRANS. 0.0 0.0 - 0.5 K/UL Pt has been seen and examined by Dr. Timothy Cooper. He agrees with the following assessment and plan. Assessment/Plan:  
  
Principal Problem: 
  Acute on chronic respiratory failure with hypoxia- per primary Active Problems: 
  Bronchiectasis- per primary Hypertension-  cont Cozaar   Atrial fibrillation with RVR- currently rate controlled, cont Cardizem CD, metoprolol, and Pradaxa Obesity hypoventilation syndrome Volume overload-multifactorial with likely component of venous insufficiency/hypoalbuminemia/pulmonary HTN-  cont IV diuresis, strict I&O's, daily weights Thank you for consulting [facility] Cardiology and allowing us to participate in the care of this patient. We will continue to follow along with you.  
 
 
Lindsay Wagner PA-C

## 2020-08-20 NOTE — H&P
HOSPITALIST H&P/CONSULT 
NAME:  Milly Cosme Age:  78 y.o. 
:   1950 MRN:   [id number] PCP: Jorge Hamilton MD 
Consulting MD: Treatment Team: Attending Provider: Senthil Fraga DO 
HPI:  
77 yo CF with past history of bronchiectasis, JEANNE complicated by pulmonary hypertension with chronic hypoxic respiratory failure (on 4L oxygen at home), Afib (on Pradaxa) presents with worsening shortness of breath that started when she went out to get a haircut today. She denies associated fevers/chills, chest pain, worsening productive cough, abdominal pain, nausea/vomiting or diarrhea. Patient was outside her PCP office with SpO2 of 60% on her home 4L. She was transported via EMS and placed on BiPAP. At bedside, patient says she has not missed any doses of her medications \"except for my potassium. \"  
 
ED Course: patient weaned from CPAP to Airvo 60L and FiO2 of 70%. LA of 2.3 with SCr of 1.3 (baseline around 0.9). Blood cultures collected x2. EKG showing Afib with RVR with HR of 119 bpm. Given albuterol nebulizer and Lasix 40 mg IV once. Hospitalist called for admission. Complete ROS done and is as stated in HPI or otherwise negative Past Medical History:  
Diagnosis Date  [address] [address]  Congestive heart failure (CHF) ([facility] [address]  HTN (hypertension)  Inguinal hernia  Pneumonia Past Surgical History:  
Procedure Laterality Date  HX BACK SURGERY    
 HX HYSTERECTOMY Prior to Admission Medications Prescriptions Last Dose Informant Patient Reported? Taking? Oxygen   Yes No  
SiLPM  
acetaminophen (TYLENOL) 325 mg tablet   No No  
Sig: Take 2 Tabs by mouth every six (6) hours as needed for Pain. albuterol (PROVENTIL HFA, VENTOLIN HFA, PROAIR HFA) 90 mcg/actuation inhaler   No No  
Sig: Take 1 Puff by inhalation every four (4) hours as needed for Wheezing or Shortness of Breath.   
aspirin 81 mg chewable tablet   Yes No  
 Sig: Take 81 mg by mouth daily. dabigatran etexilate (Pradaxa) 150 mg capsule   No No  
Sig: Take 1 Cap by mouth every twelve (12) hours. dilTIAZem ER (CARDIZEM CD) 120 mg capsule   No No  
Sig: Take 1 Cap by mouth daily. ergocalciferol (Vitamin D2) 1,250 mcg (50,000 unit) capsule   Yes No  
Sig: Take 50,000 Units by mouth every seven (7) days. Taken on , per patient  
furosemide (LASIX) 40 mg tablet   Yes No  
Sig: Take  by mouth daily. loratadine (Claritin) 10 mg tablet   Yes No  
Sig: Take 10 mg by mouth daily as needed for Allergies. losartan (COZAAR) 50 mg tablet   No No  
Sig: Take 1 Tab by mouth daily. Indications: high blood pressure  
metoprolol tartrate (LOPRESSOR) 100 mg IR tablet   No No  
Sig: Take 1 Tab by mouth every twelve (12) hours. potassium chloride (K-DUR, KLOR-CON) 20 mEq tablet   Yes No  
Sig: Take  by mouth two (2) times a day. Facility-Administered Medications: None Allergies Allergen Reactions  Pseudoephedrine Other (comments) \"gave me high blood pressure\" Social History Tobacco Use  Smoking status: Never Smoker  Smokeless tobacco: Never Used  Tobacco comment: Has been second hand smoke all her life Substance Use Topics  Alcohol use: Never Frequency: Never Family History Problem Relation Age of Onset  Diabetes Sister  Diabetes Brother Objective:  
 
Visit Vitals /77 (BP 1 Location: Right arm, BP Patient Position: At rest) Pulse 90 Temp 97.3 °F (36.3 °C) Resp (!) 31 Ht 5' 2\" (1.575 m) Wt 134.7 kg (297 lb) SpO2 100% BMI 54.32 kg/m² Temp (24hrs), Av.3 °F (36.8 °C), Min:97.3 °F (36.3 °C), Max:99.2 °F (37.3 °C) Oxygen Therapy O2 Sat (%): 100 % (20) Pulse via Oximetry: 95 beats per minute (20) O2 Device: Non-rebreather mask(placed for use while transporting to floor) (20) O2 Flow Rate (L/min): 15 l/min (20) FIO2 (%): 70 % (08/19/20 1826) Physical Exam: 
General:    Alert, cooperative, no distress, appears stated age. Head:   Normocephalic, without obvious abnormality, atraumatic. Nose:  Nares normal. No drainage or sinus tenderness. Lungs:   Coarse breath sounds, no active wheezing, on Airvo, not visibly tachypneic or dyspneic Heart:   Regular rate and rhythm,  no murmur, rub or gallop. Abdomen:   Soft, non-tender. Not distended. Bowel sounds normal.  
Extremities: No cyanosis. No edema. No clubbing Skin:     Texture, turgor normal. No rashes or lesions. Not Jaundiced Neurologic: Alert and oriented x 3, no focal deficits Data Review:  
Recent Results (from the past 24 hour(s)) EKG, 12 LEAD, INITIAL Collection Time: 08/19/20  4:26 PM  
Result Value Ref Range Ventricular Rate 119 BPM  
 Atrial Rate 144 BPM  
 QRS Duration 76 ms  
 Q-T Interval 286 ms QTC Calculation (Bezet) 402 ms Calculated R Axis 46 degrees Calculated T Axis 24 degrees Diagnosis    
  !! AGE AND GENDER SPECIFIC ECG ANALYSIS !! Atrial fibrillation with rapid ventricular response Low voltage QRS Abnormal ECG When compared with ECG of 19-JUN-2020 23:06, 
Nonspecific T wave abnormality no longer evident in Anterior leads Confirmed by BERTRAND GHOSH (), Cynthia Patterson ([id number]) on 8/19/2020 5:21:46 PM 
  
CBC WITH AUTOMATED DIFF Collection Time: 08/19/20  4:29 PM  
Result Value Ref Range WBC 7.8 4.3 - 11.1 K/uL  
 RBC 3.93 (L) 4.05 - 5.2 M/uL HGB 9.4 (L) 11.7 - 15.4 g/dL HCT 33.5 (L) 35.8 - 46.3 % MCV 85.2 79.6 - 97.8 FL  
 MCH 23.9 (L) 26.1 - 32.9 PG  
 MCHC 28.1 (L) 31.4 - 35.0 g/dL  
 RDW 19.0 (H) 11.9 - 14.6 % PLATELET 092 547 - 260 K/uL MPV 9.9 9.4 - 12.3 FL ABSOLUTE NRBC 0.00 0.0 - 0.2 K/uL  
 DF AUTOMATED NEUTROPHILS 82 (H) 43 - 78 % LYMPHOCYTES 8 (L) 13 - 44 % MONOCYTES 8 4.0 - 12.0 % EOSINOPHILS 1 0.5 - 7.8 % BASOPHILS 0 0.0 - 2.0 % IMMATURE GRANULOCYTES 0 0.0 - 5.0 % ABS. NEUTROPHILS 6.4 1.7 - 8.2 K/UL  
 ABS. LYMPHOCYTES 0.6 0.5 - 4.6 K/UL  
 ABS. MONOCYTES 0.6 0.1 - 1.3 K/UL  
 ABS. EOSINOPHILS 0.1 0.0 - 0.8 K/UL  
 ABS. BASOPHILS 0.0 0.0 - 0.2 K/UL  
 ABS. IMM. GRANS. 0.0 0.0 - 0.5 K/UL PROTHROMBIN TIME + INR Collection Time: 08/19/20  4:29 PM  
Result Value Ref Range Prothrombin time 17.9 (H) 12.0 - 14.7 sec INR 1.4 PTT Collection Time: 08/19/20  4:29 PM  
Result Value Ref Range aPTT 39.6 (H) 24.3 - 35.4 SEC  
LIPASE Collection Time: 08/19/20  4:29 PM  
Result Value Ref Range Lipase 163 73 - 466 U/L  
METABOLIC PANEL, COMPREHENSIVE Collection Time: 08/19/20  4:29 PM  
Result Value Ref Range Sodium 142 136 - 145 mmol/L Potassium 4.3 3.5 - 5.1 mmol/L Chloride 100 98 - 107 mmol/L  
 CO2 39 (H) 21 - 32 mmol/L Anion gap 3 (L) 7 - 16 mmol/L Glucose 135 (H) 65 - 100 mg/dL BUN 17 8 - 23 MG/DL Creatinine 1.28 (H) 0.6 - 1.0 MG/DL  
 GFR est AA 53 (L) >60 ml/min/1.73m2 GFR est non-AA 44 (L) >60 ml/min/1.73m2 Calcium 8.3 8.3 - 10.4 MG/DL Bilirubin, total 1.2 (H) 0.2 - 1.1 MG/DL  
 ALT (SGPT) 17 12 - 65 U/L  
 AST (SGOT) 21 15 - 37 U/L Alk. phosphatase 79 50 - 136 U/L Protein, total 6.3 6.3 - 8.2 g/dL Albumin 2.9 (L) 3.2 - 4.6 g/dL Globulin 3.4 2.3 - 3.5 g/dL A-G Ratio 0.9 (L) 1.2 - 3.5 MAGNESIUM Collection Time: 08/19/20  4:29 PM  
Result Value Ref Range Magnesium 2.1 1.8 - 2.4 mg/dL NT-PRO BNP Collection Time: 08/19/20  4:29 PM  
Result Value Ref Range NT pro-BNP 5,211 (H) 5 - 125 PG/ML  
LACTIC ACID Collection Time: 08/19/20  4:32 PM  
Result Value Ref Range Lactic acid 2.3 (HH) 0.4 - 2.0 MMOL/L  
POC VENOUS BLOOD GAS Collection Time: 08/19/20  5:30 PM  
Result Value Ref Range Device: CPAP    
 FIO2 (POC) 60 %  
 pH, venous (POC) 7.36 7.32 - 7.42    
 pCO2, venous (POC) 74.9 (H) 41 - 51 MMHG  
 pO2, venous (POC) 21 (LL) mmHg  HCO3, venous (POC) 42.0 (H) 23 - 28 MMOL/L  
 sO2, venous (POC) 29 (L) 65 - 88 % Base excess, venous (POC) 14 mmol/L  
 PEEP/CPAP (POC) 10 cmH2O Allens test (POC) YES Site OTHER Specimen type (POC) VENOUS BLOOD Performed by Araceli   
 CO2, POC 44 MMOL/L Critical value read back 00:01 COLLECT TIME 1,710 LACTIC ACID Collection Time: 08/19/20  7:21 PM  
Result Value Ref Range Lactic acid 1.5 0.4 - 2.0 MMOL/L Imaging Keyanna Lipoma Arthur Shearing Assessment and Plan: Active Hospital Problems Diagnosis Date Noted  Acute on chronic respiratory failure with hypoxia ([facility] [address] 08/19/2020  Obesity hypoventilation syndrome ([facility] [address] 06/23/2020  Atrial fibrillation ([facility] [address] 06/20/2020  Bronchiectasis ([facility] [address] 05/05/2020  Hypertension 05/05/2020 PLAN # Acute on chronic respiratory failure with hypoxia, on 4L oxygen at home 
- weaned off CPAP to Airvo 
- given Lasix 40 mg IV in ED 
- check procalcitonin 
- will continue with Lasix 40 mg IV BID 
- patient needs to be on CPAP at nighttime 
- if compliant with home CPAP, may need pulmonary to assess her home settings to see if adjustments need to be made 
- wean supplemental oxygen as tolerated # Afib, did have episode of RVR likely due to above - currently rate controlled 
- continue with home diltiazem and metoprolol 
- continue with Pradaxa # ? RAMYA 
- rise in SCr from 0.9 to 1.3 
- will trend with BMPs 
- strict I's/O's 
- check post-void bladder scan # HTN 
- continue with home meds F/E/N: no fluids, replete electrolytes as needed, cardiac diet Ppx: Pradaxa for VTE Code Status: FULL CODE Disposition: Admit to Inpatient with plan as above. PT/OT consults. Discussed with patient at bedside. All questions answered. Signed By: Hayley Mon DO August 19, 2020

## 2020-08-21 NOTE — PROGRESS NOTES
Late entry progress note for 's visit on 8.20.20: Initial visit by  to convey care and concern and to explore spiritual needs. Staff was at bedside providing care. Chaplains remain available for follow-up care. Gwen Savage MDiv Board Certified [address] [place] [address]

## 2020-08-21 NOTE — PROGRESS NOTES
8/21/2020 2:38 PM 
 
Admit Date: 8/19/2020 Admit Diagnosis: Acute on chronic respiratory failure with hypoxia ([address] [J96.21] Subjective:  
Breathing better- no cp Objective:  
  
Visit Vitals /83 (BP 1 Location: Right arm, BP Patient Position: At rest) Pulse 90 Temp 98.4 °F (36.9 °C) Resp 19 Ht 5' 2\" (1.575 m) Wt 292 lb 9.6 oz (132.7 kg) SpO2 98% BMI 53.52 kg/m² Physical Exam: 
Fleeta Latin, Well Nourished, No Acute Distress, Alert & Oriented x 3, appropriate mood. Neck- supple, no JVD 
CV- regular rate and rhythm no MRG Lung- clear bilaterally Abd- soft, nontender, nondistended Ext- no edema bilaterally. Skin- warm and dry Data Review:  
Recent Labs  
  08/21/20 
0260  08/19/20 
1629    < > 142 K 3.9   < > 4.3 BUN 15   < > 17 CREA 0.91   < > 1.28* WBC 6.7   < > 7.8 HGB 8.6*   < > 9.4* HCT 29.8*   < > 33.5*  
   < > 232 INR  --   --  1.4  
 < > = values in this interval not displayed. Assessment/Plan:  
 
Principal Problem: 
  Acute on chronic respiratory failure with hypoxia ([address] (8/19/2020) Active Problems: 
  Bronchiectasis ([address] (5/5/2020) Hypertension (5/5/2020)Stable. Continue current medical therapy. Atrial fibrillation ([address] (6/20/2020)Stable. Continue current medical therapy. Suspected sleep apnea (6/20/2020) Obesity hypoventilation syndrome ([address] (6/23/2020) Moderate to severe pulmonary hypertension ([address] (8/20/2020) Acute diastolic CHF (congestive heart failure) ([address] (8/20/2020)Improved with current therapy. Will continue medications Ordered jaxon ramirez

## 2020-08-21 NOTE — PROGRESS NOTES
Problem: Pressure Injury - Risk of 
Goal: *Prevention of pressure injury Description: Document Greg Scale and appropriate interventions in the flowsheet. Outcome: Progressing Towards Goal 
Note: Pressure Injury Interventions: 
Sensory Interventions: Assess changes in LOC, Avoid rigorous massage over bony prominences Moisture Interventions: Check for incontinence Q2 hours and as needed Activity Interventions: Increase time out of bed, Pressure redistribution bed/mattress(bed type), PT/OT evaluation Mobility Interventions: HOB 30 degrees or less, Pressure redistribution bed/mattress (bed type), PT/OT evaluation Nutrition Interventions: Document food/fluid/supplement intake, Offer support with meals,snacks and hydration Friction and Shear Interventions: HOB 30 degrees or less Problem: Patient Education: Go to Patient Education Activity Goal: Patient/Family Education Outcome: Progressing Towards Goal 
  
Problem: Falls - Risk of 
Goal: *Absence of Falls Description: Document Wood Villalobos Fall Risk and appropriate interventions in the flowsheet. Outcome: Progressing Towards Goal 
Note: Fall Risk Interventions: 
Mobility Interventions: Bed/chair exit alarm, Communicate number of staff needed for ambulation/transfer, Patient to call before getting OOB Medication Interventions: Bed/chair exit alarm, Patient to call before getting OOB, Teach patient to arise slowly Elimination Interventions: Bed/chair exit alarm, Call light in reach, Patient to call for help with toileting needs, Stay With Me (per policy), Toilet paper/wipes in reach, Toileting schedule/hourly rounds Problem: Patient Education: Go to Patient Education Activity Goal: Patient/Family Education Outcome: Progressing Towards Goal 
  
Problem: General Medical Care Plan Goal: *Vital signs within specified parameters Outcome: Progressing Towards Goal 
Goal: *Labs within defined limits Outcome: Progressing Towards Goal 
Goal: *Absence of infection signs and symptoms Outcome: Progressing Towards Goal 
Goal: *Optimal pain control at patient's stated goal 
Outcome: Progressing Towards Goal 
Goal: *Skin integrity maintained Outcome: Progressing Towards Goal 
Goal: *Fluid volume balance Outcome: Progressing Towards Goal 
Goal: *Optimize nutritional status Outcome: Progressing Towards Goal 
Goal: *Anxiety reduced or absent Outcome: Progressing Towards Goal 
Goal: *Progressive mobility and function (eg: ADL's) Outcome: Progressing Towards Goal 
  
Problem: Patient Education: Go to Patient Education Activity Goal: Patient/Family Education Outcome: Progressing Towards Goal 
  
Problem: Patient Education: Go to Patient Education Activity Goal: Patient/Family Education Outcome: Progressing Towards Goal 
  
Problem: Patient Education: Go to Patient Education Activity Goal: Patient/Family Education Outcome: Progressing Towards Goal

## 2020-08-21 NOTE — PROGRESS NOTES
Problem: Patient Education: Go to Patient Education Activity Goal: Patient/Family Education Outcome: Progressing Towards Goal 
Note: 1. Patient will complete total body bathing and dressing with modified independence and adaptive equipment as needed. 2. Patient will complete toileting with modified independence and adaptive equipment as needed. 3. Patient will tolerate 20 minutes of OT treatment with self-incorporated rest breaks to increase activity tolerance for ADLs. PROGRESSING 8/21/2020 4. Patient will complete functional transfers with modified independence and adaptive equipment as needed. PROGRESSING 8/21/2020 5. Patient will complete functional mobility for ADLs with modified independence and adaptive equipment as needed. PROGRESSING 8/21/2020 6. Patient will verbalize 2 energy conservation techniques with no cues from therapist to increase safety and independence with ADLs. PROGRESSING 8/21/2020 Timeframe: 7 visits OCCUPATIONAL THERAPY: Daily Note and PM 8/21/2020 INPATIENT: OT Visit Days: 2 [place] / [place] / Product Type: Medicare /  
  
NAME/AGE/GENDER: Bautista Maradiaga is a 78 y.o. female PRIMARY DIAGNOSIS:  Acute on chronic respiratory failure with hypoxia (HCC) [J96.21] Acute on chronic respiratory failure with hypoxia (HCC) Acute on chronic respiratory failure with hypoxia (HCC) ICD-10: Treatment Diagnosis:  
 · Generalized Muscle Weakness (M62.81) Precautions/Allergies: 
  Pseudoephedrine ASSESSMENT:  
Per Initial Assessment: 
Ms. Mara Payne is a 78 y.o. female admitted with SOB, respiratory failure. Hx pulmonary hypertension, chronic hypoxic respiratory failure. Has been on 4L supplemental O2 since June. At baseline pt lives with spouse and reports independence with ADLs, though reports she has had increased difficulty with ADLs over the last year. Independent with household ambulation and driving, utilizes w/c for MD visits. 8/21/2020: Pt found seated in chair upon arrival, alert and agreeable to OT treatment. Reports she has been sitting up since prior to lunch, is ready to get to bed as her backside is getting sore. Pt practiced functional transfers with CGA, min cues for hand placement/safety. Steps to bed with RW/CGA-SBA, min cues for RW management. Could benefit from bariatric RW, current RW in room (looks like has been issued to pt from [address]) is too narrow. Stand > sit with CGA/cues for hand placement. Seated rest break, sats drop to 88% on Airvo, recover with breathing technique. Sit > supine with Elle to advance BLEs. Cues throughout for breathing technique. Pt left supine in bed with head of bed raised, call bell within reach. Pt is making progress towards goals, limited by SOB. See above. Continue OT POC. This section established at most recent assessment PROBLEM LIST (Impairments causing functional limitations): 1. Decreased Strength 2. Decreased ADL/Functional Activities 3. Decreased Transfer Abilities 4. Decreased Ambulation Ability/Technique 5. Decreased Balance 6. Decreased Activity Tolerance 7. Decreased Pacing Skills 8. Decreased Work Simplification/Energy Conservation Techniques 9. Increased Fatigue 10. Increased Shortness of Breath 11. Edema/Girth 12. Decreased [place] with Home Exercise Program 
 INTERVENTIONS PLANNED: (Benefits and precautions of occupational therapy have been discussed with the patient.) 1. Activities of daily living training 2. Adaptive equipment training 3. Balance training 4. Clothing management 5. Community reintergration 6. Donning&doffing training 7. Hygiene training 8. Neuromuscular re-eduation 9. Re-evaluation 10. Therapeutic activity 11. Therapeutic exercise TREATMENT PLAN: Frequency/Duration: Follow patient 3x/week to address above goals. Rehabilitation Potential For Stated Goals: Good REHAB RECOMMENDATIONS (at time of discharge pending progress):   
Placement: It is my opinion, based on this patient's performance to date, that Ms. Boswell may benefit from participating in 1-2 additional therapy sessions in order to continue to assess for rehab potential and then make recommendation for disposition at discharge. STR vs HHOT pending respiratory status Equipment: ? TBD, may need RW  
    
 
 
 
OCCUPATIONAL PROFILE AND HISTORY:  
History of Present Injury/Illness (Reason for Referral): 
79 yo CF with past history of bronchiectasis, JEANNE complicated by pulmonary hypertension with chronic hypoxic respiratory failure (on 4L oxygen at home), Afib (on Pradaxa) presents with worsening shortness of breath that started when she went out to get a haircut today. She denies associated fevers/chills, chest pain, worsening productive cough, abdominal pain, nausea/vomiting or diarrhea. Patient was outside her PCP office with SpO2 of 60% on her home 4L. She was transported via EMS and placed on BiPAP. At bedside, patient says she has not missed any doses of her medications \"except for my potassium. \"  
  
ED Course: patient weaned from CPAP to Airvo 60L and FiO2 of 70%. LA of 2.3 with SCr of 1.3 (baseline around 0.9). Blood cultures collected x2. EKG showing Afib with RVR with HR of 119 bpm. Given albuterol nebulizer and Lasix 40 mg IV once. Hospitalist called for admission. Past Medical History/Comorbidities: Ms. Jam Bee  has a past medical history of Bronchiectasis ([address] Congestive heart failure (CHF) ([address] HTN (hypertension), Inguinal hernia, and Pneumonia. Ms. Jam Bee  has a past surgical history that includes hx hysterectomy and hx back surgery. Social History/Living Environment:  
Home Environment: Private residence # Steps to Enter: 6 Rails to Enter: Yes Hand Rails : Bilateral 
One/Two Story Residence: One story # of Interior Steps: 25 Living Alone: No 
 Support Systems: Spouse/Significant Other/Partner Patient Expects to be Discharged to[de-identified] Private residence Current DME Used/Available at Home: Oxygen, portable Tub or Shower Type: Tub/Shower combination Prior Level of Function/Work/Activity: Hx pulmonary hypertension, chronic hypoxic respiratory failure. Has been on 4L supplemental O2 since June. At baseline pt lives with spouse and reports independence with ADLs, though reports she has had increased difficulty with ADLs over the last year. Independent with household ambulation and driving, utilizes w/c for MD visits. Personal Factors:   
      Sex:  female Age:  78 y.o. Other factors that influence how disability is experienced by the patient:  Multiple co-morbidities Number of Personal Factors/Comorbidities that affect the Plan of Care: Expanded review of therapy/medical records (1-2):  MODERATE COMPLEXITY ASSESSMENT OF OCCUPATIONAL PERFORMANCE[de-identified]  
Activities of Daily Living:  
Basic ADLs (From Assessment) Complex ADLs (From Assessment) Feeding: Independent Oral Facial Hygiene/Grooming: Setup Bathing: Moderate assistance Upper Body Dressing: Setup Lower Body Dressing: Maximum assistance Toileting: Moderate assistance Instrumental ADL Meal Preparation: Total assistance Homemaking: Total assistance Grooming/Bathing/Dressing Activities of Daily Living Bed/Mat Mobility Supine to Sit: Minimum assistance; Additional time Sit to Supine: Minimum assistance Sit to Stand: Contact guard assistance Stand to Sit: Contact guard assistance Bed to Chair: Contact guard assistance Scooting: Stand-by assistance Most Recent Physical Functioning:  
Gross Assessment: 
AROM: Generally decreased, functional(BUEs) Strength: Generally decreased, functional(BUEs) Coordination: Within functional limits(BUEs) Posture: 
  
Balance: 
Sitting - Static: Good (unsupported) Sitting - Dynamic: Good (unsupported) Standing: Impaired Standing - Static: Good;Constant support Standing - Dynamic : Fair;Constant support Bed Mobility: 
Supine to Sit: Minimum assistance; Additional time Sit to Supine: Minimum assistance Scooting: Stand-by assistance Wheelchair Mobility: 
  
Transfers: 
Sit to Stand: Contact guard assistance Stand to Sit: Contact guard assistance Bed to Chair: Contact guard assistance Patient Vitals for the past 6 hrs: 
 BP BP Patient Position SpO2 O2 Flow Rate (L/min) Pulse 20 1138 133/83 At rest 96 %  90  
20 1143   98 % 40 l/min   
20 1503   95 %   Mental Status Neurologic State: Alert Orientation Level: Oriented X4 Cognition: Follows commands Perception: Appears intact Perseveration: No perseveration noted Safety/Judgement: Awareness of environment, Fall prevention, Insight into deficits Physical Skills Involved: 1. Range of Motion 2. Balance 3. Strength 4. Activity Tolerance 5. Gross Motor Control 6. Edema Cognitive Skills Affected (resulting in the inability to perform in a timely and safe manner): 1. None Psychosocial Skills Affected: 1. Habits/Routines 2. Environmental Adaptation 3. Social Interaction 4. Emotional Regulation 5. Self-Awareness 6. Awareness of Others 7. Social Roles Number of elements that affect the Plan of Care: 5+:  HIGH COMPLEXITY CLINICAL DECISION MAKIN [facility] [address] [address] AM-PAC 6 Clicks Daily Activity Inpatient Short Form How much help from another person does the patient currently need. .. Total A Lot A Little None 1. Putting on and taking off regular lower body clothing? [] 1   [x] 2   [] 3   [] 4  
2. Bathing (including washing, rinsing, drying)? [] 1   [x] 2   [] 3   [] 4  
3.   Toileting, which includes using toilet, bedpan or urinal?   [] 1   [x] 2   [] 3   [] 4  
 4.  Putting on and taking off regular upper body clothing? [] 1   [] 2   [x] 3   [] 4  
5. Taking care of personal grooming such as brushing teeth? [] 1   [] 2   [x] 3   [] 4  
6. Eating meals? [] 1   [] 2   [] 3   [x] 4  
© 2007, Trustees of [facility] [place] [place] [employer], under license to [employer]. All rights reserved Score:  Initial: 16 8/20/2020 Most Recent: X (Date: -- ) Interpretation of Tool:  Represents activities that are increasingly more difficult (i.e. Bed mobility, Transfers, Gait). Medical Necessity:    
· Patient demonstrates good rehab potential due to higher previous functional level. Reason for Services/Other Comments: 
· Patient continues to require skilled intervention due to inability to complete ADLs at prior level of independence. Use of outcome tool(s) and clinical judgement create a POC that gives a: MODERATE COMPLEXITY  
 
 
 
TREATMENT:  
(In addition to Assessment/Re-Assessment sessions the following treatments were rendered) Pre-treatment Symptoms/Complaints:   
Pain: Initial:  
Pain Intensity 1: 0  Post Session:  same Therapeutic Activity: (    14 minutes): Therapeutic activities including Bed transfers, Chair transfers, Ambulation on level ground and RW management to improve mobility, strength, balance, coordination and activity tolerance. Required minimal Safety awareness training;Verbal cues to promote dynamic balance in standing and promote coordination of bilateral, upper extremity(s), lower extremity(s). Pt practiced functional transfers with CGA, min cues for hand placement/safety. Steps to bed with RW/CGA-SBA, min cues for RW management. Could benefit from bariatric RW, current RW in room (looks like [facility]) is too narrow. Stand > sit with CGA/cues for hand placement. Seated rest break, sats drop to 88% on Airvo, recover with breathing technique. Sit > supine with Elle to advance BLEs. Cues throughout for breathing technique. Braces/Orthotics/Lines/Etc:  
· elam catheter · O2 Device: Heated, Hi flow nasal cannula Treatment/Session Assessment:   
Response to Treatment:  SOB with minimal activity · Interdisciplinary Collaboration:  
o Occupational Therapist 
· After treatment position/precautions:  
o Supine in bed 
o Bed/Chair-wheels locked 
o Bed in low position 
o Call light within reach 
o Side rails x 3  
· Compliance with Program/Exercises: Compliant all of the time, Will assess as treatment progresses. · Recommendations/Intent for next treatment session: \"Next visit will focus on advancements to more challenging activities and reduction in assistance provided\". Total Treatment Duration: OT Patient Time In/Time Out Time In: 2816 Time Out: 3440 [address] Lai OTR/L

## 2020-08-21 NOTE — PROGRESS NOTES
Patient stated she had a horrible experience with V60 CPAP mode last night and does not want anything to do with positive pressure therapy. We discussed the difference between the V60 and the Trilogy home unit. I assured patient I would come back to work with her.

## 2020-08-21 NOTE — PROGRESS NOTES
Per radiologist, Dr. Sue Benoit, put CT Renal Mass protocol on hold until 8/22 due to receiving IV contrast last night. Spoke with Dr. Candelario Pierce and she is aware.

## 2020-08-21 NOTE — PROGRESS NOTES
08/20/20 2201 Oxygen Therapy O2 Sat (%) (!) 86 % Pulse via Oximetry 98 beats per minute O2 Device CPAP mask 
(V60) FIO2 (%) 45 % (increased from 40% to 45% Oxygen ; SAT now 92%) Respiratory Respiratory (WDL) X Respiratory Pattern Regular Chest/Tracheal Assessment Chest expansion, symmetrical  
Breath Sounds Bilateral Diminished CPAP/BIPAP  
CPAP/BIPAP Start/Stop On Device Mode AVAP 
(V60)  
$$ CPAP Daily Yes AVAP Set Resp Rate 12 AVAP Set VT (ml) 355 [employer] ID # [id number] Mask Type and Size Full face; Medium Skin Condition intact PIP Observed 19 cm H20  
EPAP (cm H2O) 8 cm H2O Inspiratory Time (sec) 1 seconds Vt Spont (ml) 358 ml Ve Observed (l/min) 11.5 l/min Backup Rate 12 Total RR (Spontaneous) 32 breaths per minute Insp Rise Time (sec) 3 Leak (Estimated) 0 L/min  
Pt's Home Machine No  
Biomedical Check Performed Yes Settings Verified Yes Alarm Settings High Pressure 50 Low Pressure 5 Apnea 20 Low Ve 2 High Rate 50 Low Rate 8 Pulmonary Toilet Pulmonary Toilet H. O.B elevated Patient placed on NIVV60) for QHS per setting recommendations found in Sleep navigator note. Patient FiO2 was increased from 40% to 45% to bring SAT from 86% to 92%. Patient placed on CAP #6 for continuous oximetry study. Monitor room notified.

## 2020-08-21 NOTE — PROGRESS NOTES
Follow-up visit by  to convey care and concern and to explore spiritual needs. No needs were voiced during the visit. Chaplains remain available for follow-up care. Buddy Dempsey MDiv Board Certified [employer]

## 2020-08-21 NOTE — PROGRESS NOTES
Problem: Mobility Impaired (Adult and Pediatric) Goal: *Acute Goals and Plan of Care (Insert Text) Outcome: Progressing Towards Goal 
Note: ACUTE PT GOALS: 
1. Patient will perform bed mobility with MODIFIED INDEPENDENCE within 7 days. 2. Patient will transfer bed to chair with MODIFIED INDEPENDENCE within 7 days. 3. Patient will demonstrate FAIR+ DYNAMIC STANDING balance within 7 day(s). 4. Patient will ambulate 75ft+ using least restrictive assistive device and SUPERVISION within 7 days. 5. Patient will tolerate 25+ minutes of therapeutic activity/exercise and/or neuromuscular re-education while maintaining stable vitals to improve functional strength and activity tolerance within 7 days. PHYSICAL THERAPY: Daily Note and AM 8/21/2020 INPATIENT: PT Visit Days : 2 [place] / [place] B / Product Type: Medicare /   
  
NAME/AGE/GENDER: Earnestine Gibbons is a 78 y.o. female PRIMARY DIAGNOSIS: Acute on chronic respiratory failure with hypoxia (HCC) [J96.21] Acute on chronic respiratory failure with hypoxia (HCC) Acute on chronic respiratory failure with hypoxia (HCC) ICD-10: Treatment Diagnosis:  
 · Generalized Muscle Weakness (M62.81) · Difficulty in walking, Not elsewhere classified (R26.2) Precaution/Allergies: 
Pseudoephedrine ASSESSMENT:  
 
Ms. Mirza Hathaway is a pleasant 78year old female admitted with acute on chronic respiratory failure, on 60% Airvo, and endorses 0/10 pain. Patient lives with her spouse in a two story residence, residing on first level, with 6 steps to enter. At baseline, patient ambulates modified household level distances without utilizing an assistive device and utilizes a motorized scooter for community level distances. Patient endorses poor activity tolerance with ADLs, IADLs, and community-level ambulation. Chronically on 4L O2. This morning, pt is supine in bed and agreed to therapy.   Pt performed supine to sit with min assist and additional time. Pt stood and ambulated about 10' to chair using rolling walker and CGA. Pt with a slow, step to gait pattern. Pt was able to perform below LE exercises with rest breaks as needed. Pt's sats remained >90 throughout session. Pt was left up in chair with needs in reach. Pt making slow progress towards goals. Will continue with POC. This section established at most recent assessment PROBLEM LIST (Impairments causing functional limitations): 1. Decreased Strength 2. Decreased ADL/Functional Activities 3. Decreased Transfer Abilities 4. Decreased Ambulation Ability/Technique 5. Decreased Balance 6. Increased Pain 7. Decreased Activity Tolerance 8. Decreased Pacing Skills 9. Decreased Work Simplification/Energy Conservation Techniques 10. Decreased Flexibility/Joint Mobility INTERVENTIONS PLANNED: (Benefits and precautions of physical therapy have been discussed with the patient.) 1. Balance Exercise 2. Bed Mobility 3. Family Education 4. Gait Training 5. Home Exercise Program (HEP) 6. Neuromuscular Re-education/Strengthening 7. Range of Motion (ROM) 8. Therapeutic Activites 9. Therapeutic Exercise/Strengthening 10. Transfer Training TREATMENT PLAN: Frequency/Duration: 3 times a week for duration of hospital stay Rehabilitation Potential For Stated Goals: Good REHAB RECOMMENDATIONS (at time of discharge pending progress):   
Placement: It is my opinion, based on this patient's performance to date, that Ms. Boswell may benefit from [address] after discharge due to the functional deficits listed above that are likely to improve with skilled rehabilitation because he/she has multiple medical issues that affect his/her functional mobility in the community. Equipment: ? TBD; may need RW  
    
 
 
 
HISTORY:  
History of Present Injury/Illness (Reason for Referral): Difficulty in walking; Generalized weakness Past Medical History/Comorbidities: Ms. Jerome Sharma  has a past medical history of Bronchiectasis ([facility] [address] Congestive heart failure (CHF) ([facility] [address] HTN (hypertension), Inguinal hernia, and Pneumonia. Ms. Jerome Sharma  has a past surgical history that includes hx hysterectomy and hx back surgery. Social History/Living Environment:  
Home Environment: Private residence # Steps to Enter: 6 Rails to Enter: Yes Hand Rails : Bilateral 
One/Two Story Residence: One story # of Interior Steps: 25 Living Alone: No 
Support Systems: Spouse/Significant Other/Partner Patient Expects to be Discharged to[de-identified] Private residence Current DME Used/Available at Home: Oxygen, portable Tub or Shower Type: Tub/Shower combination Prior Level of Function/Work/Activity: 
Patient lives with her spouse in a two story residence, residing on first level, with 6 steps to enter. At baseline, patient ambulates modified household level distances without utilizing an assistive device and utilizes a motorized scooter for community level distances. Patient endorses poor activity tolerance with ADLs, IADLs, and community-level ambulation. Number of Personal Factors/Comorbidities that affect the Plan of Care: 1-2: MODERATE COMPLEXITY EXAMINATION:  
Most Recent Physical Functioning:  
Gross Assessment: 
  
         
  
Posture: 
  
Balance: 
Sitting - Static: Good (unsupported); Fair (occasional) Sitting - Dynamic: Fair (occasional); Prop sitting Standing - Static: Fair;Constant support Standing - Dynamic : Fair;Constant support Bed Mobility: 
Supine to Sit: Minimum assistance; Additional time Scooting: Stand-by assistance Wheelchair Mobility: 
  
Transfers: 
Sit to Stand: Contact guard assistance Stand to Sit: Contact guard assistance Bed to Chair: Contact guard assistance Gait: 
  
Base of Support: Widened Speed/Cinda: Slow;Shuffled Step Length: Right shortened;Left shortened Gait Abnormalities: Decreased step clearance; Step to gait Distance (ft): 10 Feet (ft) Assistive Device: Walker, rolling Ambulation - Level of Assistance: Contact guard assistance Interventions: Safety awareness training;Verbal cues Body Structures Involved: 1. Heart 2. Lungs 3. Muscles Body Functions Affected: 1. Cardio 2. Respiratory 3. Movement Related Activities and Participation Affected: 1. Mobility 2. Self Care 3. Domestic Life Number of elements that affect the Plan of Care: 4+: HIGH COMPLEXITY CLINICAL PRESENTATION:  
Presentation: Evolving clinical presentation with changing clinical characteristics: MODERATE COMPLEXITY CLINICAL DECISION [address] [place] [address] AM-PAC 6 Clicks Basic Mobility Inpatient Short Form How much difficulty does the patient currently have. .. Unable A Lot A Little None 1. Turning over in bed (including adjusting bedclothes, sheets and blankets)? [] 1   [] 2   [x] 3   [] 4  
2. Sitting down on and standing up from a chair with arms ( e.g., wheelchair, bedside commode, etc.)   [] 1   [] 2   [x] 3   [] 4  
3. Moving from lying on back to sitting on the side of the bed? [] 1   [] 2   [x] 3   [] 4 How much help from another person does the patient currently need. .. Total A Lot A Little None 4. Moving to and from a bed to a chair (including a wheelchair)? [] 1   [] 2   [x] 3   [] 4  
5. Need to walk in hospital room? [] 1   [] 2   [x] 3   [] 4  
6. Climbing 3-5 steps with a railing? [] 1   [x] 2   [] 3   [] 4  
© , Trustees of [address] [place] [address], under license to [employer]. All rights reserved Score:  Initial: 17 Most Recent: 17 (Date: 2020) Interpretation of Tool:  Represents activities that are increasingly more difficult (i.e. Bed mobility, Transfers, Gait). Medical Necessity:    
· Patient demonstrates · good ·  rehab potential due to higher previous functional level. Reason for Services/Other Comments: · Patient continues to require skilled intervention due to · Decreased functional activity tolerance and balance/gait status from baseline · . Use of outcome tool(s) and clinical judgement create a POC that gives a: Questionable prediction of patient's progress: MODERATE COMPLEXITY  
  
 
 
 
TREATMENT:  
(In addition to Assessment/Re-Assessment sessions the following treatments were rendered) Pre-treatment Symptoms/Complaints:   
Pain: Initial:  
Pain Intensity 1: 0  Post Session:  0/10; endorses comfort in bedside chair Therapeutic Activity: (    15 minutes): Therapeutic activities including Bed transfers, Chair transfers and Ambulation on level ground to improve mobility and strength. Required minimal Safety awareness training;Verbal cues to promote dynamic balance in standing. Therapeutic Exercise: (  13 minutes):  Exercises per grid below to improve mobility and strength. Required minimal visual and verbal cues to promote proper body posture and promote proper body mechanics. Progressed range and repetitions as indicated. Date: 
8/21/20 Date: 
 Date: 
  
ACTIVITY/EXERCISE AM PM AM PM AM PM  
Ambulation:           Distance Device Duration Seated Heel Raises  X 15 B Seated Toe Raises  X 15 B Seated Long Arc Quads  X 15 B Seated Marching  X 10 B Seated Hip Abduction  X 15 B      
        
B = bilateral; AA = active assistive; A = active; P = passive Braces/Orthotics/Lines/Etc:  
· IV 
· elam catheter · O2 Device: Heated, Hi flow nasal cannula, Humidifier Treatment/Session Assessment:   
· Response to Treatment:  See above. · Interdisciplinary Collaboration:  
o Physical Therapy Assistant 
o Registered Nurse · After treatment position/precautions:  
o Up in chair 
o Bed/Chair-wheels locked 
o Bed in low position 
o Call light within reach 
o RN notified · Compliance with Program/Exercises: Will assess as treatment progresses · Recommendations/Intent for next treatment session: \"Next visit will focus on advancements to more challenging activities and reduction in assistance provided\". Total Treatment Duration: PT Patient Time In/Time Out Time In: 4616 Time Out: 5468 Sherita Cortez PTA  
    
 
 
 X Size Of Lesion In Cm (Optional): 0.5

## 2020-08-21 NOTE — PROGRESS NOTES
Hospitalist Progress Note 2020 Admit Date: 2020  4:17 PM  
NAME: Severiano Boswell :  1950 MRN:  [id number] Attending: Stephanie Shukla MD 
PCP:  Barbara Hawkins MD 
 
SUBJECTIVE:  
 
79 yo CF with past history of bronchiectasis, JEANNE complicated by pulmonary hypertension with chronic hypoxic respiratory failure (on 4L oxygen at home), Afib (on Pradaxa) presents with worsening shortness of breath. Denies associated fevers/chills, chest pain, worsening productive cough, abdominal pain, nausea/vomiting or diarrhea. Patient was outside her PCP office with SpO2 of 60% on her home 4L. She was transported via EMS and placed on BiPAP. At bedside, patient says she has not missed any doses of her medications \"except for my potassium. \" In ED, pt weaned to [address], FiO2 70%, CXR with bibasilar airspace opacities. She was started on Lasix IV, pulm and cards consulted. Pt on NIV overnight, does not like it and wants to know how long she needs to wear it. Updated on findings of CT, pt denies any known history of kidney mass, no hematuria. Review of Systems negative with exception of pertinent positives noted above PHYSICAL EXAM  
 
Patient Vitals for the past 24 hrs: 
 Temp Pulse Resp BP SpO2  
20 1143     98 % 20 1138 98.4 °F (36.9 °C) 90 19 133/83 96 % 20 0752     93 % 20 0743 98.2 °F (36.8 °C) 91 20 105/64 99 % 20 0527 98.2 °F (36.8 °C) (!) 105 20 116/70 93 % 20 0008 98.2 °F (36.8 °C) 87 20 124/73 97 % 20 2201     (!) 86 % 20 2006 98.2 °F (36.8 °C) 92 20 116/74 90 % 20 1918     98 % 20 1709    102/54   
20 1507 97.3 °F (36.3 °C) 97 20 98/65 97 % 20 1415     90 % Oxygen Therapy O2 Sat (%): 98 % (20 1143) Pulse via Oximetry: 110 beats per minute (20 1143) O2 Device: Heated; Hi flow nasal cannula (20 1143) O2 Flow Rate (L/min): 40 l/min (08/21/20 1143) O2 Temperature: 87.8 °F (31 °C) (08/21/20 1143) FIO2 (%): 50 % (08/21/20 1144) Intake/Output Summary (Last 24 hours) at 8/21/2020 1208 Last data filed at 8/21/2020 5442 Gross per 24 hour Intake 1320 ml Output 2150 ml Net -830 ml General: No acute distress, obese Lungs:  Decreased BS BL bases Heart:  Regular rate and rhythm,  No murmur, rub, or gallop Abdomen: Soft, Non distended, Non tender, Positive bowel sounds Extremities: 1-2+ BL LE edema Neurologic:  No focal deficits Psych:  Calm, cooperative Recent Results (from the past 24 hour(s)) D DIMER Collection Time: 08/20/20  2:36 PM  
Result Value Ref Range D DIMER 1.75 (HH) <0.56 ug/ml(FEU) METABOLIC PANEL, BASIC Collection Time: 08/21/20  7:02 AM  
Result Value Ref Range Sodium 142 136 - 145 mmol/L Potassium 3.9 3.5 - 5.1 mmol/L Chloride 99 98 - 107 mmol/L  
 CO2 38 (H) 21 - 32 mmol/L Anion gap 5 (L) 7 - 16 mmol/L Glucose 94 65 - 100 mg/dL BUN 15 8 - 23 MG/DL Creatinine 0.91 0.6 - 1.0 MG/DL  
 GFR est AA >60 >60 ml/min/1.73m2 GFR est non-AA >60 >60 ml/min/1.73m2 Calcium 8.0 (L) 8.3 - 10.4 MG/DL MAGNESIUM Collection Time: 08/21/20  7:02 AM  
Result Value Ref Range Magnesium 2.2 1.8 - 2.4 mg/dL CBC WITH AUTOMATED DIFF Collection Time: 08/21/20  7:02 AM  
Result Value Ref Range WBC 6.7 4.3 - 11.1 K/uL  
 RBC 3.58 (L) 4.05 - 5.2 M/uL HGB 8.6 (L) 11.7 - 15.4 g/dL HCT 29.8 (L) 35.8 - 46.3 % MCV 83.2 79.6 - 97.8 FL  
 MCH 24.0 (L) 26.1 - 32.9 PG  
 MCHC 28.9 (L) 31.4 - 35.0 g/dL  
 RDW 19.2 (H) 11.9 - 14.6 % PLATELET 552 924 - 458 K/uL MPV 10.1 9.4 - 12.3 FL ABSOLUTE NRBC 0.00 0.0 - 0.2 K/uL  
 DF AUTOMATED NEUTROPHILS 76 43 - 78 % LYMPHOCYTES 9 (L) 13 - 44 % MONOCYTES 11 4.0 - 12.0 % EOSINOPHILS 3 0.5 - 7.8 % BASOPHILS 0 0.0 - 2.0 % IMMATURE GRANULOCYTES 0 0.0 - 5.0 % ABS. NEUTROPHILS 5.1 1.7 - 8.2 K/UL  
 ABS. LYMPHOCYTES 0.6 0.5 - 4.6 K/UL  
 ABS. MONOCYTES 0.8 0.1 - 1.3 K/UL  
 ABS. EOSINOPHILS 0.2 0.0 - 0.8 K/UL  
 ABS. BASOPHILS 0.0 0.0 - 0.2 K/UL  
 ABS. IMM. GRANS. 0.0 0.0 - 0.5 K/UL Micro: All Micro Results Procedure Component Value Units Date/Time CULTURE, BLOOD [[id number]] Collected:  08/19/20 1640 Order Status:  Completed Specimen:  Blood Updated:  08/21/20 1056 Special Requests: --     
  RIGHT Antecubital 
  
  Culture result: NO GROWTH 2 DAYS     
 CULTURE, BLOOD [[id number]] Collected:  08/19/20 1629 Order Status:  Completed Specimen:  Blood Updated:  08/20/20 1148 Special Requests: --     
  LEFT 
ARM 
  
  GRAM STAIN GRAM POSITIVE RODS AEROBIC BOTTLE POSITIVE RESULTS VERIFIED, PHONED TO AND READ BACK BY [facility]9 Scotty Newton RN @5632 8/20/20 [place] Culture result:    
  CULTURE IN PROGRESS,FURTHER UPDATES TO FOLLOW Other studies last 24 hours: 
Xr Chest Sngl V Result Date: 8/20/2020 IMPRESSION:  Significant cardiomegaly without acute abnormality Ct Chest W Cont Result Date: 8/20/2020 IMPRESSION:  1. No evidence for pulmonary embolism. 2. Multiple findings suggesting evolving fluid overload. This includes bilateral pulmonary edema, small to moderate bilateral pleural effusions, worsening now severe anasarca, and increasing now small to moderate ascites. 3. New abnormal changes about the partially visualized left kidney which are incompletely characterized. This demonstrates some internal density and therefore process such as neoplasm cannot be excluded. This would be best further assessed with a preferably contrasted CT scan of the abdomen. Given that a renal mass falls within the differential, this should be performed as a renal mass protocol CT. This report was made using voice transcription.  Despite my best efforts to avoid any, transcription errors may persist. If there is any question about the accuracy of the report or need for clarification, then please call [phone], or text me through perfectserv for clarification or correction. All imaging, labs reviewed. ASSESSMENT Hospital Problems as of 8/21/2020 Date Reviewed: 8/21/2020 Codes Class Noted - Resolved POA Moderate to severe pulmonary hypertension (HCC) ICD-10-CM: I27.20 ICD-9-CM: 416.8  8/20/2020 - Present Yes Acute diastolic CHF (congestive heart failure) (HCC) ICD-10-CM: I50.31 ICD-9-CM: 428.31, 428.0  8/20/2020 - Present Yes * (Principal) Acute on chronic respiratory failure with hypoxia [facility]) ICD-10-CM: J96.21 
ICD-9-CM: 518.84, 799.02  8/19/2020 - Present Yes Obesity hypoventilation syndrome (HCC) (Chronic) ICD-10-CM: H06.5 ICD-9-CM: 278.03  6/23/2020 - Present Yes Atrial fibrillation (HCC) (Chronic) ICD-10-CM: I48.91 
ICD-9-CM: 427.31  6/20/2020 - Present Yes Suspected sleep apnea (Chronic) ICD-10-CM: I14.481 ICD-9-CM: 781.99  6/20/2020 - Present Yes Bronchiectasis ([address] (Chronic) ICD-10-CM: J47.9 ICD-9-CM: 494.0  5/5/2020 - Present Yes Hypertension (Chronic) ICD-10-CM: I10 
ICD-9-CM: 401.9  5/5/2020 - Present Yes Acute on chronic respiratory failure with hypoxia, on 4L oxygen at home Acute diastolic heart failure JEANNE AirVo down to 45%. - ECHO 6/20 with preserved EF 
- continue Lasix - pulm and cards following - CTA chest without PE, shows BL pulm edema, small-mod BL pleural effusions, severe anasarca, small to moderate ascites - CPAP qHS 
- wean O2 as tolerated 
  
GPR in blood culture Suspect contaminate. 
- follow blood cxs 
- hold on abx for now unless HD compromise or fever L kidney abnormality Incidentally seen on CT. 
- renal mass protocol CT ordered Afib 
- currently rate controlled 
- continue diltiazem, metoprolol, Pradaxa 
  
RAMYA, resolved 
  
HTN 
 - SBP on lower side - decrease metoprolol, losartan with hold parameters 
  
Proph - Pradaxa Dispo - too early to tell 
  
 
 
Signed By: Reanna Jacobo MD   
 August 21, 2020

## 2020-08-21 NOTE — PROGRESS NOTES
[facility] Admission Date: 8/19/2020 Daily Progress Note: 8/21/2020 The patient's chart is reviewed and the patient is discussed with the staff. 78 y.o.  female seen and evaluated at the request of Dr. Jade Ryan. She was admitted with hypoxia (sat of 60% on 4 liters oxygen). She was initially on bipap but has been weaned to 60% airvo. She describes a gradual increase in her dyspnea and lack of mobility due to shortness of breath, dating back to December. She was  Hospitalized in May and June with volume overload. Her echo in June reported a normal EF but her RV was dilated and her RVSP measured 75 to 80 mmHg. She is maintained on 4 liters of oxygen. She is morbidly obese and a sleep study has been ordered but she states that the office has not contacted her yet. She reports daytime fatigue and somnolence. She was seen in our office on August 6 and she complained of edema and dyspnea then. Her lasix was increased from 40 mg per day to BID. She was still on this dose at the time of this admission. She does not weigh herself nor limit her fluid intake and she feels very edematous now. Her BNP was 5211 on admission and she was hypertensive. She is currently on 40 mg lasix IV per day and her fluid balance is neg 1.1 liters. She has a history of bronchiectasis which has been stable. Her PFTs this month showed pure restriction. Subjective: HOLDEN done on 45% AVAPS last night revealing O2 max 100%, O2 min 29%, 7 desaturations less than 3 minutes. Patient states she does not like NIV; attempted to explain necessity of device, patient still with some resistance. Current Facility-Administered Medications Medication Dose Route Frequency  sodium chloride (NS) flush 5-40 mL  5-40 mL IntraVENous Q8H  
 sodium chloride (NS) flush 5-40 mL  5-40 mL IntraVENous PRN  
 acetaminophen (TYLENOL) tablet 650 mg  650 mg Oral Q6H PRN  Or  
  acetaminophen (TYLENOL) suppository 650 mg  650 mg Rectal Q6H PRN  polyethylene glycol (MIRALAX) packet 17 g  17 g Oral DAILY PRN  promethazine (PHENERGAN) tablet 12.5 mg  12.5 mg Oral Q6H PRN Or  
 ondansetron (ZOFRAN) injection 4 mg  4 mg IntraVENous Q6H PRN  
 acetaminophen (TYLENOL) tablet 650 mg  650 mg Oral Q6H PRN  
 albuterol (PROVENTIL VENTOLIN) nebulizer solution 2.5 mg  2.5 mg Nebulization Q4H PRN  
 aspirin chewable tablet 81 mg  81 mg Oral DAILY  dabigatran etexilate (PRADAXA) capsule 150 mg  150 mg Oral Q12H  
 dilTIAZem ER (CARDIZEM CD) capsule 120 mg  120 mg Oral DAILY  furosemide (LASIX) injection 40 mg  40 mg IntraVENous BID  losartan (COZAAR) tablet 50 mg  50 mg Oral DAILY  metoprolol tartrate (LOPRESSOR) tablet 100 mg  100 mg Oral Q12H  potassium chloride (K-DUR, KLOR-CON) SR tablet 40 mEq  40 mEq Oral BID  nystatin (MYCOSTATIN) 100,000 unit/gram powder   Topical BID Review of Systems Constitutional: negative for fever, chills, sweats Cardiovascular: negative for chest pain, palpitations, syncope, edema Gastrointestinal:  negative for dysphagia, reflux, vomiting, diarrhea, abdominal pain, or melena Neurologic:  negative for focal weakness, numbness, headache Objective:  
 
Vitals:  
 08/21/20 6989 08/21/20 2939 08/21/20 1067 08/21/20 6229 BP: 116/70  105/64 Pulse: (!) 105  91 Resp: 20  20 Temp: 98.2 °F (36.8 °C)  98.2 °F (36.8 °C) SpO2: 93%  99% 93% Weight:  292 lb 9.6 oz (132.7 kg) Height:      
 
 
 
Intake/Output Summary (Last 24 hours) at 8/21/2020 1025 Last data filed at 8/21/2020 5961 Gross per 24 hour Intake 1320 ml Output 2150 ml Net -830 ml Physical Exam:  
Constitution:  the patient is well developed and in no acute distress EENMT:  Sclera clear, pupils equal, oral mucosa moist 
Respiratory: Diminished, on AirVo 40L/60% Cardiovascular:  RRR without M,G,R 
 Gastrointestinal: soft and non-tender; with positive bowel sounds. Musculoskeletal: warm without cyanosis. There is +1 lower extremity edema. Skin:  no jaundice or rashes, no wounds, scattered bruising Neurologic: no gross neuro deficits Psychiatric:  alert and oriented x 3 CXR:  8/20/2020 IMPRESSION:  Significant cardiomegaly without acute abnormality CT CHEST W/CONTRAST:  8/20/2020 IMPRESSION:   
1. No evidence for pulmonary embolism. 
  
2. Multiple findings suggesting evolving fluid overload. This includes bilateral 
pulmonary edema, small to moderate bilateral pleural effusions, worsening now 
severe anasarca, and increasing now small to moderate ascites. 
  
3. New abnormal changes about the partially visualized left kidney which are 
incompletely characterized. This demonstrates some internal density and 
therefore process such as neoplasm cannot be excluded. This would be best 
further assessed with a preferably contrasted CT scan of the abdomen. Given that 
a renal mass falls within the differential, this should be performed as a renal 
mass protocol CT. LAB No results for input(s): GLUCPOC in the last 72 hours. No lab exists for component: [place] Recent Labs  
  08/21/20 
0252 08/20/20 
0606 08/19/20 
1629 WBC 6.7 7.5 7.8 HGB 8.6* 9.2* 9.4* HCT 29.8* 31.7* 33.5*  
 191 232 INR  --   --  1.4 Recent Labs  
  08/21/20 
1330 08/20/20 
9959 08/19/20 
1629  143 142  
K 3.9 4.1 4.3 CL 99 100 100 CO2 38* 35* 39* GLU 94 81 135* BUN 15 17 17 CREA 0.91 1.08* 1.28* MG 2.2 2.1 2.1 CA 8.0* 8.3 8.3 ALB  --   --  2.9* TBILI  --   --  1.2* ALT  --   --  17 No results for input(s): PH, PCO2, PO2, HCO3, PHI, PCO2I, PO2I, HCO3I in the last 72 hours. Recent Labs 08/19/20 
1921 08/19/20 
1632 LAC 1.5 2.3* Assessment:  (Medical Decision Making) Hospital Problems  Date Reviewed: 8/6/2020 Codes Class Noted POA Moderate to severe pulmonary hypertension (HCC) ICD-10-CM: I27.20 ICD-9-CM: 416.8  8/20/2020 Yes Acute diastolic CHF (congestive heart failure) (HCC) ICD-10-CM: I50.31 ICD-9-CM: 428.31, 428.0  8/20/2020 Yes * (Principal) Acute on chronic respiratory failure with hypoxia (HCC) ICD-10-CM: J96.21 
ICD-9-CM: 518.84, 799.02  8/19/2020 Yes Obesity hypoventilation syndrome (HCC) (Chronic) ICD-10-CM: A67.7 ICD-9-CM: 278.03  6/23/2020 Yes Atrial fibrillation (HCC) (Chronic) ICD-10-CM: I48.91 
ICD-9-CM: 427.31  6/20/2020 Yes Suspected sleep apnea (Chronic) ICD-10-CM: K61.268 ICD-9-CM: 781.99  6/20/2020 Yes Bronchiectasis ([address] (Chronic) ICD-10-CM: J47.9 ICD-9-CM: 494.0  5/5/2020 Yes Hypertension (Chronic) ICD-10-CM: I10 
ICD-9-CM: 401.9  5/5/2020 Yes Plan:  (Medical Decision Making) --Blood cultures pending, 1/2 GPR+ aerobic bottle 
--Continue albuterol nebulizers 
--On AirVo 40L, wean as tolerated 
--Patient qualifies for home NIV. Patient requires NIV 8-24 hours/day via face mask for respiratory failure and rapidly changing lung volumes as well as MPV preventing daytime exacerbations and illness. Alternative methods of ventilatory support have been tried such as a bi-level device, bi-level with back-up rate and failed to maintain patient's PCO2 levels to a safe and manageable range. Patient required targeted volumes, back up rate and battery with alarms. This patient is at risk for exacerbations leading quickly to possible decline and harm without this therapy. In patients with severe chronic hypercapnia and a history of hospitalization for acute respiratory failure, long-term non-invasive ventilation may decrease mortality and prevent re-hospitalization. --Continue daily weights --Will need to reassess O2 needs at discharge More than 50% of the time documented was spent in face-to-face contact with the patient and in the care of the patient on the floor/unit where the patient is located. Terry Avelar NP I have spoken with and examined the patient. I agree with the above assessment and plan as documented. Suspect secondary PH due to severe JEANNE not on therapy. She also has evidence of OHS with compensated ABG with PCO2 of 74.9. I walked into room as patient dozed and her  O2 sat was consistently 80% despite airvo. Upon awakening it diane to 92%. We discussed sleep apnea and OHS and its consequences including possible pulmonary hypertension. Gen: pleasant, obese, dyspneic Lungs: crackles posteriorly Heart:  RRR with no Murmur/Rubs/Gallops Abd: NTND Ext:  ++edema 
 
--requires NIV nightly as above 
--needs continued diuresis --sent DIONTE, HCV, reviewed CT chest.  No PE. 
--when euvolemic and treated with NIV 3 months consistently, she is likely to require RHC for evaluation of PH. 
--Pulmonary edema (probably cause of GGO) probably doesn't reflect PH and is typically a sign of left heart failure. Suspect there may be element of diastolic heart disease (TTE indeterminate diastolic function, obesity, untreated JEANNE). Will start low dose spironolactone in addition to lasix. Monitor K closely.  
 
 
Valorie Sahu MD

## 2020-08-22 NOTE — PROGRESS NOTES
Problem: Pressure Injury - Risk of 
Goal: *Prevention of pressure injury Description: Document Greg Scale and appropriate interventions in the flowsheet. Outcome: Progressing Towards Goal 
Note: Pressure Injury Interventions: 
Sensory Interventions: Assess changes in LOC Moisture Interventions: Absorbent underpads Activity Interventions: Increase time out of bed Mobility Interventions: HOB 30 degrees or less Nutrition Interventions: Document food/fluid/supplement intake Friction and Shear Interventions: HOB 30 degrees or less Problem: Patient Education: Go to Patient Education Activity Goal: Patient/Family Education Outcome: Progressing Towards Goal 
  
Problem: Falls - Risk of 
Goal: *Absence of Falls Description: Document Sharmin López Fall Risk and appropriate interventions in the flowsheet. Outcome: Progressing Towards Goal 
Note: Fall Risk Interventions: 
Mobility Interventions: Bed/chair exit alarm Medication Interventions: Bed/chair exit alarm Elimination Interventions: Bed/chair exit alarm Problem: Patient Education: Go to Patient Education Activity Goal: Patient/Family Education Outcome: Progressing Towards Goal 
  
Problem: General Medical Care Plan Goal: *Vital signs within specified parameters Outcome: Progressing Towards Goal 
Goal: *Labs within defined limits Outcome: Progressing Towards Goal 
Goal: *Absence of infection signs and symptoms Outcome: Progressing Towards Goal 
Goal: *Optimal pain control at patient's stated goal 
Outcome: Progressing Towards Goal 
Goal: *Skin integrity maintained Outcome: Progressing Towards Goal 
Goal: *Fluid volume balance Outcome: Progressing Towards Goal 
Goal: *Optimize nutritional status Outcome: Progressing Towards Goal 
Goal: *Anxiety reduced or absent Outcome: Progressing Towards Goal 
Goal: *Progressive mobility and function (eg: ADL's) Outcome: Progressing Towards Goal 
  
 Problem: Patient Education: Go to Patient Education Activity Goal: Patient/Family Education Outcome: Progressing Towards Goal 
  
Problem: Patient Education: Go to Patient Education Activity Goal: Patient/Family Education Outcome: Progressing Towards Goal 
  
Problem: Patient Education: Go to Patient Education Activity Goal: Patient/Family Education Outcome: Progressing Towards Goal

## 2020-08-22 NOTE — PROGRESS NOTES
8/22/2020 9:28 AM 
 
Admit Date: 8/19/2020 Admit Diagnosis: Acute on chronic respiratory failure with hypoxia ([address] [J96.21] Subjective:  
Breathing better- no cp Objective:  
  
Visit Vitals /63 (BP 1 Location: Left arm, BP Patient Position: At rest) Pulse (!) 130 Temp 98.4 °F (36.9 °C) Resp 26 Ht 5' 2\" (1.575 m) Wt 290 lb 4.8 oz (131.7 kg) SpO2 90% BMI 53.10 kg/m² Physical Exam: 
Alie Carrascosen, Well Nourished, No Acute Distress, Alert & Oriented x 3, appropriate mood. Neck- supple, no JVD 
CV- irregular rate and rhythm no MRG Lung- clear bilaterally Abd- soft, nontender, nondistended Ext- 2 plus edema bilaterally. Skin- warm and dry Data Review:  
Recent Labs  
  08/22/20 
0550  08/19/20 
1629    < > 142 K 3.8   < > 4.3 BUN 14   < > 17 CREA 0.89   < > 1.28* WBC 6.6   < > 7.8 HGB 8.3*   < > 9.4* HCT 28.2*   < > 33.5*  
   < > 232 INR  --   --  1.4  
 < > = values in this interval not displayed. Assessment/Plan:  
 
Principal Problem: 
  Acute on chronic respiratory failure with hypoxia (HCC) (8/19/2020)Improved with current therapy. Will continue medications Better diuresis on higher dose lasix- ordered bmp for am 
 
Active Problems: 
  Bronchiectasis ([address] (5/5/2020) Hypertension (5/5/2020)Stable. Continue current medical therapy. Atrial fibrillation ([address] (6/20/2020)- rate intermittently up- increase lopressor and stop aldactone Suspected sleep apnea (6/20/2020) Obesity hypoventilation syndrome ([address] (6/23/2020) Moderate to severe pulmonary hypertension ([address] (8/20/2020) Acute diastolic CHF (congestive heart failure) ([address] (8/20/2020)Improved with current therapy. Will continue medications

## 2020-08-22 NOTE — PROGRESS NOTES
08/21/20 2100 Oxygen Therapy O2 Sat (%) 96 % Pulse via Oximetry 111 beats per minute O2 Device Other (comment) (AVAPS) FIO2 (%) 45 % Respiratory Respiratory (WDL) X Respiratory Pattern Dyspnea at rest  
Chest/Tracheal Assessment Chest expansion, symmetrical  
Breath Sounds Bilateral Diminished CPAP/BIPAP  
CPAP/BIPAP Start/Stop On Device Mode AVAP  
$$ CPAP Daily Yes AVAP Set Resp Rate 12 AVAP Set VT (ml) 355 [employer] ID # [id number] Mask Type and Size Full face; Medium Skin Condition intact PIP Observed 14 cm H20  
EPAP (cm H2O) 8 cm H2O Inspiratory Time (sec) 1 seconds Vt Spont (ml) 540 ml Ve Observed (l/min) 11.3 l/min Backup Rate 12 Total RR (Spontaneous) 20 breaths per minute Insp Rise Time (sec) 3 Leak (Estimated) 1 L/min  
Pt's Home Machine No  
Biomedical Check Performed Yes Settings Verified Yes Alarm Settings High Pressure 30 Low Pressure 5 Apnea 20 Low Ve 2 High Rate 50 Low Rate 5 Pulmonary Toilet Pulmonary Toilet H. O.B elevated Patient on AVAPS tonight

## 2020-08-22 NOTE — PROGRESS NOTES
Hospitalist Progress Note 2020 Admit Date: 2020  4:17 PM  
NAME: Faheem Boswell :  1950 MRN:  [id number] Attending: Neelima Calderon MD 
PCP:  Josse Khan MD 
 
SUBJECTIVE:  
 
79 yo CF with past history of bronchiectasis, JEANNE complicated by pulmonary hypertension with chronic hypoxic respiratory failure (on 4L oxygen at home), Afib (on Pradaxa) presents with worsening shortness of breath. Denies associated fevers/chills, chest pain, worsening productive cough, abdominal pain, nausea/vomiting or diarrhea. Patient was outside her PCP office with SpO2 of 60% on her home 4L. She was transported via EMS and placed on BiPAP. At bedside, patient says she has not missed any doses of her medications \"except for my potassium. \" In ED, pt weaned to [address] [place], FiO2 70%, CXR with bibasilar airspace opacities. She was started on Lasix IV, pulm and cards consulted. SOB is improving. Review of Systems negative with exception of pertinent positives noted above PHYSICAL EXAM  
 
Patient Vitals for the past 24 hrs: 
 Temp Pulse Resp BP SpO2  
20 0818 98.4 °F (36.9 °C) (!) 130 26 106/63 90 % 20 0735     91 % 20 0541 97.9 °F (36.6 °C) 76 29 111/66 95 % 20 0031 98 °F (36.7 °C) (!) 59 20 99/63 100 % 20 2100     96 % 20 98.4 °F (36.9 °C) (!) 112 24 122/74 94 % 20 1540 98.5 °F (36.9 °C) 92 18 128/80 96 % 20 1503     95 % 20 1143     98 % 20 1138 98.4 °F (36.9 °C) 90 19 133/83 96 % Oxygen Therapy O2 Sat (%): 90 % (20) Pulse via Oximetry: 137 beats per minute (20 0735) O2 Device: Hi flow nasal cannula (20) O2 Flow Rate (L/min): 40 l/min (20) O2 Temperature: 87.8 °F (31 °C) (20) FIO2 (%): 45 % (20) Intake/Output Summary (Last 24 hours) at 2020 1042 Last data filed at 2020 4637 Gross per 24 hour Intake 600 ml Output 2725 ml Net -2125 ml General: No acute distress, obese Lungs:  Decreased BS BL bases Heart:  Irregularly irregular, rates 120-130s Abdomen: Soft, Non distended, Non tender, Positive bowel sounds Extremities: 1-2+ BL LE edema Neurologic:  No focal deficits Psych:  Calm, cooperative Recent Results (from the past 24 hour(s)) METABOLIC PANEL, BASIC Collection Time: 08/22/20  5:50 AM  
Result Value Ref Range Sodium 143 136 - 145 mmol/L Potassium 3.8 3.5 - 5.1 mmol/L Chloride 98 98 - 107 mmol/L  
 CO2 40 (H) 21 - 32 mmol/L Anion gap 5 (L) 7 - 16 mmol/L Glucose 98 65 - 100 mg/dL BUN 14 8 - 23 MG/DL Creatinine 0.89 0.6 - 1.0 MG/DL  
 GFR est AA >60 >60 ml/min/1.73m2 GFR est non-AA >60 >60 ml/min/1.73m2 Calcium 8.1 (L) 8.3 - 10.4 MG/DL MAGNESIUM Collection Time: 08/22/20  5:50 AM  
Result Value Ref Range Magnesium 2.2 1.8 - 2.4 mg/dL CBC WITH AUTOMATED DIFF Collection Time: 08/22/20  5:50 AM  
Result Value Ref Range WBC 6.6 4.3 - 11.1 K/uL  
 RBC 3.42 (L) 4.05 - 5.2 M/uL HGB 8.3 (L) 11.7 - 15.4 g/dL HCT 28.2 (L) 35.8 - 46.3 % MCV 82.5 79.6 - 97.8 FL  
 MCH 24.3 (L) 26.1 - 32.9 PG  
 MCHC 29.4 (L) 31.4 - 35.0 g/dL  
 RDW 19.1 (H) 11.9 - 14.6 % PLATELET 956 801 - 899 K/uL MPV 10.1 9.4 - 12.3 FL ABSOLUTE NRBC 0.00 0.0 - 0.2 K/uL  
 DF AUTOMATED NEUTROPHILS 76 43 - 78 % LYMPHOCYTES 10 (L) 13 - 44 % MONOCYTES 10 4.0 - 12.0 % EOSINOPHILS 3 0.5 - 7.8 % BASOPHILS 0 0.0 - 2.0 % IMMATURE GRANULOCYTES 1 0.0 - 5.0 %  
 ABS. NEUTROPHILS 5.0 1.7 - 8.2 K/UL  
 ABS. LYMPHOCYTES 0.7 0.5 - 4.6 K/UL  
 ABS. MONOCYTES 0.7 0.1 - 1.3 K/UL  
 ABS. EOSINOPHILS 0.2 0.0 - 0.8 K/UL  
 ABS. BASOPHILS 0.0 0.0 - 0.2 K/UL  
 ABS. IMM. GRANS. 0.0 0.0 - 0.5 K/UL Micro: All Micro Results Procedure Component Value Units Date/Time CULTURE, BLOOD [[id number]]  (Abnormal) Collected:  08/19/20 8977 Order Status:  Completed Specimen:  Blood Updated:  08/22/20 0703 Special Requests: --     
  LEFT 
ARM 
  
  GRAM STAIN GRAM POSITIVE RODS AEROBIC BOTTLE POSITIVE RESULTS VERIFIED, PHONED TO AND READ BACK BY [id number] Scotty Newton RN @2529 8/20/20 SC Culture result: BACILLUS MEGATERIUM     
      
  THIS ORGANISM MAY BE INDICATIVE OF CULTURE CONTAMINATION, HOWEVER, CLINICAL CORRELATION NEEDS TO BE EVALUATED, AS EACH CASE IS UNIQUE. CULTURE, BLOOD [[id number]] Collected:  08/19/20 1640 Order Status:  Completed Specimen:  Blood Updated:  08/22/20 3759 Special Requests: --     
  RIGHT Antecubital 
  
  Culture result: NO GROWTH 3 DAYS Other studies last 24 hours: 
No results found. All imaging, labs reviewed. ASSESSMENT Hospital Problems as of 8/22/2020 Date Reviewed: 8/21/2020 Codes Class Noted - Resolved POA Moderate to severe pulmonary hypertension (HCC) ICD-10-CM: I27.20 ICD-9-CM: 416.8  8/20/2020 - Present Yes Acute diastolic CHF (congestive heart failure) (HCC) ICD-10-CM: I50.31 ICD-9-CM: 428.31, 428.0  8/20/2020 - Present Yes * (Principal) Acute on chronic respiratory failure with hypoxia [facility]) ICD-10-CM: J96.21 
ICD-9-CM: 518.84, 799.02  8/19/2020 - Present Yes Obesity hypoventilation syndrome (HCC) (Chronic) ICD-10-CM: E58.1 ICD-9-CM: 278.03  6/23/2020 - Present Yes Atrial fibrillation (HCC) (Chronic) ICD-10-CM: I48.91 
ICD-9-CM: 427.31  6/20/2020 - Present Yes Suspected sleep apnea (Chronic) ICD-10-CM: T64.821 ICD-9-CM: 781.99  6/20/2020 - Present Yes Bronchiectasis ([address] (Chronic) ICD-10-CM: J47.9 ICD-9-CM: 494.0  5/5/2020 - Present Yes Hypertension (Chronic) ICD-10-CM: I10 
ICD-9-CM: 401.9  5/5/2020 - Present Yes Acute on chronic respiratory failure with hypoxia, on 4L oxygen at home Acute diastolic heart failure JEANNE Remains on AirVo 45%. - ECHO 6/20 with preserved EF 
- continue Lasix - pulm and cards following - CTA chest without PE, shows BL pulm edema, small-mod BL pleural effusions, severe anasarca, small to moderate ascites - CPAP qHS 
- wean O2 as tolerated 
  
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 L kidney abnormality Incidentally seen on CT. 
- renal mass protocol CT ordered Afib with RVR Rates 130-150s. - continue diltiazem, Pradaxa 
- resume metoprolol 
  
RAMYA, resolved 
  
HTN 
- losartan d/c'd due to soft BP 
  
Proph - Pradaxa Dispo - too early to tell 
  
 
 
Signed By: Erica Booth MD   
 August 22, 2020

## 2020-08-23 NOTE — PROGRESS NOTES
Problem: Pressure Injury - Risk of 
Goal: *Prevention of pressure injury Description: Document Greg Scale and appropriate interventions in the flowsheet. Outcome: Progressing Towards Goal 
Note: Pressure Injury Interventions: 
Sensory Interventions: Assess changes in LOC Moisture Interventions: Absorbent underpads Activity Interventions: Increase time out of bed Mobility Interventions: HOB 30 degrees or less Nutrition Interventions: Document food/fluid/supplement intake Friction and Shear Interventions: HOB 30 degrees or less Problem: Patient Education: Go to Patient Education Activity Goal: Patient/Family Education Outcome: Progressing Towards Goal 
  
Problem: Falls - Risk of 
Goal: *Absence of Falls Description: Document Vickii Bridges Fall Risk and appropriate interventions in the flowsheet. Outcome: Progressing Towards Goal 
Note: Fall Risk Interventions: 
Mobility Interventions: Bed/chair exit alarm Medication Interventions: Bed/chair exit alarm Elimination Interventions: Call light in reach Problem: Patient Education: Go to Patient Education Activity Goal: Patient/Family Education Outcome: Progressing Towards Goal 
  
Problem: General Medical Care Plan Goal: *Vital signs within specified parameters Outcome: Progressing Towards Goal 
Goal: *Labs within defined limits Outcome: Progressing Towards Goal 
Goal: *Absence of infection signs and symptoms Outcome: Progressing Towards Goal 
Goal: *Optimal pain control at patient's stated goal 
Outcome: Progressing Towards Goal 
Goal: *Skin integrity maintained Outcome: Progressing Towards Goal 
Goal: *Fluid volume balance Outcome: Progressing Towards Goal 
Goal: *Optimize nutritional status Outcome: Progressing Towards Goal 
Goal: *Anxiety reduced or absent Outcome: Progressing Towards Goal 
Goal: *Progressive mobility and function (eg: ADL's) Outcome: Progressing Towards Goal 
  
 Problem: Patient Education: Go to Patient Education Activity Goal: Patient/Family Education Outcome: Progressing Towards Goal 
  
Problem: Patient Education: Go to Patient Education Activity Goal: Patient/Family Education Outcome: Progressing Towards Goal 
  
Problem: Patient Education: Go to Patient Education Activity Goal: Patient/Family Education Outcome: Progressing Towards Goal

## 2020-08-23 NOTE — PROGRESS NOTES
Hospitalist Progress Note 2020 Admit Date: 2020  4:17 PM  
NAME: Yamilex Boswell :  1950 MRN:  [id number] Attending: Servando Wheeler MD 
PCP:  Domenico Abrams MD 
 
SUBJECTIVE:  
 
77 yo CF with past history of bronchiectasis, JEANNE complicated by pulmonary hypertension with chronic hypoxic respiratory failure (on 4L oxygen at home), Afib (on Pradaxa) presents with worsening shortness of breath. Denies associated fevers/chills, chest pain, worsening productive cough, abdominal pain, nausea/vomiting or diarrhea. Patient was outside her PCP office with SpO2 of 60% on her home 4L. She was transported via EMS and placed on BiPAP. At bedside, patient says she has not missed any doses of her medications \"except for my potassium. \" In ED, pt weaned to [address] [place], FiO2 70%, CXR with bibasilar airspace opacities. She was started on Lasix IV, pulm and cards consulted. Pt reports feeling about the same today. AirVo up to 55% from 45% yesterday. Regarding renal hematoma, pt reports a few falls over past several months, most recent over a month ago. Denies pain. Review of Systems negative with exception of pertinent positives noted above PHYSICAL EXAM  
 
Patient Vitals for the past 24 hrs: 
 Temp Pulse Resp BP SpO2  
20 0733     91 % 20 0732 98.3 °F (36.8 °C) (!) 120 20 121/61 90 % 20 0425 98.7 °F (37.1 °C) (!) 111 20 116/74 93 % 20 2349 98.2 °F (36.8 °C) (!) 106 22 103/65 95 % 20 1946 98 °F (36.7 °C) (!) 111 22 112/63 99 % 20 1537 98.6 °F (37 °C) 98 26 113/69 97 % 20 1141 98.1 °F (36.7 °C) (!) 110 22 105/54 94 % Oxygen Therapy O2 Sat (%): 91 % (20 0733) Pulse via Oximetry: 133 beats per minute (20) O2 Device: Heated; Hi flow nasal cannula (20) O2 Flow Rate (L/min): 40 l/min (20) O2 Temperature: 87.8 °F (31 °C) (20) FIO2 (%): 55 % (20) Intake/Output Summary (Last 24 hours) at 8/23/2020 3432 Last data filed at 8/23/2020 7505 Gross per 24 hour Intake 1180 ml Output 2850 ml Net -1670 ml General: No acute distress, obese Lungs:  Decreased BS BL bases Heart:  Irregularly irregular, rates 120-130s Abdomen: Soft, Non distended, Non tender, Positive bowel sounds Extremities: 1-2+ BL LE edema Neurologic:  No focal deficits Psych:  Calm, cooperative Recent Results (from the past 24 hour(s)) MAGNESIUM Collection Time: 08/23/20  6:14 AM  
Result Value Ref Range Magnesium 2.3 1.8 - 2.4 mg/dL CBC WITH AUTOMATED DIFF Collection Time: 08/23/20  6:14 AM  
Result Value Ref Range WBC 7.2 4.3 - 11.1 K/uL  
 RBC 3.50 (L) 4.05 - 5.2 M/uL HGB 8.3 (L) 11.7 - 15.4 g/dL HCT 29.4 (L) 35.8 - 46.3 % MCV 84.0 79.6 - 97.8 FL  
 MCH 23.7 (L) 26.1 - 32.9 PG  
 MCHC 28.2 (L) 31.4 - 35.0 g/dL  
 RDW 19.4 (H) 11.9 - 14.6 % PLATELET 706 998 - 842 K/uL MPV 10.8 9.4 - 12.3 FL ABSOLUTE NRBC 0.00 0.0 - 0.2 K/uL  
 DF AUTOMATED NEUTROPHILS 81 (H) 43 - 78 % LYMPHOCYTES 6 (L) 13 - 44 % MONOCYTES 10 4.0 - 12.0 % EOSINOPHILS 3 0.5 - 7.8 % BASOPHILS 0 0.0 - 2.0 % IMMATURE GRANULOCYTES 0 0.0 - 5.0 %  
 ABS. NEUTROPHILS 5.8 1.7 - 8.2 K/UL  
 ABS. LYMPHOCYTES 0.4 (L) 0.5 - 4.6 K/UL  
 ABS. MONOCYTES 0.7 0.1 - 1.3 K/UL  
 ABS. EOSINOPHILS 0.2 0.0 - 0.8 K/UL  
 ABS. BASOPHILS 0.0 0.0 - 0.2 K/UL  
 ABS. IMM. GRANS. 0.0 0.0 - 0.5 K/UL Micro: All Micro Results Procedure Component Value Units Date/Time CULTURE, BLOOD [[id number]] Collected:  08/19/20 1640 Order Status:  Completed Specimen:  Blood Updated:  08/23/20 4556 Special Requests: --     
  RIGHT Antecubital 
  
  Culture result: NO GROWTH 4 DAYS     
 CULTURE, BLOOD [[id number]]  (Abnormal) Collected:  08/19/20 1629 Order Status:  Completed Specimen:  Blood Updated:  08/22/20 0703   Special Requests: --     
  LEFT 
ARM 
  
 Alden Thomas 38. POSITIVE RODS AEROBIC BOTTLE POSITIVE RESULTS VERIFIED, PHONED TO AND READ BACK BY [id number] Scotty Newton, RN @7275 8/20/20 SC Culture result: BACILLUS MEGATERIUM     
      
  THIS ORGANISM MAY BE INDICATIVE OF CULTURE CONTAMINATION, HOWEVER, CLINICAL CORRELATION NEEDS TO BE EVALUATED, AS EACH CASE IS UNIQUE. Other studies last 24 hours: 
Ct Abd Pelv W Cont Result Date: 8/22/2020 IMPRESSION: 1. The questionable left renal mass on the chest CT is a moderate-sized left subcapsular renal hematoma. 2. Very large left hepatic lobe cyst measuring 12.6 cm with multiple smaller cysts in the right hepatic lobe. 3. Bilateral small pleural effusions with dependent atelectasis. 4. Moderate-sized umbilical fat-containing hernia with generalized anasarca throughout the anterior pelvic wall subcutaneous tissue. All imaging, labs reviewed. ASSESSMENT Hospital Problems as of 8/23/2020 Date Reviewed: 8/21/2020 Codes Class Noted - Resolved POA Moderate to severe pulmonary hypertension (HCC) ICD-10-CM: I27.20 ICD-9-CM: 416.8  8/20/2020 - Present Yes Acute diastolic CHF (congestive heart failure) (HCC) ICD-10-CM: I50.31 ICD-9-CM: 428.31, 428.0  8/20/2020 - Present Yes * (Principal) Acute on chronic respiratory failure with hypoxia [facility]) ICD-10-CM: J96.21 
ICD-9-CM: 518.84, 799.02  8/19/2020 - Present Yes Obesity hypoventilation syndrome (HCC) (Chronic) ICD-10-CM: W07.0 ICD-9-CM: 278.03  6/23/2020 - Present Yes Atrial fibrillation (HCC) (Chronic) ICD-10-CM: I48.91 
ICD-9-CM: 427.31  6/20/2020 - Present Yes Suspected sleep apnea (Chronic) ICD-10-CM: F72.899 ICD-9-CM: 781.99  6/20/2020 - Present Yes Bronchiectasis ([address] (Chronic) ICD-10-CM: J47.9 ICD-9-CM: 494.0  5/5/2020 - Present Yes Hypertension (Chronic) ICD-10-CM: I10 
ICD-9-CM: 401.9  5/5/2020 - Present Yes Acute on chronic respiratory failure with hypoxia, on 4L oxygen at home Acute diastolic heart failure JEANNE AirVo up to 55%. - ECHO 6/20 with preserved EF 
- continue Lasix - pulm and cards following - CTA chest without PE, shows BL pulm edema, small-mod BL pleural effusions, severe anasarca, small to moderate ascites - CPAP qHS 
- wean O2 as tolerated 
  
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 L renal subcapsular hematoma, moderate Incidentally seen on CT. Unclear cause. 
- discussed with renal - recommend repeat imaging in 2 days, follow hgb. If enlarging or hgb dropping, recommend to hold pradaxa Afib with RVR 
- continue diltiazem, metoprolol, Pradaxa - cards following 
  
RAMYA, resolved 
  
HTN 
- losartan d/c'd due to soft BP 
  
Proph - Pradaxa Dispo - too early to tell 
  
 
 
Signed By: Madiha Baker MD   
 August 23, 2020

## 2020-08-23 NOTE — PROGRESS NOTES
8/23/2020 10:17 AM 
 
Admit Date: 8/19/2020 Admit Diagnosis: Acute on chronic respiratory failure with hypoxia ([address] [J96.21] Subjective: No cp or sob Objective:  
  
Visit Vitals /61 (BP 1 Location: Left arm, BP Patient Position: At rest) Pulse (!) 120 Temp 98.3 °F (36.8 °C) Resp 20 Ht 5' 2\" (1.575 m) Wt 290 lb 4.8 oz (131.7 kg) SpO2 91% BMI 53.10 kg/m² Physical Exam: 
[place]af[place], Well Nourished, No Acute Distress, Alert & Oriented x 3, appropriate mood. Neck- supple, no JVD 
CV- regular rate and rhythm no MRG Lung- clear bilaterally Abd- soft, nontender, nondistended Ext- no edema bilaterally. Skin- warm and dry Data Review:  
Recent Labs  
  08/23/20 
4914 08/22/20 
0550 NA  --  143 K  --  3.8 BUN  --  14  
CREA  --  0.89 WBC 7.2 6.6 HGB 8.3* 8.3* HCT 29.4* 28.2*  
 183 Assessment/Plan:  
 
Principal Problem: 
  Acute on chronic respiratory failure with hypoxia (HCC) (8/19/2020)Improved with current therapy. Will continue medications- continue iv lasix Active Problems: 
  Bronchiectasis ([address] (5/5/2020) Hypertension (5/5/2020) Atrial fibrillation ([address] (6/20/2020)worse- add dig Suspected sleep apnea (6/20/2020) Obesity hypoventilation syndrome ([address] (6/23/2020) Moderate to severe pulmonary hypertension ([address] (8/20/2020) Acute diastolic CHF (congestive heart failure) ([address] (8/20/2020)

## 2020-08-23 NOTE — PROGRESS NOTES
08/23/20 1513 Oxygen Therapy O2 Sat (%) (!) 88 % Pulse via Oximetry 113 beats per minute O2 Device Heated; Hi flow nasal cannula O2 Flow Rate (L/min) 40 l/min O2 Temperature 87.8 °F (31 °C) FIO2 (%) 60 % (increased to 70%)

## 2020-08-23 NOTE — PROGRESS NOTES
Patient stated they do not like hospital BIPAP masks and made her face sore the next day. She stated she wanted to just wear airvo tonight. V60 was pulled from room. Patient is sleeping comfortably on airvo 40L/55%

## 2020-08-24 PROBLEM — J81.0 ACUTE PULMONARY EDEMA (HCC): Status: ACTIVE | Noted: 2020-01-01

## 2020-08-24 PROBLEM — R60.1 ANASARCA: Status: ACTIVE | Noted: 2020-01-01

## 2020-08-24 PROBLEM — J96.22 ACUTE ON CHRONIC RESPIRATORY FAILURE WITH HYPOXIA AND HYPERCAPNIA (HCC): Status: ACTIVE | Noted: 2020-01-01

## 2020-08-24 NOTE — PROGRESS NOTES
The flow and FiO2 had to be adjusted because the patient got out of bed and she began to have oxygen desaaturation. Will continue to monitor and wean.

## 2020-08-24 NOTE — PROGRESS NOTES
8/24/2020 10:09 AM 
 
Admit Date: 8/19/2020 Admit Diagnosis: Acute on chronic respiratory failure with hypoxia ([address] [J96.21] Subjective: More sob- no cp Objective:  
  
Visit Vitals BP 92/63 (BP 1 Location: Right arm, BP Patient Position: At rest) Pulse (!) 148 Temp 98.3 °F (36.8 °C) Resp 22 Ht 5' 2\" (1.575 m) Wt 282 lb 6.6 oz (128.1 kg) SpO2 93% BMI 51.65 kg/m² Physical Exam: 
[place], Well Nourished, No Acute Distress, Alert & Oriented x 3, appropriate mood. Neck- supple, no JVD 
CV- irregular rate, tachy and rhythm no MRG Lung- wheezing bilaterally Abd- soft, nontender, nondistended Ext- one plus edema bilaterally. Skin- warm and dry Data Review:  
Recent Labs  
  08/24/20 
0553 08/23/20 
5079  142  
K 4.3 4.2 BUN 12 12 CREA 0.78 0.90 WBC  --  7.2 HGB 8.5* 8.3* HCT  --  29.4* PLT  --  188 Assessment/Plan:  
 
Principal Problem: 
  Acute on chronic respiratory failure with hypoxia ([address] (8/19/2020)- worse- wheezing this am and HR increased- transfer stat to [facility] for cardizem gtt, iv lasix and breathing treatment Active Problems: 
  Bronchiectasis ([address] (5/5/2020) Hypertension (5/5/2020)Stable. Continue current medical therapy. Atrial fibrillation ([address] (6/20/2020) Suspected sleep apnea (6/20/2020) Obesity hypoventilation syndrome ([address] (6/23/2020) Moderate to severe pulmonary hypertension ([address] (8/20/2020) Acute diastolic CHF (congestive heart failure) (HCC) (8/20/2020)worse- as above

## 2020-08-24 NOTE — PROGRESS NOTES
Occupational Therapy Note: 
Therapist is discharging patient from OT at this time due to decline in medical status and transfer to CV-ICU. Please reconsult OT when MD deems patient appropriate for continued services. Thank you.  
Reginald Rosa, OTR/L

## 2020-08-24 NOTE — PROGRESS NOTES
Patient's Bipap paused momentarily. at first, the pt tolerated being off Bipap, but quickly it became evident that she would not tolerate being off of BiPAP. The patient took 3 sips of ice water, was appreciative but then became tachypneic with her respirations and her O2 saturation dropped into the 60's. Patient placed back on BIpap and Oxygen level recovered to 90%

## 2020-08-24 NOTE — PROGRESS NOTES
PICC Placement Note PRE-PROCEDURE VERIFICATION Correct Procedure: yes. Time out completed with assistant Levonia Mcardle, RN, VAT and all persons present in agreement with time out. Correct Site:  yes Temperature: Temp: 98.3 °F (36.8 °C), Temperature Source: Temp Source: Temporal 
Recent Labs  
  08/24/20 
0553 08/23/20 
8723 BUN 12 12 CREA 0.78 0.90 PLT  --  188 WBC  --  7.2 Allergies: Pseudoephedrine Education materials for [facility] given to patient or family. PROCEDURE DETAIL A triple lumen PICC line was started for vascular access, antibiotic therapy and desire for reliable access. The following documentation is in addition to the PICC properties in the lines/airways flowsheet : 
Lot #: [id number] 
xylocaine used: yes Mid-Arm Circumference: 39 (cm) Internal Catheter Length: 45 (cm) Internal Catheter Total Length: 45 (cm) Vein Selection for PICC:left cephalic Central Line Bundle followed yes Complication Related to Insertion: none Both the insertion guidewire and ECG guidewire were removed intact all ports have positive blood return and were flush well with normal saline. The location of the tip of the PICC is verified using ECG technology. The tip is in the SVC per ECG reading. See image below. Line is okay to use: yes Court Winters RN, VAT

## 2020-08-24 NOTE — PROGRESS NOTES
Physical Therapy Note: Attempted to see patient this AM for physical therapy treatment session. Patient currently working with respiratory therapy secondary to SOB. Will follow and re-attempt as schedule permits/patient available. Thank you, Kylee Swann, PT, DPT

## 2020-08-24 NOTE — PROGRESS NOTES
Critical Care Daily Progress Note: 8/24/2020 Ralph Gilliam Admission Date: 8/19/2020 The patient's chart is reviewed and the patient is discussed with the staff. 70 y.o.  female seen and evaluated at the request of Dr. Adria Estevez was admitted with hypoxia (sat of 60% on 4 liters oxygen). She was initially on bipap but has been weaned to 60% Samantha Siddiqi describes a gradual increase in her dyspnea and lack of mobility due to shortness of breath, dating back to December.  She was [facility] in May and June with volume overload. Her echo in June reported a normal EF but her RV was dilated and her RVSP measured 75 to 80 mmHg.  She is maintained on 4 liters of oxygen. She is morbidly obese and a sleep study has been ordered but she states that the office has not contacted her yet. Dania Parham reports daytime fatigue and somnolence.   
  She was seen in our office on August 6 and she complained of edema and dyspnea then. Her lasix was increased from 40 mg per day to BID. She was still on this dose at the time of this admission. She does not weigh herself nor limit her fluid intake and she feels very edematous now.  Her BNP was 5211 on admission and she was hypertensive. She is currently on 40 mg lasix IV per day and her fluid balance is neg 1.1 liters.   
   She has a history of bronchiectasis which has been stable. Her PFTs this month showed pure restriction.   
    
Subjective:  
 
Overall worse and moved to ICU today and started on BIPAP Has profound Anasarca Follow command Current Facility-Administered Medications Medication Dose Route Frequency  dilTIAZem ER (CARDIZEM CD) capsule 180 mg  180 mg Oral DAILY  sodium chloride 0.9 % bolus infusion 100 mL  100 mL IntraVENous RAD ONCE  
 saline peripheral flush soln 10 mL  10 mL InterCATHeter RAD ONCE  
 iopamidoL (ISOVUE-370) 76 % injection 100 mL  100 mL IntraVENous RAD ONCE  
  dilTIAZem (CARDIZEM) 100 mg in 0.9% sodium chloride (MBP/ADV) 100 mL infusion  0-15 mg/hr IntraVENous TITRATE  metoprolol tartrate (LOPRESSOR) tablet 25 mg  25 mg Oral Q6H  
 furosemide (LASIX) 100 mg in 0.9% sodium chloride 100 mL infusion  10 mg/hr IntraVENous CONTINUOUS  
 vancomycin (VANCOCIN) 1250 mg in  ml infusion  1,250 mg IntraVENous Q12H  digoxin (LANOXIN) tablet 0.125 mg  0.125 mg Oral DAILY  sodium chloride (NS) flush 5-40 mL  5-40 mL IntraVENous Q8H  
 sodium chloride (NS) flush 5-40 mL  5-40 mL IntraVENous PRN  
 acetaminophen (TYLENOL) tablet 650 mg  650 mg Oral Q6H PRN Or  
 acetaminophen (TYLENOL) suppository 650 mg  650 mg Rectal Q6H PRN  polyethylene glycol (MIRALAX) packet 17 g  17 g Oral DAILY PRN  promethazine (PHENERGAN) tablet 12.5 mg  12.5 mg Oral Q6H PRN Or  
 ondansetron (ZOFRAN) injection 4 mg  4 mg IntraVENous Q6H PRN  
 acetaminophen (TYLENOL) tablet 650 mg  650 mg Oral Q6H PRN  
 albuterol (PROVENTIL VENTOLIN) nebulizer solution 2.5 mg  2.5 mg Nebulization Q4H PRN  
 aspirin chewable tablet 81 mg  81 mg Oral DAILY  dabigatran etexilate (PRADAXA) capsule 150 mg  150 mg Oral Q12H  furosemide (LASIX) injection 40 mg  40 mg IntraVENous BID  potassium chloride (K-DUR, KLOR-CON) SR tablet 40 mEq  40 mEq Oral BID  nystatin (MYCOSTATIN) 100,000 unit/gram powder   Topical BID Review of Systems Unobtainable due to patient status. Objective:  
 
Vitals:  
 08/24/20 1215 08/24/20 1230 08/24/20 1245 08/24/20 1300 BP: 146/71 136/68 151/76 150/74 Pulse: (!) 132 (!) 118 (!) 119 (!) 114 Resp: (!) 46 (!) 44 (!) 37 (!) 39 Temp:      
SpO2: 91% 93% 93% (!) 88% Weight:      
Height:      
 
 
 
Intake/Output Summary (Last 24 hours) at 8/24/2020 1333 Last data filed at 8/24/2020 1148 Gross per 24 hour Intake 120 ml Output 2675 ml Net -2555 ml Physical Exam: Constitutional:  the patient is well developed and on BIPAP 
EENMT:  Sclera clear, pupils equal, oral mucosa moist 
Respiratory: crackles in the bases Cardiovascular:  RRR without M,G,R 
Gastrointestinal: soft and non-tender; with positive bowel sounds. Musculoskeletal: warm without cyanosis. There is 4+ lower extremity edema. Skin:  no jaundice or rashes, no wounds Neurologic: no gross neuro deficits Psychiatric:  alert and oriented x 3 LINES: 
elam DRIPS: 
Cardizem 15 mg/hr CXR:  
 
 
 
Clinical History: The Female patient is 78years old  presenting with symptoms 
of worsening hypoxia. 
  
Comparison:  Chest x-ray 8/20/2020 
  
Findings:  Frontal view of the chest was obtained.  
  
There is worsening diffuse pulmonary edema. Increasing basilar pleural effusions 
are suspected layering posteriorly. The cardiomediastinal silhouette remains 
enlarged. There are no acute osseous abnormalities. 
  
IMPRESSION Impression:   
  
Worsening CHF/volume overload. LAB No results for input(s): GLUCPOC in the last 72 hours. No lab exists for component: [place] Recent Labs  
  08/24/20 
0553 08/23/20 
2561 08/22/20 
0550 WBC  --  7.2 6.6 HGB 8.5* 8.3* 8.3* HCT  --  29.4* 28.2*  
PLT  --  188 183 Recent Labs  
  08/24/20 
0553 08/23/20 
1391 08/22/20 
0550  142 143  
K 4.3 4.2 3.8 CL 97* 97* 98  
CO2 40* 44* 40* * 94 98 BUN 12 12 14 CREA 0.78 0.90 0.89  
MG 2.4 2.3 2.2 CA 8.4 8.5 8.1* No results for input(s): PH, PCO2, PO2, HCO3, PHI, PCO2I, PO2I, HCO3I in the last 72 hours. No results for input(s): LCAD, LAC in the last 72 hours. No results for input(s): PH, PCO2, PO2, HCO3, PHI, PCO2I, PO2I, HCO3I in the last 72 hours. Patient Active Problem List  
Diagnosis Code  Bron[facility] ([facility] J47.9  Hypertension I10  Atrial fibrillation ([facility]) I48.91  
 Chronic respiratory failure with hypoxia ([facility]) J96.11  
 Suspected sleep apnea R29.818  
  Obesity hypoventilation syndrome (HCC) E66.2  Acute on chronic respiratory failure with hypoxia and hypercapnia (HCC) J96.21, J96.22  
 Moderate to severe pulmonary hypertension (HCC) I27.20  Acute diastolic CHF (congestive heart failure) (HCC) I50.31  
 Anasarca R60.1  Acute pulmonary edema (HCC) J81.0 Assessment:  (Medical Decision Making) Hospital Problems  Date Reviewed: 8/21/2020 Codes Class Noted POA Anasarca ICD-10-CM: R60.1 ICD-9-CM: 782.3  8/24/2020 Yes Acute pulmonary edema (HCC) ICD-10-CM: J81.0 ICD-9-CM: 518.4  8/24/2020 Yes Moderate to severe pulmonary hypertension (HCC) ICD-10-CM: I27.20 ICD-9-CM: 416.8  8/20/2020 Yes Acute diastolic CHF (congestive heart failure) (HCC) ICD-10-CM: I50.31 ICD-9-CM: 428.31, 428.0  8/20/2020 Yes * (Principal) Acute on chronic respiratory failure with hypoxia and hypercapnia (HCC) ICD-10-CM: J96.21, J96.22 
ICD-9-CM: 518.84, 786.09, 799.02  8/19/2020 Yes Obesity hypoventilation syndrome (HCC) (Chronic) ICD-10-CM: T57.5 ICD-9-CM: 278.03  6/23/2020 Yes Atrial fibrillation (HCC) (Chronic) ICD-10-CM: I48.91 
ICD-9-CM: 427.31  6/20/2020 Yes Suspected sleep apnea (Chronic) ICD-10-CM: K76.993 ICD-9-CM: 781.99  6/20/2020 Yes Bronchiectasis ([facility] (Chronic) ICD-10-CM: J47.9 ICD-9-CM: 494.0  5/5/2020 Yes Hypertension (Chronic) ICD-10-CM: I10 
ICD-9-CM: 401.9  5/5/2020 Yes Plan:  (Medical Decision Making) --adjust NIV to improve oxygenation 
--agree with Lasix drip 
--PICC 
--follow CXR and ABG 
--cont. Pradaxa 
--add Pepcid --per card 
--will need PSG later to treat JEANNE The patient is critically ill with respiratory failure, circulatory failure and requires high complexity decision making for assessment and support including frequent ventilator adjustment , frequent evaluation and titration of therapies , application of advanced monitoring technologies and extensive interpretation of multiple databases Time devoted to patient care services described in this note- 15 min face to face/ 20 min total evaluation time Cumulative time devoted to patient care services by me for day of service -35 min More than 50% of the time documented was spent in face-to-face contact with the patient and in the care of the patient on the floor/unit where the patient is located.  
 
Celine Davis MD

## 2020-08-24 NOTE — PROGRESS NOTES
TRANSFER - IN REPORT: 
 
Verbal report received from 7th floor RN(name) on Blake Mendez  being received from 7th floor(unit) for urgent transfer Report consisted of patients Situation, Background, Assessment and  
Recommendations(SBAR). Information from the following report(s) SBAR, Kardex, ED Summary, Intake/Output, MAR, Recent Results, Med Rec Status and Cardiac Rhythm a fib was reviewed with the receiving nurse. Opportunity for questions and clarification was provided. Assessment completed upon patients arrival to unit and care assumed. \[id number]\

## 2020-08-24 NOTE — PROGRESS NOTES
's follow-up visit attempted at room 725. Patent was not found in room. According to the medical chart Ms. Boswell is now in 107. Chaplains remain available for support. Blossom Sierra MDiv Board Certified [address] [place] [address]

## 2020-08-24 NOTE — PROGRESS NOTES
Hospitalist Progress Note 2020 Admit Date: 2020  4:17 PM  
NAME: Alexander Boswell :  1950 MRN:  [id number] Attending: Paul Muir MD 
PCP:  Cheryl Mancuso MD 
 
SUBJECTIVE:  
 
77 yo CF with past history of bronchiectasis, JEANNE complicated by pulmonary hypertension with chronic hypoxic respiratory failure (on 4L oxygen at home), Afib (on Pradaxa) presents with worsening shortness of breath. Denies associated fevers/chills, chest pain, worsening productive cough, abdominal pain, nausea/vomiting or diarrhea. Patient was outside her PCP office with SpO2 of 60% on her home 4L. She was transported via EMS and placed on BiPAP. At bedside, patient says she has not missed any doses of her medications \"except for my potassium. \" In ED, pt weaned to [address] [place], FiO2 70%, CXR with bibasilar airspace opacities. She was started on Lasix IV, pulm and cards consulted. Walked into the room to find pt tachypneic, unable to speak in complete sentences, HR 180s. on AirVo 100% with SaO2 88-90%. Pt placed on BIPAP with improved WOB. Review of Systems negative with exception of pertinent positives noted above PHYSICAL EXAM  
 
Patient Vitals for the past 24 hrs: 
 Temp Pulse Resp BP SpO2  
20 0926     93 % 20 0739 98.3 °F (36.8 °C) (!) 148 22 92/63 90 % 20 0420 98.3 °F (36.8 °C) (!) 115 26 136/74 90 % 20 2355 98.4 °F (36.9 °C) 95 24 133/75 94 % 20 1936 98.8 °F (37.1 °C) (!) 121 17 127/78 93 % 20 1516     92 % 20 1513     (!) 88 % 20 1447 98.5 °F (36.9 °C) (!) 112 20 132/82 90 % 20 1209     92 % 20 1200 98.2 °F (36.8 °C) (!) 108 20 105/68 92 % Oxygen Therapy O2 Sat (%): 93 % (20) Pulse via Oximetry: (!) 176 beats per minute (20) O2 Device: Heated; Hi flow nasal cannula (20) O2 Flow Rate (L/min): 60 l/min (20) O2 Temperature: 87.8 °F (31 °C) (08/24/20 0926) FIO2 (%): 90 % (08/24/20 0926) Intake/Output Summary (Last 24 hours) at 8/24/2020 1025 Last data filed at 8/24/2020 2270 Gross per 24 hour Intake 120 ml Output 2875 ml Net -2755 ml General: Moderate distress Lungs:  Decreased BS with wheezing BL, unable to speak in complete sentences, tachypneic Heart:  Irregularly irregular, rates 180s Abdomen: Soft, Non distended, Non tender, Positive bowel sounds Extremities: Significant anasarca, L AC with area of induration with minimal purulence at site of previous IV, no fluctuance, unable to express any additional fluid Neurologic:  No focal deficits Psych:  Calm, cooperative Recent Results (from the past 24 hour(s)) MAGNESIUM Collection Time: 08/24/20  5:53 AM  
Result Value Ref Range Magnesium 2.4 1.8 - 2.4 mg/dL METABOLIC PANEL, BASIC Collection Time: 08/24/20  5:53 AM  
Result Value Ref Range Sodium 140 136 - 145 mmol/L Potassium 4.3 3.5 - 5.1 mmol/L Chloride 97 (L) 98 - 107 mmol/L  
 CO2 40 (H) 21 - 32 mmol/L Anion gap 3 (L) 7 - 16 mmol/L Glucose 123 (H) 65 - 100 mg/dL BUN 12 8 - 23 MG/DL Creatinine 0.78 0.6 - 1.0 MG/DL  
 GFR est AA >60 >60 ml/min/1.73m2 GFR est non-AA >60 >60 ml/min/1.73m2 Calcium 8.4 8.3 - 10.4 MG/DL  
HEMOGLOBIN Collection Time: 08/24/20  5:53 AM  
Result Value Ref Range HGB 8.5 (L) 11.7 - 15.4 g/dL Micro: All Micro Results Procedure Component Value Units Date/Time CULTURE, BLOOD [[id number]] Collected:  08/19/20 1640 Order Status:  Completed Specimen:  Blood Updated:  08/23/20 9847 Special Requests: --     
  RIGHT Antecubital 
  
  Culture result: NO GROWTH 4 DAYS     
 CULTURE, BLOOD [[id number]]  (Abnormal) Collected:  08/19/20 1629 Order Status:  Completed Specimen:  Blood Updated:  08/22/20 0703   Special Requests: --     
  LEFT 
ARM 
  
  GRAM STAIN GRAM POSITIVE RODS     
 AEROBIC BOTTLE POSITIVE RESULTS VERIFIED, PHONED TO AND READ BACK BY Marleni Newton RN @5710 8/20/20 SC Culture result: BACILLUS MEGATERIUM     
      
  THIS ORGANISM MAY BE INDICATIVE OF CULTURE CONTAMINATION, HOWEVER, CLINICAL CORRELATION NEEDS TO BE EVALUATED, AS EACH CASE IS UNIQUE. Other studies last 24 hours: 
No results found. All imaging, labs reviewed. ASSESSMENT Hospital Problems as of 8/24/2020 Date Reviewed: 8/21/2020 Codes Class Noted - Resolved POA Moderate to severe pulmonary hypertension (HCC) ICD-10-CM: I27.20 ICD-9-CM: 416.8  8/20/2020 - Present Yes Acute diastolic CHF (congestive heart failure) (HCC) ICD-10-CM: I50.31 ICD-9-CM: 428.31, 428.0  8/20/2020 - Present Yes * (Principal) Acute on chronic respiratory failure with hypoxia [facility]) ICD-10-CM: J96.21 
ICD-9-CM: 518.84, 799.02  8/19/2020 - Present Yes Obesity hypoventilation syndrome (HCC) (Chronic) ICD-10-CM: Z53.2 ICD-9-CM: 278.03  6/23/2020 - Present Yes Atrial fibrillation (HCC) (Chronic) ICD-10-CM: I48.91 
ICD-9-CM: 427.31  6/20/2020 - Present Yes Suspected sleep apnea (Chronic) ICD-10-CM: S19.434 ICD-9-CM: 781.99  6/20/2020 - Present Yes Bronchiectasis ([address] (Chronic) ICD-10-CM: J47.9 ICD-9-CM: 494.0  5/5/2020 - Present Yes Hypertension (Chronic) ICD-10-CM: I10 
ICD-9-CM: 401.9  5/5/2020 - Present Yes Acute on chronic respiratory failure with hypoxia, on 4L oxygen at home Acute diastolic heart failure JEANNE - BIPAP 
- moving to CVICU 
- stat CXR with worsening pulm edema 
- ECHO 6/20 with preserved EF 
- starting Lasix gtt - pulm and cards following - both aware of decompensation - CTA chest without PE, shows BL pulm edema, small-mod BL pleural effusions, severe anasarca, small to moderate ascites 
  
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 L renal subcapsular hematoma, moderate Incidentally seen on CT. Unclear cause. 
- discussed with renal - recommended repeat imaging in 2 days, follow hgb. - hgb stable 
 - repeat CT planned for today but may need to postpone until stabilized  from resp standpoint Afib with RVR 
- continue diltiazem, metoprolol, Pradaxa - cards following 
- starting cardizem gtt 
  
L AC infection at site of previous IV 
- start Vanc 
- check MRSA swab - if negative, can adjust abx tomorrow RAMYA, resolved 
  
HTN 
- losartan d/c'd due to soft BP 
  
35 minutes critical care time spent Proph - Pradaxa Dispo - too early to tell 
  
 
 
Signed By: Moe Carney MD   
 August 24, 2020

## 2020-08-24 NOTE — PROGRESS NOTES
Pharmacokinetic Consult to Pharmacist 
 
Denise Brandon is a 78 y.o. female being treated for SSTI with vancomycin. Height: 5' 2\" (157.5 cm)  Weight: 128.1 kg (282 lb 6.6 oz) Lab Results Component Value Date/Time BUN 12 08/24/2020 05:53 AM  
 Creatinine 0.78 08/24/2020 05:53 AM  
 WBC 7.2 08/23/2020 06:14 AM  
 Procalcitonin 0.06 08/19/2020 04:29 PM  
 Lactic acid 1.5 08/19/2020 07:21 PM  
  
Estimated Creatinine Clearance: 88.6 mL/min (based on SCr of 0.78 mg/dL). No results found for: Pedro Padilla Day 1 of vancomycin. Goal trough is 10-20 mcg/ml. I will initiate Vancomycin 1250 mg (10mg/kg) Q 12 hrs. Pharmacist will continue to follow patient and order levels when clinically indicated. Please call with any questions. Thank you, Shukri Webster Clinical Pharmacist 
[phone]

## 2020-08-25 NOTE — PROGRESS NOTES
[facility], [place] CARDIOLOGY PROGRESS NOTE 
      
 
8/25/2020 7:09 AM 
 
Admit Date: 8/19/2020 Subjective:  
Patient remains on bipap. 70% FiO2. Net 3L diuresis yesterday. ROS: 
Cardiovascular:  As noted above Objective:  
  
Vitals:  
 08/25/20 0401 08/25/20 0500 08/25/20 0526 08/25/20 0600 BP: 134/61 119/60  125/60 Pulse: 80 81  85 Resp: (!) 31 30  (!) 34 Temp:      
SpO2: 92% 95%  96% Weight:   272 lb 11.3 oz (123.7 kg) Height:   5' 2\" (1.575 m) Physical Exam: 
General-No Acute Distress Neck- supple, no JVD 
CV- irregular rate and rhythm no MRG Lung- wheeze bilaterally Abd- soft, nontender, nondistended Ext- 2+ edema bilaterally. Skin- warm and dry Data Review:  
Recent Labs  
  08/25/20 
0503 08/24/20 
4258 08/23/20 
9501  140 142  
K 3.9 4.3 4.2 MG 2.2 2.4 2.3 BUN 14 12 12 CREA 0.80 0.78 0.90 GLU 94 123* 94 WBC  --   --  7.2 HGB 7.2* 8.5* 8.3* HCT  --   --  29.4* PLT  --   --  188 Assessment/Plan:  
 
Principal Problem: 
  Acute on chronic respiratory failure with hypoxia and hypercapnia ([address] (8/19/2020) This is complicated with massive obesity, anasarca, pulmonary edema in the face of severe PHTN. Prognosis is poor. She has been admitted almost every month for the last 3-4 months. IV lasix gtt again today. Wean off of bipap as she tolerates. Active Problems: 
  Bronchiectasis ([address] (5/5/2020) Managed per Pulmonary Hypertension (5/5/2020) Well controlled today Atrial fibrillation ([address] (6/20/2020) Rate better controlled. Increase cardizem and metoprolol and wean off of cardizem gtt. Remain on Pradaxa. Suspected sleep apnea (6/20/2020) CPAP/Bipap while sleeping Obesity hypoventilation syndrome ([address] (6/23/2020) CPAP/Bipap while sleeping Moderate to severe pulmonary hypertension ([facility] (8/20/2020) This is chronic and prognosis is poor Acute diastolic CHF (congestive heart failure) ([facility] (8/20/2020) IV lasix as above Anasarca (8/24/2020) IV lasix as above Acute pulmonary edema ([facility] (8/24/2020) IV lasix as above Marc Saavedra MD 
8/25/2020 7:09 AM

## 2020-08-25 NOTE — PROGRESS NOTES
Bedside shift report received from José Luis Dillon RN (offgoing nurse). Report given to Skylar Maier RN. Vital signs stable. No complaints present. Patient in stable condition.

## 2020-08-25 NOTE — PROGRESS NOTES
08/25/20 4150 Oxygen Therapy O2 Sat (%) 93 % Pulse via Oximetry 82 beats per minute O2 Device BIPAP  
O2 Temperature 87.8 °F (31 °C) FIO2 (%) 70 % Respiratory Respiratory (WDL) X Respiratory Pattern Tachypneic Chest/Tracheal Assessment Chest expansion, symmetrical  
Breath Sounds Bilateral Diminished Breath Sounds Left Diminished Breath Sounds Right Diminished CPAP/BIPAP Device Mode AVAP AVAP Set Resp Rate 132 AVAP Set VT (ml) 450 Mask Type and Size Medium Skin Condition intact PIP Observed 22 cm H20 Vt Spont (ml) 476 ml Ve Observed (l/min) 14.4 l/min Backup Rate 12 Total RR (Spontaneous) 28 breaths per minute Insp Rise Time (sec) 3 Leak (Estimated) 18 L/min  
Pt's Home Machine No  
Biomedical Check Performed Yes Settings Verified Yes Alarm Settings High Pressure 30 Low Pressure 5 Low Ve 2 High Rate 50 Low Rate 5

## 2020-08-25 NOTE — PROGRESS NOTES
Critical Care Daily Progress Note: 8/25/2020 Ralph Gilliam Admission Date: 8/19/2020 The patient's chart is reviewed and the patient is discussed with the staff. 70 y.o.  female seen and evaluated at the request of Dr. Krissy Barrios was admitted with hypoxia (sat of 60% on 4 liters oxygen). She was initially on bipap but has been weaned to 60% Jhonny Cabot describes a gradual increase in her dyspnea and lack of mobility due to shortness of breath, dating back to December.  She was [facility] in May and June with volume overload. Her echo in June reported a normal EF but her RV was dilated and her RVSP measured 75 to 80 mmHg.  She is maintained on 4 liters of oxygen. She is morbidly obese and a sleep study has been ordered but she states that the office has not contacted her yet. Nancy Nicholson reports daytime fatigue and somnolence.   
  She was seen in our office on August 6 and she complained of edema and dyspnea then. Her lasix was increased from 40 mg per day to BID. She was still on this dose at the time of this admission. She does not weigh herself nor limit her fluid intake and she feels very edematous now.  Her BNP was 5211 on admission and she was hypertensive. She is currently on 40 mg lasix IV per day and her fluid balance is neg 1.1 liters.   
   She has a history of bronchiectasis which has been stable. Her PFTs this month showed pure restriction Subjective:  
Moved to icu yesterday on 8/24 and has been on bipap-yesterday on floor hr 180s and pt with tachypnea 3725 out yesterday-lasix drip Current Facility-Administered Medications Medication Dose Route Frequency  dilTIAZem ER (CARDIZEM CD) capsule 180 mg  180 mg Oral DAILY  dilTIAZem (CARDIZEM) 100 mg in 0.9% sodium chloride (MBP/ADV) 100 mL infusion  0-15 mg/hr IntraVENous TITRATE  metoprolol tartrate (LOPRESSOR) tablet 25 mg  25 mg Oral Q6H  
  furosemide (LASIX) 100 mg in 0.9% sodium chloride 100 mL infusion  10 mg/hr IntraVENous CONTINUOUS  
 vancomycin (VANCOCIN) 1250 mg in  ml infusion  1,250 mg IntraVENous Q12H  central line flush (saline) syringe 10 mL  10 mL InterCATHeter Q8H  
 digoxin (LANOXIN) tablet 0.125 mg  0.125 mg Oral DAILY  sodium chloride (NS) flush 5-40 mL  5-40 mL IntraVENous Q8H  
 sodium chloride (NS) flush 5-40 mL  5-40 mL IntraVENous PRN  
 acetaminophen (TYLENOL) tablet 650 mg  650 mg Oral Q6H PRN Or  
 acetaminophen (TYLENOL) suppository 650 mg  650 mg Rectal Q6H PRN  polyethylene glycol (MIRALAX) packet 17 g  17 g Oral DAILY PRN  promethazine (PHENERGAN) tablet 12.5 mg  12.5 mg Oral Q6H PRN Or  
 ondansetron (ZOFRAN) injection 4 mg  4 mg IntraVENous Q6H PRN  
 acetaminophen (TYLENOL) tablet 650 mg  650 mg Oral Q6H PRN  
 albuterol (PROVENTIL VENTOLIN) nebulizer solution 2.5 mg  2.5 mg Nebulization Q4H PRN  
 aspirin chewable tablet 81 mg  81 mg Oral DAILY  dabigatran etexilate (PRADAXA) capsule 150 mg  150 mg Oral Q12H  nystatin (MYCOSTATIN) 100,000 unit/gram powder   Topical BID Review of Systems Constitutional:  negative for fever, chills, sweats Cardiovascular:  negative for chest pain, palpitations, syncope, edema Gastrointestinal:  negative for dysphagia, reflux, vomiting, diarrhea, abdominal pain, or melena Neurologic:  negative for focal weakness, numbness, headache Objective:  
 
Vitals:  
 08/25/20 0310 08/25/20 0401 08/25/20 0500 08/25/20 1458 BP:  134/61 119/60 Pulse:  80 81 Resp:  (!) 31 30 Temp:      
SpO2: 92% 92% 95% Weight:    272 lb 11.3 oz (123.7 kg) Height:    5' 2\" (1.575 m) Intake/Output Summary (Last 24 hours) at 8/25/2020 3212 Last data filed at 8/24/2020 2301 Gross per 24 hour Intake 326 ml Output 3725 ml Net -3399 ml Physical Exam: Constitutional:  the patient is well developed and in no acute distress EENMT:  Sclera clear, pupils equal, oral mucosa moist 
Respiratory: clear Cardiovascular:  RRR without M,G,R 
Gastrointestinal: soft and non-tender; with positive bowel sounds. Musculoskeletal: warm without cyanosis. There is 2+ lower extremity edema. Skin:  no jaundice or rashes, no wounds Neurologic: no gross neuro deficits Psychiatric:  alert and responsive LINES: 
peripheral 
 
DRIPS: 
lasix CXR:  8/24- pulmonary edema LAB No results for input(s): GLUCPOC in the last 72 hours. No lab exists for component: [place] Recent Labs  
  08/25/20 
0503 08/24/20 
8426 08/23/20 
1262 WBC  --   --  7.2 HGB 7.2* 8.5* 8.3* HCT  --   --  29.4* PLT  --   --  188 Recent Labs  
  08/25/20 
0503 08/24/20 
8949 08/23/20 
4494  140 142  
K 3.9 4.3 4.2 CL 95* 97* 97* CO2 41* 40* 44* GLU 94 123* 94 BUN 14 12 12 CREA 0.80 0.78 0.90  
MG 2.2 2.4 2.3 CA 7.9* 8.4 8.5 No results for input(s): PH, PCO2, PO2, HCO3, PHI, PCO2I, PO2I, HCO3I in the last 72 hours. No results for input(s): LCAD, LAC in the last 72 hours. No results for input(s): PH, PCO2, PO2, HCO3, PHI, PCO2I, PO2I, HCO3I in the last 72 hours. Patient Active Problem List  
Diagnosis Code  Bronchiectasis ([facility] J47.9  Hypertension I10  Atrial fibrillation ([facility]) I48.91  
 Chronic respiratory failure with hypoxia ([facility]) J96.11  
 Suspected sleep apnea R29.818  Obesity hypoventilation syndrome ([facility]) E66.2  Acute on chronic respiratory failure with hypoxia and hypercapnia ([facility]) J96.21, J96.22  
 Moderate to severe pulmonary hypertension ([facility]) I27.20  Acute diastolic CHF (congestive heart failure) ([facility]) I50.31  
 Anasarca R60.1  Acute pulmonary edema ([facility]) J81.0 Assessment:  (Medical Decision Making) Hospital Problems  Date Reviewed: 8/21/2020 Codes Class Noted POA [place] ICD-10-CM: R60.1 ICD-9-CM: 782.3  8/24/2020 Yes Good output Acute pulmonary edema (HCC) ICD-10-CM: J81.0 ICD-9-CM: 518.4  8/24/2020 Yes Moderate to severe pulmonary hypertension (HCC) ICD-10-CM: I27.20 ICD-9-CM: 416.8  8/20/2020 Yes Acute diastolic CHF (congestive heart failure) (HCC) ICD-10-CM: I50.31 ICD-9-CM: 428.31, 428.0  8/20/2020 Yes * (Principal) Acute on chronic respiratory failure with hypoxia and hypercapnia (HCC) ICD-10-CM: J96.21, J96.22 
ICD-9-CM: 518.84, 786.09, 799.02  8/19/2020 Yes On bipap Obesity hypoventilation syndrome (HCC) (Chronic) ICD-10-CM: K40.5 ICD-9-CM: 278.03  6/23/2020 Yes Atrial fibrillation (HCC) (Chronic) ICD-10-CM: I48.91 
ICD-9-CM: 427.31  6/20/2020 Yes Suspected sleep apnea (Chronic) ICD-10-CM: Q18.784 ICD-9-CM: 781.99  6/20/2020 Yes Bronchiectasis ([address] (Chronic) ICD-10-CM: J47.9 ICD-9-CM: 494.0  5/5/2020 Yes Hypertension (Chronic) ICD-10-CM: I10 
ICD-9-CM: 401.9  5/5/2020 Yes Plan:  (Medical Decision Making) 1   Check cxr 
2   Can do trial off bipap 3   Lasix drip 
-- 
 
More than 50% of the time documented was spent in face-to-face contact with the patient and in the care of the patient on the floor/unit where the patient is located.  
 
Bruno Varner MD

## 2020-08-25 NOTE — PROGRESS NOTES
CM continues to follow for discharge planning and/or CM needs. Prior to transfer to CVICU, CM arranged RW for pt discharge. At the time, pt had also declined need for home health. Will continue to monitor and update as needed. No complaints

## 2020-08-25 NOTE — PROGRESS NOTES
Bedside shift report given to Ganesh Ellis RN (oncoming nurse) by Pravin Billings RN (offgoing nurse). Bedside shift report included the following information: SBAR, Kardex, Procedure Summary, MAR, and Recent Results.

## 2020-08-25 NOTE — PROGRESS NOTES
PO meds given - SpO2 decreased to 80%. No s/s of aspiration. SpO2 increased to 90% when placed on BiPap.

## 2020-08-25 NOTE — PROGRESS NOTES
Pre-oxygenated for meds on 100% FiO2 BiPap. Meds given with no s/s of aspiration noted. Strong cough while off BiPap. SpO2 with decline to 70s. Placed back on BiPap. After SpO2 improved to 94%, oral care performed and Mepilex placed to nasal bridge. Poor toleration as SpO2 quickly declined to 70s and lips dusky. Pt alert and oriented. Placed back on BiPap. Brief clusters of oral care and meds while off BiPap as patient tolerates.

## 2020-08-25 NOTE — PROGRESS NOTES
Bedside shift report received from Courtney Martinez RN (offgoing nurse). Report given to Davis Barkley RN. Vital signs stable. No complaints present. Patient in stable condition.

## 2020-08-25 NOTE — PROGRESS NOTES
Hospitalist Progress Note 2020 Admit Date: 2020  4:17 PM  
NAME: Bri Boswell :  1950 MRN:  [id number] Attending: Donna Dow MD 
PCP:  Carmelita López MD 
 
SUBJECTIVE:  
 
79 yo CF with past history of bronchiectasis, JEANNE complicated by pulmonary hypertension with chronic hypoxic respiratory failure (on 4L oxygen at home), Afib (on Pradaxa) presents with worsening shortness of breath. Denies associated fevers/chills, chest pain, worsening productive cough, abdominal pain, nausea/vomiting or diarrhea. Patient was outside her PCP office with SpO2 of 60% on her home 4L. She was transported via EMS and placed on BiPAP. At bedside, patient says she has not missed any doses of her medications \"except for my potassium. \" In ED, pt weaned to [address] [place], FiO2 70%, CXR with bibasilar airspace opacities. She was started on Lasix IV, pulm and cards consulted. Today: Patient remains on BiPAP and furosemide GTT. Albumin low, added albumin every 6 hours x4 doses. Review of Systems negative with exception of pertinent positives noted above PHYSICAL EXAM  
 
Patient Vitals for the past 24 hrs: 
 Temp Pulse Resp BP SpO2  
20 1401  72 18 143/66 94 % 20 1400  79 14  95 % 20 1345  68 29  94 % 20 1330  78 (!) 32  94 % 20 1315  77 23  95 % 20 1301  75 (!) 37 149/68 94 % 20 1300  78 (!) 31  94 % 20 1245  76 25  94 % 20 1230  80 24  94 % 20 1215  88 29  94 % 20 1200  93 18 164/74 93 % 20 1145  94 20  91 % 20 1130  89 27  95 % 20 1115  90 27  93 % 20 1100 97.9 °F (36.6 °C) 99 17 165/82 93 % 20 1045  88 28  93 % 20 1030  90 23  91 % 20 1015  86 18  95 % 20 1000  89 22 164/75 95 % 20 0945  85 24  93 % 20 0933  (!) 103 27  94 % 20 0932  100 27  (!) 84 % 08/25/20 0931  95 (!) 70  (!) 77 % 08/25/20 0930  91 20  94 % 08/25/20 0915  97 27  94 % 08/25/20 0900  94 26 154/67 94 % 08/25/20 0845  89 (!) 34  95 % 08/25/20 0830  86 30  94 % 08/25/20 0815  98 (!) 32  95 % 08/25/20 0814     93 % 08/25/20 0800  88 25 150/67 96 % 08/25/20 0745  85 28  95 % 08/25/20 0730  85 26  96 % 08/25/20 0715  87 (!) 35  94 % 08/25/20 0700 97.6 °F (36.4 °C) 86 (!) 32 133/66 94 % 08/25/20 0600  85 (!) 34 125/60 96 % 08/25/20 0500  81 30 119/60 95 % 08/25/20 0401  80 (!) 31 134/61 92 % 08/25/20 0310     92 % 08/25/20 0301 97.6 °F (36.4 °C) 98 23 155/63 92 % 08/25/20 0201  (!) 106 (!) 40 166/72 91 % 08/25/20 0101  (!) 105 (!) 39 157/65 92 % 08/25/20 0001  (!) 108 20 156/68 97 % 08/24/20 2301 97.4 °F (36.3 °C) 93 25 152/67 95 % 08/24/20 2201  89 (!) 38 153/65 92 % 08/24/20 2101  (!) 101 (!) 39 157/71 95 % 08/24/20 2001  95 (!) 36 156/67 97 % 08/24/20 1911     95 % 08/24/20 1901 97 °F (36.1 °C) 96 (!) 37 153/72 94 % 08/24/20 1848  97 (!) 41  94 % 08/24/20 1847  92 (!) 35  96 % 08/24/20 1846  98 (!) 37  95 % 08/24/20 1845  96 (!) 32  (!) 84 % 08/24/20 1844  (!) 122 (!) 33  (!) 62 %  
08/24/20 1800  (!) 106 (!) 45 157/70 93 % 08/24/20 1734  100 (!) 42 144/67 91 % 08/24/20 1615  (!) 103 (!) 38 126/66 94 % 08/24/20 1600  98 (!) 35 127/63 94 % 08/24/20 1545  98 (!) 38 137/65 97 % 08/24/20 1530  94 (!) 35 133/62 100 % 08/24/20 1515  (!) 103 (!) 51 136/65 91 % 08/24/20 1500 98.3 °F (36.8 °C) (!) 114 (!) 31 144/66 91 % Oxygen Therapy O2 Sat (%): 94 % (08/25/20 1401) Pulse via Oximetry: 72 beats per minute (08/25/20 1401) O2 Device: BIPAP (08/25/20 3447) O2 Flow Rate (L/min): 60 l/min (08/24/20 0926) O2 Temperature: 87.8 °F (31 °C) (08/25/20 0814) FIO2 (%): 55 % (08/25/20 1142) Intake/Output Summary (Last 24 hours) at 8/25/2020 7339 Last data filed at 8/25/2020 1057 Gross per 24 hour Intake 1254.75 ml Output 3525 ml Net -2270.25 ml General: Moderate distress Lungs: Moderately labored respirations on BiPAP, decreased BS with wheezing BL, unable to speak in complete sentences, tachypneic Heart:  Irregularly irregular, rates 180s Abdomen: Soft, Non distended, Non tender, Positive bowel sounds Extremities: Significant anasarca, L AC with area of induration with minimal purulence at site of previous IV, no fluctuance, unable to express any additional fluid Neurologic:  No focal deficits Psych:  Calm, cooperative Recent Results (from the past 24 hour(s)) MSSA/MRSA SC BY PCR, NASAL SWAB Collection Time: 08/25/20  5:03 AM  
 Specimen: Nasal swab Result Value Ref Range Special Requests: NO SPECIAL REQUESTS Culture result:     
  SA target not detected. A MRSA NEGATIVE, SA NEGATIVE test result does not preclude MRSA or SA nasal colonization. MAGNESIUM Collection Time: 08/25/20  5:03 AM  
Result Value Ref Range Magnesium 2.2 1.8 - 2.4 mg/dL METABOLIC PANEL, BASIC Collection Time: 08/25/20  5:03 AM  
Result Value Ref Range Sodium 141 136 - 145 mmol/L Potassium 3.9 3.5 - 5.1 mmol/L Chloride 95 (L) 98 - 107 mmol/L  
 CO2 41 (HH) 21 - 32 mmol/L Anion gap 5 (L) 7 - 16 mmol/L Glucose 94 65 - 100 mg/dL BUN 14 8 - 23 MG/DL Creatinine 0.80 0.6 - 1.0 MG/DL  
 GFR est AA >60 >60 ml/min/1.73m2 GFR est non-AA >60 >60 ml/min/1.73m2 Calcium 7.9 (L) 8.3 - 10.4 MG/DL  
HEPATIC FUNCTION PANEL Collection Time: 08/25/20  5:03 AM  
Result Value Ref Range Protein, total 5.6 (L) 6.3 - 8.2 g/dL Albumin 2.2 (L) 3.2 - 4.6 g/dL Globulin 3.4 2.3 - 3.5 g/dL A-G Ratio 0.6 (L) 1.2 - 3.5 Bilirubin, total 1.6 (H) 0.2 - 1.1 MG/DL Bilirubin, direct 0.6 (H) <0.4 MG/DL Alk.  phosphatase 63 50 - 136 U/L  
 AST (SGOT) 23 15 - 37 U/L  
 ALT (SGPT) 13 12 - 65 U/L  
CBC WITH AUTOMATED DIFF Collection Time: 08/25/20  5:03 AM  
Result Value Ref Range WBC 7.8 4.3 - 11.1 K/uL  
 RBC 3.07 (L) 4.05 - 5.2 M/uL HGB 7.2 (L) 11.7 - 15.4 g/dL HCT 26.4 (L) 35.8 - 46.3 % MCV 86.0 79.6 - 97.8 FL  
 MCH 23.5 (L) 26.1 - 32.9 PG  
 MCHC 27.3 (L) 31.4 - 35.0 g/dL  
 RDW 19.2 (H) 11.9 - 14.6 % PLATELET 936 863 - 113 K/uL MPV 10.9 9.4 - 12.3 FL ABSOLUTE NRBC 0.00 0.0 - 0.2 K/uL  
 DF AUTOMATED NEUTROPHILS 81 (H) 43 - 78 % LYMPHOCYTES 7 (L) 13 - 44 % MONOCYTES 9 4.0 - 12.0 % EOSINOPHILS 2 0.5 - 7.8 % BASOPHILS 0 0.0 - 2.0 % IMMATURE GRANULOCYTES 0 0.0 - 5.0 %  
 ABS. NEUTROPHILS 6.3 1.7 - 8.2 K/UL  
 ABS. LYMPHOCYTES 0.5 0.5 - 4.6 K/UL  
 ABS. MONOCYTES 0.7 0.1 - 1.3 K/UL  
 ABS. EOSINOPHILS 0.2 0.0 - 0.8 K/UL  
 ABS. BASOPHILS 0.0 0.0 - 0.2 K/UL  
 ABS. IMM. GRANS. 0.0 0.0 - 0.5 K/UL Micro: All Micro Results Procedure Component Value Units Date/Time MSSA/MRSA SC BY PCR, NASAL SWAB [[id number]] Collected:  08/25/20 0503 Order Status:  Completed Specimen:  Nasal swab Updated:  08/25/20 0617 Special Requests: NO SPECIAL REQUESTS Culture result:    
  SA target not detected. A MRSA NEGATIVE, SA NEGATIVE test result does not preclude MRSA or SA nasal colonization. CULTURE, BLOOD [[id number]] Collected:  08/19/20 1640 Order Status:  Completed Specimen:  Blood Updated:  08/24/20 1106 Special Requests: --     
  RIGHT Antecubital 
  
  Culture result: NO GROWTH 5 DAYS     
 CULTURE, BLOOD [[id number]]  (Abnormal) Collected:  08/19/20 1629 Order Status:  Completed Specimen:  Blood Updated:  08/22/20 0703 Special Requests: --     
  LEFT 
ARM 
  
  GRAM STAIN GRAM POSITIVE RODS AEROBIC BOTTLE POSITIVE RESULTS VERIFIED, PHONED TO AND READ BACK BY [id number] Scotty Newton RN @1406 8/20/20 SC   Culture result: BACILLUS MEGATERIUM     
      
 THIS ORGANISM MAY BE INDICATIVE OF CULTURE CONTAMINATION, HOWEVER, CLINICAL CORRELATION NEEDS TO BE EVALUATED, AS EACH CASE IS UNIQUE. Other studies last 24 hours: 
Xr Chest Sngl V Result Date: 8/25/2020 IMPRESSION: Slightly improved pulmonary edema. All imaging, labs reviewed. ASSESSMENT Hospital Problems as of 8/25/2020 Date Reviewed: 8/21/2020 Codes Class Noted - Resolved POA Anasarca ICD-10-CM: R60.1 ICD-9-CM: 782.3  8/24/2020 - Present Yes Acute pulmonary edema (HCC) ICD-10-CM: J81.0 ICD-9-CM: 518.4  8/24/2020 - Present Yes Moderate to severe pulmonary hypertension (HCC) ICD-10-CM: I27.20 ICD-9-CM: 416.8  8/20/2020 - Present Yes Acute diastolic CHF (congestive heart failure) (HCC) ICD-10-CM: I50.31 ICD-9-CM: 428.31, 428.0  8/20/2020 - Present Yes * (Principal) Acute on chronic respiratory failure with hypoxia and hypercapnia (HCC) ICD-10-CM: J96.21, J96.22 
ICD-9-CM: 518.84, 786.09, 799.02  8/19/2020 - Present Yes Obesity hypoventilation syndrome (HCC) (Chronic) ICD-10-CM: B24.2 ICD-9-CM: 278.03  6/23/2020 - Present Yes Atrial fibrillation (HCC) (Chronic) ICD-10-CM: I48.91 
ICD-9-CM: 427.31  6/20/2020 - Present Yes Suspected sleep apnea (Chronic) ICD-10-CM: H43.865 ICD-9-CM: 781.99  6/20/2020 - Present Yes Bronchiectasis ([address] (Chronic) ICD-10-CM: J47.9 ICD-9-CM: 494.0  5/5/2020 - Present Yes Hypertension (Chronic) ICD-10-CM: I10 
ICD-9-CM: 401.9  5/5/2020 - Present Yes Acute on chronic respiratory failure with hypoxia, on 4L oxygen at home Acute diastolic heart failure JEANNE - BIPAP 
-Continue in CVICU 
- ECHO 6/20 with preserved EF 
- starting Lasix gtt, add albumin IV 
- pulm and cards following - CTA chest without PE, shows BL pulm edema, small-mod BL pleural effusions, severe anasarca, small to moderate ascites 
  
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 L renal subcapsular hematoma, moderate Incidentally seen on CT. Unclear cause. 
- discussed with renal - recommended repeat imaging in 2 days, follow hgb. - hgb stable 
 - repeat CT once patient off of BiPAP Afib with RVR 
- continue diltiazem, metoprolol, Pradaxa - cards following 
- starting cardizem gtt 
  
L AC infection at site of previous IV 
-MRSA swab negative 
-Discontinue vancomycin 
-Start doxycycline oral 
 
RAMYA, resolved 
  
HTN 
- losartan d/c'd due to soft BP Proph - Pradaxa Dispo - too early to tell 
  
 
 
Signed By: Grzegorz Delacruz MD   
 August 25, 2020

## 2020-08-26 PROBLEM — Z95.1 S/P CABG X 4: Status: ACTIVE | Noted: 2020-01-01

## 2020-08-26 NOTE — PROGRESS NOTES
Patient was made DNR and then I was called that she passed. Patient is unresponsive to voice, pain ,pupils are fixed and dilated, no heart or breath sound present. Patient was pronounced at 3.28 pm 
 
Family notified. Death certificate done on web death Robyn Morales MD

## 2020-08-26 NOTE — PROGRESS NOTES
[facility] CARDIOLOGY PROGRESS NOTE 
      
 
8/26/2020 7:09 AM 
 
Admit Date: 8/19/2020 Subjective:  
Patient remains on bipap. 70% FiO2. Net 3L diuresis again yesterday. ROS: 
Cardiovascular:  As noted above Objective:  
  
Vitals:  
 08/26/20 0501 08/26/20 1069 08/26/20 0601 08/26/20 0701 BP: 143/66  141/64 146/70 Pulse: 91  88 83 Resp: 28  (!) 40 (!) 42 Temp:      
SpO2: 93%  93% (!) 89% Weight:  266 lb 15.6 oz (121.1 kg) Height:  5' 2\" (1.575 m) Physical Exam: 
General-No Acute Distress Neck- supple, no JVD 
CV- irregular rate and rhythm no MRG Lung- wheeze bilaterally Abd- soft, nontender, nondistended Ext- 2+ edema bilaterally. Skin- warm and dry Data Review:  
Recent Labs  
  08/26/20 
0347 08/25/20 
0503  141  
K 3.4* 3.9 MG 2.3 2.2 BUN 18 14 CREA 0.84 0.80 GLU 94 94 WBC 8.3 7.8 HGB 7.5* 7.2* HCT 26.2* 26.4*  
 176 SUMMARY: 
  
-  Left ventricle: Systolic function was normal. Ejection fraction was 
estimated in the range of 50 % to 55 %. There were no regional wall motion 
abnormalities. There was mild concentric hypertrophy. 
  
-  Right ventricle: The ventricle was dilated. 
  
-  Left atrium: The atrium was moderately dilated. 
  
-  Right atrium: The atrium was moderately to markedly dilated. 
  
-  Inferior vena cava, hepatic veins: The inferior vena cava was dilated. The 
respirophasic change in diameter was less than 50%.   
-  Mitral valve: There was mild regurgitation. 
  
-  Tricuspid valve: There was moderate regurgitation. 
  
 
Assessment/Plan:  
 
Principal Problem: 
  Acute on chronic respiratory failure with hypoxia and hypercapnia ([address] (8/19/2020) This is complicated with massive obesity, anasarca, pulmonary edema in the face of severe PHTN. Prognosis is poor. She has been admitted almost every month for the last 3-4 months. IV lasix gtt again today.   Wean off of bipap as she tolerates. Degree of hypoxia is out of proportion with CHF. Consider palliative care consult and transition to hospice at [place]. Active Problems: 
  Bronchiectasis ([address] (5/5/2020) Managed per Pulmonary Hypertension (5/5/2020) Well controlled today Atrial fibrillation ([address] (6/20/2020) Rate better controlled. Increase cardizem and metoprolol and wean off of cardizem gtt. Remain on Pradaxa. Suspected sleep apnea (6/20/2020) CPAP/Bipap while sleeping Obesity hypoventilation syndrome ([address] (6/23/2020) CPAP/Bipap while sleeping Moderate to severe pulmonary hypertension ([address] (8/20/2020) This is chronic and prognosis is poor Acute diastolic CHF (congestive heart failure) ([address] (8/20/2020) IV lasix as above Anasarca (8/24/2020) IV lasix as above Acute pulmonary edema ([address] (8/24/2020) IV lasix as above Siddharth Cook MD 
8/26/2020 7:09 AM

## 2020-08-26 NOTE — PROGRESS NOTES
Patient agitated,  at bedside. SpO2 70s. Notified Dr. Devaughn Pederson. Palliative care consulted. Ativan and morphine given.  does not want to be at the bedside for fear of making the situation worse. He stepped out of the unit for now. Unable to contact son. No number available.

## 2020-08-26 NOTE — PROGRESS NOTES
Hospitalist Progress Note 2020 Admit Date: 2020  4:17 PM  
NAME: Nathan Boswell :  1950 MRN:  [id number] Attending: Sydni Schafer MD 
PCP:  Tremayne Cuadra MD 
 
SUBJECTIVE:  
 
79 yo CF with past history of bronchiectasis, JEANNE complicated by pulmonary hypertension with chronic hypoxic respiratory failure (on 4L oxygen at home), Afib (on Pradaxa) presents with worsening shortness of breath. Denies associated fevers/chills, chest pain, worsening productive cough, abdominal pain, nausea/vomiting or diarrhea. Patient was outside her PCP office with SpO2 of 60% on her home 4L. She was transported via EMS and placed on BiPAP. At bedside, patient says she has not missed any doses of her medications \"except for my potassium. \" In ED, pt weaned to [address] [facility] [place], FiO2 70%, CXR with bibasilar airspace opacities. She was started on Lasix IV, pulm and cards consulted. Today: Patient remains on BiPAP and furosemide GTT. Patient is desaturating while on BiPAP at 100% FiO2. Poor prognosis. Pulmonology is agreed to take over as primary. Review of Systems negative with exception of pertinent positives noted above PHYSICAL EXAM  
 
Patient Vitals for the past 24 hrs: 
 Temp Pulse Resp BP SpO2  
20 1201  (!) 105 (!) 45 165/74 92 % 20 1101  (!) 104 (!) 46 158/73 92 % 20 1001  (!) 102 (!) 45 171/71 90 % 20 0829     93 % 20 0701 98 °F (36.7 °C) 83 (!) 42 146/70 (!) 89 % 20 0601  88 (!) 40 141/64 93 % 20 0501  91 28 143/66 93 % 20 0401  80 (!) 37 136/64 (!) 88 % 20 0345     91 % 20 0301 98.9 °F (37.2 °C) 83 30 144/69 91 % 20 0207     92 % 20 0201  82 (!) 47 137/67 (!) 88 % 20 0128  84 (!) 44 133/59 92 % 20 0101  80 (!) 43 (!) 172/119 90 % 20 0001  82 (!) 41 (!) 192/93 (!) 89 % 20 2330     91 % 20 2320  82 (!) 36 195/85 90 % 08/25/20 2303  82 (!) 44 174/84 90 % 08/25/20 2301 97.5 °F (36.4 °C) 86 (!) 39 181/78 90 % 08/25/20 2200  89 (!) 33 191/85 92 % 08/25/20 2101  87 26 174/85 93 % 08/25/20 2000  80 (!) 36 175/74 94 % 08/25/20 1955     93 % 08/25/20 1900 97.1 °F (36.2 °C) 78 30 168/75 94 % 08/25/20 1845  80 (!) 34  91 % 08/25/20 1830  81 30  91 % 08/25/20 1815  76 (!) 41  93 % 08/25/20 1800  79 (!) 42 160/73 90 % 08/25/20 1745  79 22  93 % 08/25/20 1730  78 24  94 % 08/25/20 1715  82 30  95 % 08/25/20 1700  82 24 160/71 93 % 08/25/20 1630  84 28  94 % 08/25/20 1615  82 28  94 % 08/25/20 1600  83 (!) 35 155/70 95 % 08/25/20 1545  82 28  96 % 08/25/20 1530  83 29  95 % 08/25/20 1515  81 (!) 31  94 % 08/25/20 1500  79 (!) 34 150/74 93 % 08/25/20 1445  80 (!) 42  94 % 08/25/20 1430  65 18  93 % 08/25/20 1415  76 27  96 % 08/25/20 1401  72 18 143/66 94 % 08/25/20 1400  79 14  95 % 08/25/20 1345  68 29  94 % 08/25/20 1330  78 (!) 32  94 % Oxygen Therapy O2 Sat (%): 92 % (08/26/20 1201) Pulse via Oximetry: 105 beats per minute (08/26/20 1201) O2 Device: BIPAP (08/26/20 0829) O2 Flow Rate (L/min): 60 l/min (08/24/20 0926) O2 Temperature: 87.8 °F (31 °C) (08/25/20 0814) FIO2 (%): 70 % (08/26/20 0829) Intake/Output Summary (Last 24 hours) at 8/26/2020 1327 Last data filed at 8/26/2020 8695 Gross per 24 hour Intake 478.83 ml Output 2870 ml Net -2391.17 ml General: Moderate distress Lungs: Moderately labored respirations on BiPAP, decreased BS with wheezing BL, unable to speak in complete sentences, tachypneic Heart:  Irregularly irregular, rates 180s Abdomen: Soft, Non distended, Non tender, Positive bowel sounds Extremities: Significant anasarca, L AC with area of induration with minimal purulence at site of previous IV, no fluctuance, unable to express any additional fluid Neurologic:  No focal deficits Psych:  Calm, cooperative Recent Results (from the past 24 hour(s)) MAGNESIUM Collection Time: 08/26/20  3:47 AM  
Result Value Ref Range Magnesium 2.3 1.8 - 2.4 mg/dL METABOLIC PANEL, COMPREHENSIVE Collection Time: 08/26/20  3:47 AM  
Result Value Ref Range Sodium 139 136 - 145 mmol/L Potassium 3.4 (L) 3.5 - 5.1 mmol/L Chloride 90 (L) 98 - 107 mmol/L  
 CO2 42 (HH) 21 - 32 mmol/L Anion gap 7 7 - 16 mmol/L Glucose 94 65 - 100 mg/dL BUN 18 8 - 23 MG/DL Creatinine 0.84 0.6 - 1.0 MG/DL  
 GFR est AA >60 >60 ml/min/1.73m2 GFR est non-AA >60 >60 ml/min/1.73m2 Calcium 8.5 8.3 - 10.4 MG/DL Bilirubin, total 2.0 (H) 0.2 - 1.1 MG/DL  
 ALT (SGPT) 12 12 - 65 U/L  
 AST (SGOT) 16 15 - 37 U/L Alk. phosphatase 64 50 - 136 U/L Protein, total 6.5 6.3 - 8.2 g/dL Albumin 2.8 (L) 3.2 - 4.6 g/dL Globulin 3.7 (H) 2.3 - 3.5 g/dL A-G Ratio 0.8 (L) 1.2 - 3.5    
CBC WITH AUTOMATED DIFF Collection Time: 08/26/20  3:47 AM  
Result Value Ref Range WBC 8.3 4.3 - 11.1 K/uL  
 RBC 3.18 (L) 4.05 - 5.2 M/uL HGB 7.5 (L) 11.7 - 15.4 g/dL HCT 26.2 (L) 35.8 - 46.3 % MCV 82.4 79.6 - 97.8 FL  
 MCH 23.6 (L) 26.1 - 32.9 PG  
 MCHC 28.6 (L) 31.4 - 35.0 g/dL  
 RDW 18.7 (H) 11.9 - 14.6 % PLATELET 593 593 - 544 K/uL MPV 10.3 9.4 - 12.3 FL ABSOLUTE NRBC 0.00 0.0 - 0.2 K/uL  
 DF AUTOMATED NEUTROPHILS 84 (H) 43 - 78 % LYMPHOCYTES 5 (L) 13 - 44 % MONOCYTES 7 4.0 - 12.0 % EOSINOPHILS 3 0.5 - 7.8 % BASOPHILS 0 0.0 - 2.0 % IMMATURE GRANULOCYTES 0 0.0 - 5.0 %  
 ABS. NEUTROPHILS 7.0 1.7 - 8.2 K/UL  
 ABS. LYMPHOCYTES 0.4 (L) 0.5 - 4.6 K/UL  
 ABS. MONOCYTES 0.6 0.1 - 1.3 K/UL  
 ABS. EOSINOPHILS 0.2 0.0 - 0.8 K/UL  
 ABS. BASOPHILS 0.0 0.0 - 0.2 K/UL  
 ABS. IMM. GRANS. 0.0 0.0 - 0.5 K/UL PHOSPHORUS Collection Time: 08/26/20  3:47 AM  
Result Value Ref Range Phosphorus 3.8 (H) 2.3 - 3.7 MG/DL  
TRIGLYCERIDE  Collection Time: 08/26/20  3:47 AM  
 Result Value Ref Range Triglyceride 62 35 - 150 MG/DL  
POC G3 Collection Time: 08/26/20 11:04 AM  
Result Value Ref Range Device: BIPAP    
 FIO2 (POC) 85 % pH (POC) 7.61 (HH) 7.35 - 7.45    
 pCO2 (POC) 45.3 (H) 35 - 45 MMHG  
 pO2 (POC) 70 (L) 75 - 100 MMHG  
 HCO3 (POC) 44.9 (H) 22 - 26 MMOL/L  
 sO2 (POC) 96 95 - 98 % Base excess (POC) 21 mmol/L Tidal volume 480 ml Set Rate 12 bpm  
 PEEP/CPAP (POC) 14 cmH2O  
 PIP (POC) 26 Allens test (POC) YES Inspiratory Time 0.9 sec Site RIGHT RADIAL Specimen type (POC) ARTERIAL Performed by Stevenifer   
 CO2, POC 46 MMOL/L Exhaled minute volume 15.00 L/min COLLECT TIME 1,100 Micro: All Micro Results Procedure Component Value Units Date/Time MSSA/MRSA SC BY PCR, NASAL SWAB [[id number]] Collected:  08/25/20 0503 Order Status:  Completed Specimen:  Nasal swab Updated:  08/25/20 3804 Special Requests: NO SPECIAL REQUESTS Culture result:    
  SA target not detected. A MRSA NEGATIVE, SA NEGATIVE test result does not preclude MRSA or SA nasal colonization. CULTURE, BLOOD [[id number]] Collected:  08/19/20 1640 Order Status:  Completed Specimen:  Blood Updated:  08/24/20 1106 Special Requests: --     
  RIGHT Antecubital 
  
  Culture result: NO GROWTH 5 DAYS     
 CULTURE, BLOOD [[id number]]  (Abnormal) Collected:  08/19/20 1629 Order Status:  Completed Specimen:  Blood Updated:  08/22/20 0703 Special Requests: --     
  LEFT 
ARM 
  
  GRAM STAIN GRAM POSITIVE RODS AEROBIC BOTTLE POSITIVE RESULTS VERIFIED, PHONED TO AND READ BACK BY Marleni Newton RN @8603 8/20/20 SC Culture result: BACILLUS MEGATERIUM     
      
  THIS ORGANISM MAY BE INDICATIVE OF CULTURE CONTAMINATION, HOWEVER, CLINICAL CORRELATION NEEDS TO BE EVALUATED, AS EACH CASE IS UNIQUE. Other studies last 24 hours: 
No results found. All imaging, labs reviewed. ASSESSMENT Hospital Problems as of 8/26/2020 Date Reviewed: 8/21/2020 Codes Class Noted - Resolved POA Anasarca ICD-10-CM: R60.1 ICD-9-CM: 782.3  8/24/2020 - Present Yes Acute pulmonary edema (HCC) ICD-10-CM: J81.0 ICD-9-CM: 518.4  8/24/2020 - Present Yes Moderate to severe pulmonary hypertension (HCC) ICD-10-CM: I27.20 ICD-9-CM: 416.8  8/20/2020 - Present Yes Acute diastolic CHF (congestive heart failure) (HCC) ICD-10-CM: I50.31 ICD-9-CM: 428.31, 428.0  8/20/2020 - Present Yes * (Principal) Acute on chronic respiratory failure with hypoxia and hypercapnia (HCC) ICD-10-CM: J96.21, J96.22 
ICD-9-CM: 518.84, 786.09, 799.02  8/19/2020 - Present Yes Obesity hypoventilation syndrome (HCC) (Chronic) ICD-10-CM: A50.1 ICD-9-CM: 278.03  6/23/2020 - Present Yes Atrial fibrillation (HCC) (Chronic) ICD-10-CM: I48.91 
ICD-9-CM: 427.31  6/20/2020 - Present Yes Suspected sleep apnea (Chronic) ICD-10-CM: Z75.729 ICD-9-CM: 781.99  6/20/2020 - Present Yes Bronchiectasis ([address] (Chronic) ICD-10-CM: J47.9 ICD-9-CM: 494.0  5/5/2020 - Present Yes Hypertension (Chronic) ICD-10-CM: I10 
ICD-9-CM: 401.9  5/5/2020 - Present Yes Acute on chronic respiratory failure with hypoxia, on 4L oxygen at home Acute diastolic heart failure JEANNE - BIPAP 
-Continue in CVICU 
- ECHO 6/20 with preserved EF 
- starting Lasix gtt, add albumin IV 
- pulm and cards following - CTA chest without PE, shows BL pulm edema, small-mod BL pleural effusions, severe anasarca, small to moderate ascites 
  
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 L renal subcapsular hematoma, moderate Incidentally seen on CT. Unclear cause. 
- discussed with renal - recommended repeat imaging in 2 days, follow hgb. - hgb stable 
 - repeat CT once patient off of BiPAP Afib with RVR 
- continue diltiazem, metoprolol, Pradaxa - cards following 
- starting cardizem gtt 
  
L AC infection at site of previous IV 
-MRSA swab negative 
-Discontinue vancomycin 
-Start doxycycline oral 
 
RAMYA, resolved 
  
HTN 
- losartan d/c'd due to soft BP Hospitalist service will sign off at this time. Please call for further questions or concerns. Signed By: Mark Crouch MD   
 August 26, 2020

## 2020-08-26 NOTE — CONSULTS
Palliative Care Patient: Lisa Bernard MRN: [id number]  SSN: [id number] YOB: 1950  Age: 78 y.o. Sex: female Date of Request: 2020 Date of Consult:  2020 Reason for Consult:  pain and symptom management Requesting Physician: Cee Almazan NP/Dr. Job Maxwell Assessment/Plan:  
 
Principal Diagnosis: Dyspnea  R06.00 Additional Diagnoses: · Altered Mental Status R41.82 
· Anxiety  F41.1 · Debility, Unspecified  R53.81 · Fatigue, Lethargy  R53.83 · Respiratory Failure, Acute on Chronic  J96.20 · Encounter for Palliative Care  Z51.5 Palliative Performance Scale (PPS): 
  
 
Medical Decision Making:  
Reviewed and summarized chart from admission to present. Discussed case with appropriate providers: primary RN; Cee Almazan NP Reviewed laboratory and x-ray data. Pt lethargic, moderate respiratory distress on full face mask BIPAP noted. No family at bedside. Pt with tachypnea and accessory muscle use. She will open her eyes to voice and nods her head yes when asked if she is having trouble breathing. Assured her of our ongoing care. Pt is a DNR, but not full comfort measures at this time. Will provide Morphine 2 mg IV and Morphine 4 mg IV q 2 hours as needed for dyspnea, as well as Ativan 1 mg IV q 4 hours PRN. Pt is hypoxic on 100% FiO2 via BIPAP at present, and her O2 sat continues to drop. Discussed with Kent Galeazzi, RN. Pt likely to  today. Will continue to follow. Will discuss findings with members of the interdisciplinary team.   
 
Thank you for this referral.    
 
  
. 
 
Subjective:  
 
History obtained from:  Patient, Care Provider and Chart Chief Complaint: Respiratory failure, CHF History of Present Illness:  Ms. Ramona Carson is a 79 yo female with PMH of bronchiectasis, diastolic heart failure, JEANNE and pulmonary HTN, a fib, HTN, and other history as listed below.   She presented to PCP on  with worsening shortness of breath for several hours and found to have O2 sat in the 60s; she was transported to ER via EMS. She was placed on BiPAP and admitted for further management of acute on chronic respiratory failure and afib with RVR. She was on Airvo alternating with BiPAP HS, but required going back on BiPAP on 8/24. Patient with worsening respiratory status, on 100% FiO2 on BiPAP on 8/26. Patient now DNR, and palliative care is consulted to assist with symptom management. Advance Directive: No      
Code Status:  DNR Health Care Power of : No - Patient does not have a 5 [facility]. Past Medical History:  
Diagnosis Date  Bronchiectasis ([facility]  Congestive heart failure (CHF) ([facility]  HTN (hypertension)  Inguinal hernia  Pneumonia Past Surgical History:  
Procedure Laterality Date  HX BACK SURGERY    
 HX HYSTERECTOMY Family History Problem Relation Age of Onset  Diabetes Sister  Diabetes Brother Social History Tobacco Use  Smoking status: Never Smoker  Smokeless tobacco: Never Used  Tobacco comment: Has been second hand smoke all her life Substance Use Topics  Alcohol use: Never Frequency: Never Prior to Admission medications Medication Sig Start Date End Date Taking? Authorizing Provider  
dabigatran etexilate (Pradaxa) 150 mg capsule Take 1 Cap by mouth every twelve (12) hours. 8/10/20   Kevin Ngo MD  
Oxygen 4LPM    Provider, Historical  
potassium chloride (K-DUR, KLOR-CON) 20 mEq tablet Take  by mouth two (2) times a day. Provider, Historical  
dilTIAZem ER (CARDIZEM CD) 120 mg capsule Take 1 Cap by mouth daily. 6/30/20   Kevin Ngo MD  
furosemide (LASIX) 40 mg tablet Take  by mouth daily. Provider, Historical  
losartan (COZAAR) 50 mg tablet Take 1 Tab by mouth daily.  Indications: high blood pressure 6/23/20   Norma Astorga MD  
 metoprolol tartrate (LOPRESSOR) 100 mg IR tablet Take 1 Tab by mouth every twelve (12) hours. 6/23/20   Silvina Greene MD  
loratadine (Claritin) 10 mg tablet Take 10 mg by mouth daily as needed for Allergies. Provider, Historical  
ergocalciferol (Vitamin D2) 1,250 mcg (50,000 unit) capsule Take 50,000 Units by mouth every seven (7) days. Taken on Sunday, per patient    Provider, Historical  
acetaminophen (TYLENOL) 325 mg tablet Take 2 Tabs by mouth every six (6) hours as needed for Pain. 5/7/20   Wayne Turner MD  
albuterol (PROVENTIL HFA, VENTOLIN HFA, PROAIR HFA) 90 mcg/actuation inhaler Take 1 Puff by inhalation every four (4) hours as needed for Wheezing or Shortness of Breath. 5/7/20   Wayne Turner MD  
aspirin 81 mg chewable tablet Take 81 mg by mouth daily. Provider, Historical  
 
 
Allergies Allergen Reactions  Pseudoephedrine Other (comments) \"gave me high blood pressure\" Review of Systems: 
Review of systems not obtained due to patient factors- AMS. She was able to endorse dyspnea Objective:  
 
Visit Vitals /74 Pulse (!) 125 Temp 98 °F (36.7 °C) Resp (!) 43 Ht 5' 2\" (1.575 m) Wt 266 lb 15.6 oz (121.1 kg) SpO2 91% BMI 48.83 kg/m² Physical Exam: 
 
General:  Lethargic. Moderate respiratory distress noted Eyes:  Conjunctivae/corneas clear Nose: Nares normal. Septum midline. Full face BIPAP mask Neck: Supple, symmetrical, trachea midline Lungs:   Labored respirations Heart:  Irregular rate and rhythm, tachycardic Abdomen:   Soft, obese, non-tender, non-distended Extremities: Normal, atraumatic, no cyanosis or edema Skin: Skin color, texture, turgor normal. Scattered bruising Neurologic: Nonfocal  
Psych: Lethargic Assessment:  
 
Hospital Problems  Date Reviewed: 8/21/2020 Codes Class Noted POA Anasarca ICD-10-CM: R60.1 ICD-9-CM: 782.3  8/24/2020 Yes Acute pulmonary edema (HCC) ICD-10-CM: J81.0 ICD-9-CM: 518.4  8/24/2020 Yes Moderate to severe pulmonary hypertension (HCC) ICD-10-CM: I27.20 ICD-9-CM: 416.8  8/20/2020 Yes Acute diastolic CHF (congestive heart failure) (HCC) ICD-10-CM: I50.31 ICD-9-CM: 428.31, 428.0  8/20/2020 Yes * (Principal) Acute on chronic respiratory failure with hypoxia and hypercapnia (HCC) ICD-10-CM: J96.21, J96.22 
ICD-9-CM: 518.84, 786.09, 799.02  8/19/2020 Yes Obesity hypoventilation syndrome (HCC) (Chronic) ICD-10-CM: U99.1 ICD-9-CM: 278.03  6/23/2020 Yes Atrial fibrillation (HCC) (Chronic) ICD-10-CM: I48.91 
ICD-9-CM: 427.31  6/20/2020 Yes Suspected sleep apnea (Chronic) ICD-10-CM: F46.183 ICD-9-CM: 781.99  6/20/2020 Yes Bronchiectasis ([address] (Chronic) ICD-10-CM: J47.9 ICD-9-CM: 494.0  5/5/2020 Yes Hypertension (Chronic) ICD-10-CM: I10 
ICD-9-CM: 401.9  5/5/2020 Yes Signed By: Marko Stone NP August 26, 2020

## 2020-08-26 NOTE — PROGRESS NOTES
A follow up visit was made to the patient. Emotional support, spiritual presence and  
prayer were provided for the patient. The patient is wearing a Bi pap mask. TAMI Castro

## 2020-08-26 NOTE — PROGRESS NOTES
Pt's BP trending upward, given scheduled 50mg Lopressor early @ 2211. Upon recheck /85. Notified Dr. Melo Reddy. Orders to give 50mg Lopressor PO once. Patient Vitals for the past 4 hrs: 
 Pulse Resp BP SpO2  
08/25/20 2320 82 (!) 36 195/85 90 % 08/25/20 2303 82 (!) 44 174/84 90 % 08/25/20 2301 86 (!) 39 181/78 90 % 08/25/20 2200 89 (!) 33 191/85 92 % 08/25/20 2101 87 26 174/85 93 % 08/25/20 2000 80 (!) 36 175/74 94 %

## 2020-08-26 NOTE — DISCHARGE SUMMARY
Discharge Summary Patient: Corrine Berger MRN: [id number]  SSN: [id number] YOB: 1950  Age: 78 y.o. Sex: female Admit Date: 8/19/2020 Discharge Date: 8/26/2020 Admission Diagnoses: Acute on chronic [facility] failure [facility] [J96.21] Discharge Diagnoses:  
Problem List as of 8/26/2020 Date Reviewed: 8/21/2020 Codes Class Noted - Resolved Anasarca ICD-10-CM: R60.1 ICD-9-CM: 782.3  8/24/2020 - Present Acute pulmonary edema (HCC) ICD-10-CM: J81.0 ICD-9-CM: 518.4  8/24/2020 - Present Moderate to severe pulmonary hypertension (HCC) ICD-10-CM: I27.20 ICD-9-CM: 416.8  8/20/2020 - Present Acute diastolic CHF (congestive heart failure) (HCC) ICD-10-CM: I50.31 ICD-9-CM: 428.31, 428.0  8/20/2020 - Present * (Principal) Acute on chronic respiratory failure with hypoxia and hypercapnia (HCC) ICD-10-CM: J96.21, J96.22 
ICD-9-CM: 518.84, 786.09, 799.02  8/19/2020 - Present Obesity hypoventilation syndrome (HCC) (Chronic) ICD-10-CM: C21.5 ICD-9-CM: 278.03  6/23/2020 - Present Atrial fibrillation (HCC) (Chronic) ICD-10-CM: I48.91 
ICD-9-CM: 427.31  6/20/2020 - Present Chronic respiratory failure with hypoxia (HCC) (Chronic) ICD-10-CM: J96.11 
ICD-9-CM: 518.83, 799.02  6/20/2020 - Present Suspected sleep apnea (Chronic) ICD-10-CM: J15.743 ICD-9-CM: 781.99  6/20/2020 - Present Bronchiectasis ([facility] [address] (Chronic) ICD-10-CM: J47.9 ICD-9-CM: 494.0  5/5/2020 - Present Hypertension (Chronic) ICD-10-CM: I10 
ICD-9-CM: 401.9  5/5/2020 - Present RESOLVED: Acute hypoxemic respiratory failure ([address] ICD-10-CM: J96.01 
ICD-9-CM: 518.81  6/21/2020 - 6/23/2020 RESOLVED: Hypertensive urgency ICD-10-CM: I16.0 ICD-9-CM: 401.9  6/20/2020 - 6/23/2020 RESOLVED: Acute respiratory failure with hypoxia and hypercarbia (HCC) ICD-10-CM: J96.01, J96.02 
ICD-9-CM: 518.81  6/20/2020 - 6/23/2020 RESOLVED: Edema due to hypervolemia ICD-10-CM: R60.9, E87.70 ICD-9-CM: 782.3, 276.69  2020 - 2020 RESOLVED: Suspected COVID-19 virus infection ICD-10-CM: Z20.828 ICD-9-CM: V01.79  2020 - 2020 RESOLVED: Pneumonia ICD-10-CM: J18.9 ICD-9-CM: 091  2020 - 2020 Discharge Condition:  Hospital Course:  
77 yo CF with past history of bronchiectasis, JEANNE complicated by pulmonary hypertension with chronic hypoxic respiratory failure (on 4L oxygen at home), Afib (on Pradaxa) presents with worsening shortness of breath. Denies associated fevers/chills, chest pain, worsening productive cough, abdominal pain, nausea/vomiting or diarrhea. Patient was outside her PCP office with SpO2 of 60% on her home 4L. She was transported via EMS and placed on BiPAP. At bedside, patient says she has not missed any doses of her medications \"except for my potassium. \" In ED, pt weaned to [address] [place], FiO2 70%, CXR with bibasilar airspace opacities. She was started on Lasix IV, pulm and cards consulted. Patient remains on BiPAP and furosemide GTT. Patient is desaturating while on BiPAP at 100% FiO2. Patient rapidly decompensated on the afternoon of 2020. Patient began to have significant desaturations and was given IV morphine to assist with air hunger. Patient had been on maximal treatment and remain DNR. Patient unfortunately  from respiratory failure and was pronounced by Dr. Farshad Jyo at 3768. Cause of Death: Respiratory failure Physical Exam:  
Per Dr. Rosa Cisse's death exam: \"Patient is unresponsive to voice, pain ,pupils are fixed and dilated, no heart or breath sound present. \" 
 
Consults: Cardiology and Pulmonary/Critical Care Disposition: Mortuary Discharge Medications:  
Current Discharge Medication List  
  
CONTINUE these medications which have NOT CHANGED Details dabigatran etexilate (Pradaxa) 150 mg capsule Take 1 Cap by mouth every twelve (12) hours. Qty: 60 Cap, Refills: 11  
  
Oxygen 4LPM  
  
potassium chloride (K-DUR, KLOR-CON) 20 mEq tablet Take  by mouth two (2) times a day. dilTIAZem ER (CARDIZEM CD) 120 mg capsule Take 1 Cap by mouth daily. Qty: 30 Cap, Refills: 11  
  
furosemide (LASIX) 40 mg tablet Take  by mouth daily. losartan (COZAAR) 50 mg tablet Take 1 Tab by mouth daily. Indications: high blood pressure Qty: 30 Tab, Refills: 1  
  
metoprolol tartrate (LOPRESSOR) 100 mg IR tablet Take 1 Tab by mouth every twelve (12) hours. Qty: 60 Tab, Refills: 1  
  
loratadine (Claritin) 10 mg tablet Take 10 mg by mouth daily as needed for Allergies. ergocalciferol (Vitamin D2) 1,250 mcg (50,000 unit) capsule Take 50,000 Units by mouth every seven (7) days. Taken on Sunday, per patient  
  
acetaminophen (TYLENOL) 325 mg tablet Take 2 Tabs by mouth every six (6) hours as needed for Pain. Qty: 20 Tab, Refills: 0  
  
albuterol (PROVENTIL HFA, VENTOLIN HFA, PROAIR HFA) 90 mcg/actuation inhaler Take 1 Puff by inhalation every four (4) hours as needed for Wheezing or Shortness of Breath. Qty: 1 Inhaler, Refills: 1  
  
aspirin 81 mg chewable tablet Take 81 mg by mouth daily. No orders of the defined types were placed in this encounter. Signed By: Leonard Tuttle MD   
 August 26, 2020

## 2020-08-26 NOTE — PROGRESS NOTES
Pt anxious and agitated with BIPAP mask on. 65% fiO2 and sats drop to 60% when mask off for pills. Dr. Aditi Pollack notified. New orders received.

## 2020-08-26 NOTE — PROGRESS NOTES
Comprehensive Nutrition Assessment Type and Reason for Visit: Initial, Consult(management of TPN (hospitalist)) Nutrition Recommendations/Plan:  
Start TPN: 
10%DEX/ 4.25%AA at 85 ml/hr with 250 ml 20% Lipids daily To provide: 1520 kcal/d (100% of needs), 85 grams of protein/d (68% of needs), 2-- grams of CHO/d (does not exceed maximum CHO load) and 2250 ml of total volume/d (100% of needs). Lytes/L of TPN:  
Sodium 75 meq (75 meq NaCl), Potassium 40 meq (30 meq KCl, 10 meq KPO4), 3 meq Mg Other additives: MTE, MVI Vitamin and Mineral Supplement Therapy: 
Nutrition support orders for electrolyte management replacement are activated and placed on the MAR. Labs:  
BMP, Phos, Mg, Triglyceride. POC Glucoses/SSI if Glucose > 180 mg/dl on AM BMP. Nutrition Assessment:  Pt admitted with dyspnea. PMH notable for bronchiectasis, JEANNE, chronic respiratory failure on home 02. Pt on 100% BIPAP and unable to eat a PO diet. Pertinent labs: K+ 3.4 - replaced with 20 meq KCl x 2 doses, Cl 90, C02 42, Phos 3.8, Cca 9.46. Triglyceride 62. Pertinent medications: diamox, digoxin, PO cardizem, doxycycline, lasix gtt, 40 meq KCl daily PO started today Abdominal status: intact/obese with active bowel sounds. Date of last BM: 8/24. Edema: generalized anasarca, 3+ Central access: triple lumen PICC Estimated Daily Nutrient Needs: 
Energy (kcal):  8729-1056 kcal/d (11-14 kcal/kg listed .1 kg) Protein (g):  >125 gm/d (>2.5 gm/kg IBW of 50 kg/d) Fluid (ml/day):  1ml/kcal or per MD 
 
Current Nutrition Therapies: DIET CARDIAC Regular Anthropometric Measures: 
· Height:  5' 2\" (157.5 cm) · Current Body Wt:  121.1 kg (266 lb 15.6 oz) · Body mass index is 48.83 kg/m². · BMI Category:  Obese class 3 (BMI 40.0 or greater) Nutrition Diagnosis:  
· Inadequate oral intake related to impaired respiratory function as evidenced by (pt on 100% BIPAP and inability to eat. ) Nutrition Interventions:  
Food and/or Nutrient Delivery: Continue current diet, Start parenteral nutrition Coordination of Nutrition Care: (POC discussed with Gianni Maza, RN covering for primary RN) Goals: 
Meet >75% of estimated needs via PO intake or TPN within 7 days. Nutrition Monitoring and Evaluation:  
Food/Nutrient Intake Outcomes: Diet advancement/tolerance, Parenteral nutrition intake/tolerance Physical Signs/Symptoms Outcomes: Biochemical data, Hemodynamic status Discharge Planning: Too soon to determine Electronically signed by Adilia Kirk [address], [address] [place] [address], [place], [address] on 8/26/2020 at 12:45 PM

## 2020-08-26 NOTE — PROGRESS NOTES
Updated son via phone about plan of care and patient's wishes to be DNR. Son stated that he lives an hour and a half away and will make plans to come visit. All questions answered. Verbalized understanding.

## 2020-08-26 NOTE — PROGRESS NOTES
Critical Care Daily Progress Note: 8/26/2020 Ralph Gilliam Admission Date: 8/19/2020 The patient's chart is reviewed and the patient is discussed with the staff. 70 y.o.  female seen and evaluated at the request of Dr. Marleny Whittaker was admitted with hypoxia (sat of 60% on 4 liters oxygen). She was initially on bipap but has been weaned to 60% Karla Ramirez describes a gradual increase in her dyspnea and lack of mobility due to shortness of breath, dating back to December.  She was [facility] in May and June with volume overload. Her echo in June reported a normal EF but her RV was dilated and her RVSP measured 75 to 80 mmHg.  She is maintained on 4 liters of oxygen. She is morbidly obese and a sleep study has been ordered but she states that the office has not contacted her yet. Hua Gamble reports daytime fatigue and somnolence.   
  She was seen in our office on August 6 and she complained of edema and dyspnea then. Her lasix was increased from 40 mg per day to BID. She was still on this dose at the time of this admission. She does not weigh herself nor limit her fluid intake and she feels very edematous now.  Her BNP was 5211 on admission and she was hypertensive. She is currently on 40 mg lasix IV per day and her fluid balance is neg 1.1 liters.   
   She has a history of bronchiectasis which has been stable. Her PFTs this month showed pure restriction Subjective:  
 
Remains on BIPAP at 70% O2. Desats quickly if mask dislodged. Diuresed a net 2.8L yesterday. Still on lasix drip and renal function fairly stable. Says she feels better. Current Facility-Administered Medications Medication Dose Route Frequency  potassium chloride 20 mEq in 100 ml IVPB  20 mEq IntraVENous Q2H  
 dilTIAZem ER (CARDIZEM CD) capsule 180 mg  180 mg Oral Q12H  
 metoprolol tartrate (LOPRESSOR) tablet 50 mg  50 mg Oral Q6H  
 doxycycline (VIBRAMYCIN) capsule 100 mg  100 mg Oral Q12H  albumin human 25% (BUMINATE) solution 25 g  25 g IntraVENous Q6H  
 dilTIAZem (CARDIZEM) 100 mg in 0.9% sodium chloride (MBP/ADV) 100 mL infusion  0-15 mg/hr IntraVENous TITRATE  furosemide (LASIX) 100 mg in 0.9% sodium chloride 100 mL infusion  10 mg/hr IntraVENous CONTINUOUS  
 central line flush (saline) syringe 10 mL  10 mL InterCATHeter Q8H  
 digoxin (LANOXIN) tablet 0.125 mg  0.125 mg Oral DAILY  sodium chloride (NS) flush 5-40 mL  5-40 mL IntraVENous Q8H  
 sodium chloride (NS) flush 5-40 mL  5-40 mL IntraVENous PRN  
 acetaminophen (TYLENOL) tablet 650 mg  650 mg Oral Q6H PRN Or  
 acetaminophen (TYLENOL) suppository 650 mg  650 mg Rectal Q6H PRN  polyethylene glycol (MIRALAX) packet 17 g  17 g Oral DAILY PRN  promethazine (PHENERGAN) tablet 12.5 mg  12.5 mg Oral Q6H PRN Or  
 ondansetron (ZOFRAN) injection 4 mg  4 mg IntraVENous Q6H PRN  
 albuterol (PROVENTIL VENTOLIN) nebulizer solution 2.5 mg  2.5 mg Nebulization Q4H PRN  
 aspirin chewable tablet 81 mg  81 mg Oral DAILY  dabigatran etexilate (PRADAXA) capsule 150 mg  150 mg Oral Q12H  nystatin (MYCOSTATIN) 100,000 unit/gram powder   Topical BID Review of Systems Constitutional:  negative for fever, chills, sweats Cardiovascular:  negative for chest pain, palpitations, syncope Gastrointestinal:  negative for dysphagia, reflux, vomiting, diarrhea, abdominal pain, or melena Neurologic:  negative for focal weakness, numbness, headache Objective:  
 
Vitals:  
 08/26/20 0401 08/26/20 0501 08/26/20 0048 08/26/20 7965 BP: 136/64 143/66  141/64 Pulse: 80 91  88 Resp: (!) 37 28  (!) 40 Temp:      
SpO2: (!) 88% 93%  93% Weight:   266 lb 15.6 oz (121.1 kg) Height:   5' 2\" (1.575 m) Intake/Output Summary (Last 24 hours) at 8/26/2020 0437 Last data filed at 8/26/2020 0334 Gross per 24 hour Intake 478.83 ml Output 3270 ml Net -2791.17 ml Physical Exam: Constitutional:  the patient is obese and in no acute distress EENMT:  Sclera clear, pupils equal, oral mucosa moist 
Respiratory: scattered rhonchi; substantial mask leak at times Cardiovascular:  RRR without M,G,R 
Gastrointestinal: soft and non-tender; with positive bowel sounds. Musculoskeletal: warm without cyanosis. There is 1-2+ lower extremity edema. Skin:  no jaundice or rashes, scattered ecchymoses Neurologic: no gross neuro deficits Psychiatric:  alert and responsive LINES: 
peripheral 
 
DRIPS: 
lasix CXR:   
 
8/26: Unable to open images FINDINGS: The heart remains enlarged. Bilateral lung edema has slightly 
improved. No pneumothorax or significant pleural effusion is seen. A left arm PICC line remains in place. 
  
 
IMPRESSION:  
Slightly improved pulmonary edema. 8/24- pulmonary edema LAB No results for input(s): GLUCPOC in the last 72 hours. No lab exists for component: [place] Recent Labs  
  08/26/20 
0347 08/25/20 
0503 08/24/20 
5346 WBC 8.3 7.8  --   
HGB 7.5* 7.2* 8.5* HCT 26.2* 26.4*  --   
 176  --   
 
Recent Labs  
  08/26/20 
0347 08/25/20 
0503 08/24/20 
0553  141 140  
K 3.4* 3.9 4.3 CL 90* 95* 97* CO2 42* 41* 40* GLU 94 94 123* BUN 18 14 12 CREA 0.84 0.80 0.78  
MG 2.3 2.2 2.4  
CA 8.5 7.9* 8.4 ALB 2.8* 2.2*  --   
TBILI 2.0* 1.6*  --   
ALT 12 13  -- No results for input(s): PH, PCO2, PO2, HCO3, PHI, PCO2I, PO2I, HCO3I in the last 72 hours. No results for input(s): LCAD, LAC in the last 72 hours. No results for input(s): PH, PCO2, PO2, HCO3, PHI, PCO2I, PO2I, HCO3I in the last 72 hours. Patient Active Problem List  
Diagnosis Code  [facility] J47.9  Hypertension I10  Atrial fibrillation ([facility]) I48.91  
 Chronic respiratory failure with hypoxia ([facility]) J96.11  
 Suspected sleep apnea R29.818  Obesity hypoventilation syndrome (HCC) E66.2  Acute on chronic respiratory failure with hypoxia and hypercapnia (HCC) J96.21, J96.22  
 Moderate to severe pulmonary hypertension (HCC) I27.20  Acute diastolic CHF (congestive heart failure) (HCC) I50.31  
 Anasarca R60.1  Acute pulmonary edema (HCC) J81.0 Assessment:  (Medical Decision Making) Hospital Problems  Date Reviewed: 8/21/2020 Codes Class Noted POA Anasarca ICD-10-CM: R60.1 ICD-9-CM: 782.3  8/24/2020 Yes Good output continues. Acute pulmonary edema (HCC) ICD-10-CM: J81.0 ICD-9-CM: 518.4  8/24/2020 Yes CXR better yesterday. None ordered for today. Repeat in AM  
 Moderate to severe pulmonary hypertension (HCC) ICD-10-CM: I27.20 ICD-9-CM: 416.8  8/20/2020 Yes Ongoing Acute diastolic CHF (congestive heart failure) (HCC) ICD-10-CM: I50.31 ICD-9-CM: 428.31, 428.0  8/20/2020 Yes * (Principal) Acute on chronic respiratory failure with hypoxia and hypercapnia (HCC) ICD-10-CM: J96.21, J96.22 
ICD-9-CM: 518.84, 786.09, 799.02  8/19/2020 Yes On bipap Obesity hypoventilation syndrome (HCC) (Chronic) ICD-10-CM: N94.7 ICD-9-CM: 278.03  6/23/2020 Yes BIPAP Atrial fibrillation (HCC) (Chronic) ICD-10-CM: I48.91 
ICD-9-CM: 427.31  6/20/2020 Yes Ongoing and weaned to po cardizem. Suspected sleep apnea (Chronic) ICD-10-CM: B60.164 ICD-9-CM: 781.99  6/20/2020 Yes Awaiting OP PSG. Bronchiectasis ([facility] (Chronic) ICD-10-CM: J47.9 ICD-9-CM: 494.0  5/5/2020 Yes Hypertension (Chronic) ICD-10-CM: I10 
ICD-9-CM: 401.9  5/5/2020 Yes Plan:  (Medical Decision Making) Continue lasix drip; currently at 10. Continue  BIPAP. Wean to Airvo/Optiflow if able to get FIO2 to 60% or less. Replace K. CXR tomorrow. -- 
 
More than 50% of the time documented was spent in face-to-face contact with the patient and in the care of the patient on the floor/unit where the patient is located.  
 
Olga Mendez MD

## 2020-08-26 NOTE — PROGRESS NOTES
Bedside shift report given to Laxmi Bah RN (oncoming nurse) by Danielle Nance RN (offgoing nurse). Bedside shift report included the following information: SBAR, Kardex, Procedure Summary, MAR, and Recent Results.

## 2020-08-26 NOTE — PROGRESS NOTES
notified via phone of passing. He does not desire to re-visit. Will hold personal belongings. He states that the patient's wishes were to \"donate her body to science\"  House supervisor aware. [facility] notified of death.

## 2020-08-26 NOTE — PROGRESS NOTES
Critical Care Daily Progress Note: 8/26/2020 Ralph Gilliam Admission Date: 8/19/2020 The patient's chart is reviewed and the patient is discussed with the staff. 70 y.o.  female seen and evaluated at the request of Dr. Radha Stearns was admitted with hypoxia (sat of 60% on 4 liters oxygen). She was initially on bipap but has been weaned to 60% Sandra Juana describes a gradual increase in her dyspnea and lack of mobility due to shortness of breath, dating back to December.  She was [facility] in May and June with volume overload. Her echo in June reported a normal EF but her RV was dilated and her RVSP measured 75 to 80 mmHg.  She is maintained on 4 liters of oxygen. She is morbidly obese and a sleep study has been ordered but she states that the office has not contacted her yet. Edna Jaffe reports daytime fatigue and somnolence.   
  She was seen in our office on August 6 and she complained of edema and dyspnea then. Her lasix was increased from 40 mg per day to BID. She was still on this dose at the time of this admission. She does not weigh herself nor limit her fluid intake and she feels very edematous now.  Her BNP was 5211 on admission and she was hypertensive. She is currently on 40 mg lasix IV per day and her fluid balance is neg 1.1 liters.   
   She has a history of bronchiectasis which has been stable. Her PFTs this month showed pure restriction Subjective:  
 
Called to see patient who is on BIPAP ,tachypnic ,on 100 % Fi02  
continues on Lasix gtt Current Facility-Administered Medications Medication Dose Route Frequency  potassium chloride 20 mEq in 100 ml IVPB  20 mEq IntraVENous Q2H  
 metoprolol tartrate (LOPRESSOR) tablet 100 mg  100 mg Oral Q12H  
 metOLazone (ZAROXOLYN) tablet 5 mg  5 mg Oral DAILY  potassium chloride (K-DUR, KLOR-CON) SR tablet 40 mEq  40 mEq Oral DAILY  acetaZOLAMIDE (DIAMOX) 250 mg in sterile water (preservative free) 2.5 mL injection  250 mg IntraVENous Q12H  
 dilTIAZem ER (CARDIZEM CD) capsule 180 mg  180 mg Oral Q12H  
 doxycycline (VIBRAMYCIN) capsule 100 mg  100 mg Oral Q12H  
 dilTIAZem (CARDIZEM) 100 mg in 0.9% sodium chloride (MBP/ADV) 100 mL infusion  0-15 mg/hr IntraVENous TITRATE  furosemide (LASIX) 100 mg in 0.9% sodium chloride 100 mL infusion  10 mg/hr IntraVENous CONTINUOUS  
 central line flush (saline) syringe 10 mL  10 mL InterCATHeter Q8H  
 digoxin (LANOXIN) tablet 0.125 mg  0.125 mg Oral DAILY  sodium chloride (NS) flush 5-40 mL  5-40 mL IntraVENous Q8H  
 sodium chloride (NS) flush 5-40 mL  5-40 mL IntraVENous PRN  
 acetaminophen (TYLENOL) tablet 650 mg  650 mg Oral Q6H PRN Or  
 acetaminophen (TYLENOL) suppository 650 mg  650 mg Rectal Q6H PRN  polyethylene glycol (MIRALAX) packet 17 g  17 g Oral DAILY PRN  promethazine (PHENERGAN) tablet 12.5 mg  12.5 mg Oral Q6H PRN Or  
 ondansetron (ZOFRAN) injection 4 mg  4 mg IntraVENous Q6H PRN  
 albuterol (PROVENTIL VENTOLIN) nebulizer solution 2.5 mg  2.5 mg Nebulization Q4H PRN  
 aspirin chewable tablet 81 mg  81 mg Oral DAILY  dabigatran etexilate (PRADAXA) capsule 150 mg  150 mg Oral Q12H  nystatin (MYCOSTATIN) 100,000 unit/gram powder   Topical BID Review of Systems Unobtainable due to patient status Objective:  
 
Vitals:  
 08/26/20 9071 08/26/20 0601 08/26/20 0701 08/26/20 2276 BP:  141/64 146/70 Pulse:  88 83 Resp:  (!) 40 (!) 42 Temp:   98 °F (36.7 °C) SpO2:  93% (!) 89% 93% Weight: 266 lb 15.6 oz (121.1 kg) Height: 5' 2\" (1.575 m) Intake/Output Summary (Last 24 hours) at 8/26/2020 1151 Last data filed at 8/26/2020 7825 Gross per 24 hour Intake 478.83 ml Output 2970 ml Net -2491.17 ml Physical Exam:         
Constitutional:  the patient is well developed and in no acute distress,morbidly obese, on BIPAP 
EENMT:  Sclera clear, pupils equal, oral mucosa moist 
 Respiratory: crackles Cardiovascular:  RRR without M,G,R 
Gastrointestinal: soft and non-tender; with positive bowel sounds. Musculoskeletal: warm without cyanosis. There is plus 2 lower extremity edema. Skin:  no jaundice or rashes, no wounds Neurologic: no gross neuro deficits Psychiatric:  alert LINES: 
Per iv lines DRIPS: 
Lasix gtt CXR:  
 
 
LAB No results for input(s): GLUCPOC in the last 72 hours. No lab exists for component: [place] Recent Labs  
  08/26/20 0347 08/25/20 
0503 08/24/20 
3416 WBC 8.3 7.8  --   
HGB 7.5* 7.2* 8.5* HCT 26.2* 26.4*  --   
 176  --   
 
Recent Labs  
  08/26/20 0347 08/25/20 
0503 08/24/20 
0553  141 140  
K 3.4* 3.9 4.3 CL 90* 95* 97* CO2 42* 41* 40* GLU 94 94 123* BUN 18 14 12 CREA 0.84 0.80 0.78  
MG 2.3 2.2 2.4 PHOS 3.8*  --   --   
CA 8.5 7.9* 8.4 ALB 2.8* 2.2*  --   
TBILI 2.0* 1.6*  --   
ALT 12 13  --   
 
Recent Labs  
  08/26/20 
1104 PHI 7.61* PCO2I 45.3*  
PO2I 70* HCO3I 44.9* No results for input(s): LCAD, LAC in the last 72 hours. Recent Labs  
  08/26/20 
1104 PHI 7.61* PCO2I 45.3*  
PO2I 70* HCO3I 44.9* Patient Active Problem List  
Diagnosis Code  Bronchiectasis ([facility] [address] J47.9  Hypertension I10  Atrial fibrillation ([facility]) I48.91  
 Chronic respiratory failure with hypoxia ([facility]) J96.11  
 Suspected sleep apnea R29.818  Obesity hypoventilation syndrome ([facility]) E66.2  Acute on chronic respiratory failure with hypoxia and hypercapnia ([facility]) J96.21, J96.22  
 Moderate to severe pulmonary hypertension ([facility]) I27.20  Acute diastolic CHF (congestive heart failure) ([facility]) I50.31  
 Anasarca R60.1  Acute pulmonary edema ([facility]) J81.0 Assessment:  (Medical Decision Making) Hospital Problems  Date Reviewed: 8/21/2020 Codes Class Noted POA Anasarca ICD-10-CM: R60.1 ICD-9-CM: 782.3  8/24/2020 Yes Acute pulmonary edema (HCC) ICD-10-CM: J81.0 ICD-9-CM: 518.4  8/24/2020 Yes Moderate to severe pulmonary hypertension (HCC) ICD-10-CM: I27.20 ICD-9-CM: 416.8  8/20/2020 Yes Acute diastolic CHF (congestive heart failure) (HCC) ICD-10-CM: I50.31 ICD-9-CM: 428.31, 428.0  8/20/2020 Yes * (Principal) Acute on chronic respiratory failure with hypoxia and hypercapnia (HCC) ICD-10-CM: J96.21, J96.22 
ICD-9-CM: 518.84, 786.09, 799.02  8/19/2020 Yes Obesity hypoventilation syndrome (HCC) (Chronic) ICD-10-CM: I11.1 ICD-9-CM: 278.03  6/23/2020 Yes Atrial fibrillation (HCC) (Chronic) ICD-10-CM: I48.91 
ICD-9-CM: 427.31  6/20/2020 Yes Suspected sleep apnea (Chronic) ICD-10-CM: L18.892 ICD-9-CM: 781.99  6/20/2020 Yes Bronchiectasis ([facility] [address] (Chronic) ICD-10-CM: J47.9 ICD-9-CM: 494.0  5/5/2020 Yes Hypertension (Chronic) ICD-10-CM: I10 
ICD-9-CM: 401.9  5/5/2020 Yes 78 y old with morbid obesity, OHS, anasarca, severe pulmonary hypertension declining over the last year, now on 100 % BIPAP Plan:  (Medical Decision Making) -- On max support ,tachypnic declining gradually over the last year, per cardiology recommend transition to hospice - I have spoken to her  and also the patient who is awake and answer all questions appropriately and states she does not want to be on life support as she see she is declining and is tired of fighting and wants to be DNR 
- will place order and also morphine to help with WOB  
-critically ill ,spent 34 min patient care More than 50% of the time documented was spent in face-to-face contact with the patient and in the care of the patient on the floor/unit where the patient is located.  
 
Darrelyn Holstein, MD

## 2020-08-27 ENCOUNTER — APPOINTMENT (OUTPATIENT)
Dept: GENERAL RADIOLOGY | Age: 70
DRG: 291 | End: 2020-08-27
Attending: INTERNAL MEDICINE
Payer: MEDICARE

## 2021-01-25 NOTE — PROGRESS NOTES
Patient will complete functional mobility for household distances with MOD I and adaptive equipment as needed. Patient will complete functional transfers with supervision and adaptive equipment as needed. OCCUPATIONAL THERAPY: Initial Assessment, Daily Note, Discharge, and PM 5/6/2020 INPATIENT: OT Visit Days: 1 [place] / [place] / Product Type: Medicare /  
  
NAME/AGE/GENDER: Suly Caban is a 78 y.o. female PRIMARY DIAGNOSIS:  Pneumonia [J18.9] Suspected Covid-19 Virus Infection Suspected Covid-19 Virus Infection ICD-10: Treatment Diagnosis:  
 Generalized Muscle Weakness (M62.81) Precautions/Allergies: 
   Pseudoephedrine ASSESSMENT:  
 
Ms. Purnima Maier presents for pneumonia. Upon arrival, pt sitting upright in bedside chair, resting on 1L 02 via n.c. with Sp02 at 91%. Pt is alert, oriented x 4, jarred agreeable to OT evaluation. Pt reports living with spouse in a 2 story home with 8 steps to living accommodations. At baseline, pt was independent with ADLs and functional mobility with no DME required. Endorses no hx of falls. Today, pt completed all functional transfers with supervision. BUE  AROM and strength (4/5) are generally decreased but WFL. Static and dynamic standing balance are intact with no additional support required. Ambulated in room for household distances while remaining on 1L 02; Sp02 sats remaining between 87% and 92% during functional mobility. Pt returned to sitting upright in bedside chair. Pt denies requiring any additional OT needs at this time. Pt seems to be functioning at baseline for ADLs; will defer to PT for household mobility and activity tolerance. Will discharge patient from further OT services at this time. This section established at most recent assessment PROBLEM LIST (Impairments causing functional limitations): 
Decreased Activity Tolerance Decreased Pacing Skills pleasant, well nourished, well developed, in no acute distress , normal communication ability INTERVENTIONS PLANNED: (Benefits and precautions of occupational therapy have been discussed with the patient.) 
discharge TREATMENT PLAN: Frequency/Duration: discharge Rehabilitation Potential For Stated Goals: discharge REHAB RECOMMENDATIONS (at time of discharge pending progress):   
Placement: It is my opinion, based on this patient's performance to date, that Ms. Boswell may benefit from being discharged with NO further skilled therapy due to a proven ability to function at baseline. Equipment:  
none OCCUPATIONAL PROFILE AND HISTORY:  
History of Present Injury/Illness (Reason for Referral): 
See H&P Past Medical History/Comorbidities: Ms. Cuco Post  has a past medical history of Bronchiectasis ([facility] [address] Congestive heart failure (CHF) ([facility] [address] and Pneumonia. Ms. Cuco Post  has a past surgical history that includes hx hysterectomy and hx back surgery. Social History/Living Environment:  
Home Environment: Private residence # Steps to Enter: 18(from basement entrance - which pt uses regularly) Rails to Enter: Yes One/Two Story Residence: Two story # of Interior Steps: 8 Lift Chair Available: No 
Living Alone: No 
Support Systems: Spouse/Significant Other/Partner Patient Expects to be Discharged to[de-identified] Private residence Current DME Used/Available at Home: None Prior Level of Function/Work/Activity: 
Independent with ADLs and functional mobility with no DME. Personal Factors:   
      Sex:  female Age:  78 y.o. Other factors that influence how disability is experienced by the patient:  multiple co-morbidities Number of Personal Factors/Comorbidities that affect the Plan of Care: Brief history (0):  LOW COMPLEXITY ASSESSMENT OF OCCUPATIONAL PERFORMANCE[de-identified]  
Activities of Daily Living:  
Basic ADLs (From Assessment) Complex ADLs (From Assessment) Feeding: Independent Oral Facial Hygiene/Grooming: Independent Bathing: Stand-by assistance Upper Body Dressing: Independent Lower Body Dressing: Modified independent Toileting: Modified independent Instrumental ADL Meal Preparation: Minimum assistance Homemaking: Minimum assistance Grooming/Bathing/Dressing Activities of Daily Living Cognitive Retraining Safety/Judgement: Fall prevention Bed/Mat Mobility Sit to Stand: Supervision Stand to Sit: Supervision Bed to Chair: Supervision Most Recent Physical Functioning:  
Gross Assessment: 
  
         
  
Posture: 
  
Balance: 
Sitting: Intact Standing: Impaired Standing - Static: Good Standing - Dynamic : Good Bed Mobility: 
  
Wheelchair Mobility: 
  
Transfers: 
Sit to Stand: Supervision Stand to Sit: Supervision Bed to Chair: Supervision Interventions: Safety awareness training;Weight shifting training/Pressure relief;Verbal cues Patient Vitals for the past 6 hrs: 
 BP BP Patient Position SpO2 Pulse 20 1101 119/70 At rest 95 % 91  
20 1507 120/69 At rest 91 % 86 Mental Status Neurologic State: Alert Orientation Level: Oriented X4 Cognition: Follows commands, Appropriate decision making Perception: Appears intact Perseveration: No perseveration noted Safety/Judgement: Fall prevention Physical Skills Involved: Activity Tolerance Cognitive Skills Affected (resulting in the inability to perform in a timely and safe manner): 
none  Psychosocial Skills Affected: 
Habits/Routines Environmental Adaptation Number of elements that affect the Plan of Care: 1-3:  LOW COMPLEXITY CLINICAL DECISION MAKIN [facility] [address] [id number] AM-PAC 6 Clicks Daily Activity Inpatient Short Form How much help from another person does the patient currently need. .. Total A Lot A Little None 1. Putting on and taking off regular lower body clothing? [] 1   [] 2   [] 3   [x] 4  
2. Bathing (including washing, rinsing, drying)?    [] 1   [] 2   [x] 3   [] 4  
 3.  Toileting, which includes using toilet, bedpan or urinal?   [] 1   [] 2   [] 3   [x] 4  
4. Putting on and taking off regular upper body clothing? [] 1   [] 2   [] 3   [x] 4  
5. Taking care of personal grooming such as brushing teeth? [] 1   [] 2   [] 3   [x] 4  
6. Eating meals? [] 1   [] 2   [] 3   [x] 4  
© 2007, Trustees of [address] [place] [address], under license to [employer]. All rights reserved Score:  Initial: 23 Most Recent: X (Date: -- ) Interpretation of Tool:  Represents activities that are increasingly more difficult (i.e. Bed mobility, Transfers, Gait). Medical Necessity:    
discharge Reason for Services/Other Comments: 
discharge Use of outcome tool(s) and clinical judgement create a POC that gives a: LOW COMPLEXITY  
 
 
 
TREATMENT:  
(In addition to Assessment/Re-Assessment sessions the following treatments were rendered) Pre-treatment Symptoms/Complaints:   
Pain: Initial:  
Pain Intensity 1: 0 /10 Post Session:  same Therapeutic Activity: (    10 minutes): Therapeutic activities including Chair transfers and Ambulation on level ground to improve mobility, strength, balance, and coordination. Required minimal   to promote static and dynamic balance in standing. Braces/Orthotics/Lines/Etc:  
O2 Device: Nasal cannula Treatment/Session Assessment:   
Response to Treatment:  tolerated well with no issues noted. Interdisciplinary Collaboration: Occupational Therapist 
Registered Nurse After treatment position/precautions:  
Up in chair Bed/Chair-wheels locked Call light within reach RN notified Compliance with Program/Exercises: Will assess as treatment progresses. Recommendations/Intent for next treatment session: \"Next visit will focus on advancements to more challenging activities and reduction in assistance provided\". Total Treatment Duration: OT Patient Time In/Time Out Time In: 1406 Time Out: 1425 Duy Medrano OT